# Patient Record
Sex: FEMALE | Race: WHITE | NOT HISPANIC OR LATINO | Employment: UNEMPLOYED | URBAN - METROPOLITAN AREA
[De-identification: names, ages, dates, MRNs, and addresses within clinical notes are randomized per-mention and may not be internally consistent; named-entity substitution may affect disease eponyms.]

---

## 2017-09-12 ENCOUNTER — OFFICE VISIT (OUTPATIENT)
Dept: OBSTETRICS AND GYNECOLOGY | Facility: CLINIC | Age: 62
End: 2017-09-12
Payer: COMMERCIAL

## 2017-09-12 VITALS
WEIGHT: 182 LBS | BODY MASS INDEX: 29.25 KG/M2 | DIASTOLIC BLOOD PRESSURE: 76 MMHG | SYSTOLIC BLOOD PRESSURE: 126 MMHG | HEIGHT: 66 IN

## 2017-09-12 DIAGNOSIS — Z01.411 ENCOUNTER FOR GYNECOLOGICAL EXAMINATION (GENERAL) (ROUTINE) WITH ABNORMAL FINDINGS: ICD-10-CM

## 2017-09-12 DIAGNOSIS — Z12.11 SCREEN FOR COLON CANCER: ICD-10-CM

## 2017-09-12 DIAGNOSIS — Z12.31 ENCOUNTER FOR SCREENING MAMMOGRAM FOR BREAST CANCER: ICD-10-CM

## 2017-09-12 DIAGNOSIS — Z12.11 ENCOUNTER FOR SCREENING FECAL OCCULT BLOOD TESTING: Primary | ICD-10-CM

## 2017-09-12 DIAGNOSIS — R19.5 HEME POSITIVE STOOL: ICD-10-CM

## 2017-09-12 DIAGNOSIS — Z12.31 VISIT FOR SCREENING MAMMOGRAM: ICD-10-CM

## 2017-09-12 DIAGNOSIS — Z78.0 MENOPAUSE: ICD-10-CM

## 2017-09-12 LAB
CTP QC/QA: YES
FECAL OCCULT BLOOD, POC: POSITIVE

## 2017-09-12 PROCEDURE — 82270 OCCULT BLOOD FECES: CPT | Mod: S$GLB,,, | Performed by: OBSTETRICS & GYNECOLOGY

## 2017-09-12 PROCEDURE — 99999 PR PBB SHADOW E&M-EST. PATIENT-LVL III: CPT | Mod: PBBFAC,,, | Performed by: OBSTETRICS & GYNECOLOGY

## 2017-09-12 PROCEDURE — 99396 PREV VISIT EST AGE 40-64: CPT | Mod: S$GLB,,, | Performed by: OBSTETRICS & GYNECOLOGY

## 2017-09-12 NOTE — PROGRESS NOTES
Subjective:       Patient ID: Cara Sharp is a 61 y.o. female.    Chief Complaint:  Well Woman (Annual Exam    --  Last Pap/Hpv 8-31-15, Negative  --  Last MMG 16, Normal   --  Last DEXA  8-31-15, Osteopenia Hip)      History of Present Illness.  Cara Sharp is a 61 y.o. female.  She has no breast or urinary symptoms.  She has no postcoital bleeding, pelvic pain or vaginal discharge.  She has some rectal itching and thinks it may be hemorrhoids.    GYN & OB History  Patient's last menstrual period was 2009 (exact date).   Pap: No result found  Mammogram:  Normal  Colonoscopy:  Colitis  DEXA: 8/31/15 FRAX .6% and 9.5%, Spine nl Hip -1.02    OB History    Para Term  AB Living   2 2 2     2   SAB TAB Ectopic Multiple Live Births           2      # Outcome Date GA Lbr Eduardo/2nd Weight Sex Delivery Anes PTL Lv   2 Term 82 40w0d  3.487 kg (7 lb 11 oz) M Vag-Spont EPI  ABY   1 Term 80 40w0d  3.629 kg (8 lb) F Vag-Spont EPI  ABY      Obstetric Comments   Age at menarche 13       Past Medical History:   Diagnosis Date    Hearing impaired     History of bone density study 2015    Hyperlipidemia     Occult blood positive stool     Pap smear for cervical cancer screening 2015    Negative/ Hpv Neg    Thyroid disease     nodules     Past Surgical History:   Procedure Laterality Date    CARPAL TUNNEL RELEASE Left     COLONOSCOPY  10/2015    Diverticulitis (10/25/07 normal)    DILATION AND CURETTAGE OF UTERUS  1985    LAPAROSCOPY FOR ENDOMETRIOSIS    THYROIDECTOMY, PARTIAL  2011    Nodules, Dr Blackwell ([facility])    TONSILLECTOMY       Family History   Problem Relation Age of Onset    Lung cancer Father 80    Hypertension Father     Hyperlipidemia Father     Cancer Father     Thyroid disease Mother     Hypertension Mother     Hyperlipidemia Mother     Ovarian cancer Maternal Aunt 52    Cancer Maternal Aunt     Breast cancer Neg Hx     Colon  "cancer Neg Hx      Social History   Substance Use Topics    Smoking status: Never Smoker    Smokeless tobacco: Never Used    Alcohol use Yes      Comment: Social       Current Outpatient Prescriptions:     CALCIUM CARBONATE/VITAMIN D3 (OS- + D3 ORAL), once a day, Disp: , Rfl:     drospirenone-estradiol (ANGELIQ) 0.5-1 mg per tablet, TAKE 1 TABLET BY MOUTH ONE TIME DAILY, Disp: 84 tablet, Rfl: 3    escitalopram oxalate (LEXAPRO) 20 MG tablet, , Disp: , Rfl:     levothyroxine (SYNTHROID) 88 MCG tablet, , Disp: , Rfl:     simvastatin (ZOCOR) 40 MG tablet, , Disp: , Rfl:     promethazine-phenylephrine 6.25-5 mg/5 mL Syrp, TK 5 ML PO Q 6 H PRF COUGH, Disp: , Rfl: 0    Review of patient's allergies indicates:   Allergen Reactions    Ibuprofen Hives and Shortness Of Breath    Naproxen Hives, Shortness Of Breath and Rash       Review of Systems  Review of Systems   Constitutional: Negative for fatigue.   HENT: Negative for trouble swallowing.    Eyes: Negative for visual disturbance.   Respiratory: Negative for cough and shortness of breath.    Cardiovascular: Negative for chest pain.   Gastrointestinal: Negative for abdominal distention, abdominal pain, blood in stool, nausea and vomiting.   Genitourinary: Negative for difficulty urinating, dyspareunia, dysuria, flank pain, frequency, hematuria, pelvic pain, urgency, vaginal bleeding, vaginal discharge and vaginal pain.   Musculoskeletal: Negative for arthralgias.   Skin: Negative for rash.   Neurological: Negative for dizziness and headaches.   Psychiatric/Behavioral: Negative for sleep disturbance. The patient is not nervous/anxious.         Objective:     Vitals:    09/12/17 1011   BP: 126/76   Weight: 82.6 kg (181 lb 15.8 oz)   Height: 5' 6" (1.676 m)   PainSc: 0-No pain     Body mass index is 29.37 kg/m².    Physical Exam:   Constitutional: She is oriented to person, place, and time. Vital signs are normal. She appears well-developed and " well-nourished.    HENT:   Head: Normocephalic.     Neck: Normal range of motion. No thyromegaly present.     Pulmonary/Chest: Right breast exhibits no mass, no nipple discharge, no skin change, no tenderness and no swelling. Left breast exhibits no mass, no nipple discharge, no skin change, no tenderness and no swelling. Breasts are symmetrical.        Abdominal: Soft. Normal appearance and bowel sounds are normal. She exhibits no distension. There is no tenderness.     Genitourinary: Vagina normal and uterus normal. Rectal exam shows guaiac positive stool. Guaiac positive stool. Pelvic exam was performed with patient supine. There is no rash, tenderness, lesion or injury on the right labia. There is no rash, tenderness, lesion or injury on the left labia. Cervix is normal. Right adnexum displays no mass, no tenderness and no fullness. Left adnexum displays no mass, no tenderness and no fullness. No erythema in the vagina. No vaginal discharge found. Cervix exhibits no motion tenderness and no discharge.           Musculoskeletal: Normal range of motion.      Lymphadenopathy:        Right: No inguinal and no supraclavicular adenopathy present.        Left: No inguinal and no supraclavicular adenopathy present.    Neurological: She is alert and oriented to person, place, and time.    Skin: Skin is warm and dry.    Psychiatric: She has a normal mood and affect.        Assessment/ Plan:     Encounter for screening fecal occult blood testing  -     POCT occult blood stool    Encounter for screening mammogram for breast cancer  -     Mammo Digital Screening Bilat with Tomosynthesis CAD; Future; Expected date: 09/12/2017    Menopause  -     drospirenone-estradiol (ANGELIQ) 0.5-1 mg per tablet; TAKE 1 TABLET BY MOUTH ONE TIME DAILY  Dispense: 84 tablet; Refill: 3    Visit for screening mammogram    Screen for colon cancer  -     POCT occult blood stool    Heme positive stool    Encounter for gynecological examination  (general) (routine) with abnormal findings      Refer to GI for heme pos stool.  Routine pap smears.  Self breast exam and mammography discussed  Routine colonoscopy discussed.  Diet and exercise discussed.  Recommend calcium 1200 mg and vitamin D 600 units daily and routine bone mineral density testing.  Yearly influenza vaccination discussed.  Follow-up with me in 1 year.

## 2017-10-09 DIAGNOSIS — Z78.0 MENOPAUSE: ICD-10-CM

## 2017-10-09 RX ORDER — DROSPIRENONE AND ESTRADIOL 1; .5 MG/1; MG/1
TABLET, FILM COATED ORAL
Qty: 84 TABLET | Refills: 3 | Status: SHIPPED | OUTPATIENT
Start: 2017-10-09 | End: 2019-09-18

## 2017-11-24 ENCOUNTER — TELEPHONE (OUTPATIENT)
Dept: OBSTETRICS AND GYNECOLOGY | Facility: CLINIC | Age: 62
End: 2017-11-24

## 2018-05-07 ENCOUNTER — TELEPHONE (OUTPATIENT)
Dept: OBSTETRICS AND GYNECOLOGY | Facility: CLINIC | Age: 63
End: 2018-05-07

## 2018-05-07 DIAGNOSIS — Z13.820 SCREENING FOR OSTEOPOROSIS: Primary | ICD-10-CM

## 2018-05-24 ENCOUNTER — TELEPHONE (OUTPATIENT)
Dept: OBSTETRICS AND GYNECOLOGY | Facility: CLINIC | Age: 63
End: 2018-05-24

## 2018-05-24 NOTE — TELEPHONE ENCOUNTER
Pt returned my call and she was notified of Normal Dexa results from [facility] and to continue with Calcium + Vit D, per Dr Thomas. Repeat Dexa in 2 yrs

## 2018-05-24 NOTE — TELEPHONE ENCOUNTER
Dr. Thomas patient calling- would like to get her bone density results from [facility]  From May 9,please call to further discuss

## 2018-09-13 ENCOUNTER — OFFICE VISIT (OUTPATIENT)
Dept: OBSTETRICS AND GYNECOLOGY | Facility: CLINIC | Age: 63
End: 2018-09-13
Payer: COMMERCIAL

## 2018-09-13 VITALS
BODY MASS INDEX: 27.99 KG/M2 | WEIGHT: 174.19 LBS | HEIGHT: 66 IN | DIASTOLIC BLOOD PRESSURE: 78 MMHG | SYSTOLIC BLOOD PRESSURE: 122 MMHG

## 2018-09-13 DIAGNOSIS — Z78.0 MENOPAUSE: ICD-10-CM

## 2018-09-13 DIAGNOSIS — Z12.31 ENCOUNTER FOR SCREENING MAMMOGRAM FOR BREAST CANCER: ICD-10-CM

## 2018-09-13 DIAGNOSIS — Z01.419 ENCOUNTER FOR GYNECOLOGICAL EXAMINATION: Primary | ICD-10-CM

## 2018-09-13 PROCEDURE — 99999 PR PBB SHADOW E&M-EST. PATIENT-LVL III: CPT | Mod: PBBFAC,,, | Performed by: OBSTETRICS & GYNECOLOGY

## 2018-09-13 PROCEDURE — 99396 PREV VISIT EST AGE 40-64: CPT | Mod: S$GLB,,, | Performed by: OBSTETRICS & GYNECOLOGY

## 2018-09-13 RX ORDER — VITAMIN E (DL,TOCOPHERYL ACET) 45 MG/0.25
1 DROPS ORAL DAILY
COMMUNITY
End: 2021-11-12

## 2018-09-13 RX ORDER — PROGESTERONE 100 MG/1
CAPSULE ORAL
Qty: 90 CAPSULE | Refills: 3 | Status: SHIPPED | OUTPATIENT
Start: 2018-09-13 | End: 2019-10-11 | Stop reason: SDUPTHER

## 2018-09-13 RX ORDER — ZOSTER VACCINE RECOMBINANT, ADJUVANTED 50 MCG/0.5
KIT INTRAMUSCULAR
Refills: 0 | COMMUNITY
Start: 2018-08-29 | End: 2019-09-18

## 2018-09-13 RX ORDER — ESTRADIOL 1 MG/1
TABLET ORAL
Qty: 90 TABLET | Refills: 3 | Status: SHIPPED | OUTPATIENT
Start: 2018-09-13 | End: 2019-07-30 | Stop reason: SDUPTHER

## 2018-09-13 NOTE — PROGRESS NOTES
Subjective:       Patient ID: Cara Sharp is a 62 y.o. female.    Chief Complaint:  Well Woman (Annual Exam  --  Last Pap/Hpv 8-31-15, Negative  --  Last MMG 17, Birads 2/B  --  Colonosocpy , Colitis  --  Dexa 18, Normal )      History of Present Illness.  Cara Sharp is a 62 y.o. female.  She has no breast or urinary symptoms.  She has no postcoital bleeding, pelvic pain or vaginal discharge.    GYN & OB History  Patient's last menstrual period was 2009 (exact date).   Pap:  Normal HPV negative  Mammogram: 17 Normal  Colonoscopy:  Colitis  DEXA: 18 Normal    OB History    Para Term  AB Living   2 2 2     2   SAB TAB Ectopic Multiple Live Births           2      # Outcome Date GA Lbr Eduardo/2nd Weight Sex Delivery Anes PTL Lv   2 Term 82 40w0d  3.487 kg (7 lb 11 oz) M Vag-Spont EPI  ABY   1 Term 81 40w0d  3.629 kg (8 lb) F Vag-Spont EPI  ABY      Obstetric Comments   Age at menarche 13       Past Medical History:   Diagnosis Date    H/O mammogram 2017    Birads 2/B  @ [facility]     Hearing impaired     History of bone density study 2018    Spine T-score: 0.9   //   Rt Hip T-score:  0.0   @ [facility]     Hyperlipidemia     Occult blood positive stool     Pap smear for cervical cancer screening 2015    Negative/ Hpv Neg    Post-menopause     Thyroid disease     nodules     Past Surgical History:   Procedure Laterality Date    CARPAL TUNNEL RELEASE Left     COLONOSCOPY  10/2015    Diverticulitis (10/25/07 normal)    DILATION AND CURETTAGE OF UTERUS  1985    LAPAROSCOPY FOR ENDOMETRIOSIS    THYROIDECTOMY, PARTIAL  2011    Nodules, Dr Blackwell ([facility])    TONSILLECTOMY       Family History   Problem Relation Age of Onset    Lung cancer Father 80    Hypertension Father     Hyperlipidemia Father     Cancer Father     Thyroid disease Mother     Hypertension Mother     Hyperlipidemia Mother     Ovarian cancer Maternal Aunt  52    Cancer Maternal Aunt     Breast cancer Neg Hx     Colon cancer Neg Hx      Social History     Tobacco Use    Smoking status: Never Smoker    Smokeless tobacco: Never Used   Substance Use Topics    Alcohol use: Yes     Frequency: 2-4 times a month     Drinks per session: 1 or 2     Binge frequency: Never     Comment: Social    Drug use: No       Current Outpatient Medications:     ANGELIQ 0.5-1 mg per tablet, TAKE 1 TABLET BY MOUTH ONE TIME DAILY, Disp: 84 tablet, Rfl: 3    calcium carb and citrate-vitD3 (CITRACAL + D SLOW RELEASE) 600 mg calcium- 500 unit TbSR, Take 1 tablet by mouth once daily., Disp: , Rfl:     escitalopram oxalate (LEXAPRO) 20 MG tablet, , Disp: , Rfl:     levothyroxine (SYNTHROID) 88 MCG tablet, , Disp: , Rfl:     simvastatin (ZOCOR) 40 MG tablet, , Disp: , Rfl:     estradiol (ESTRACE) 1 MG tablet, Take 1 tablet every morning by mouth, Disp: 90 tablet, Rfl: 3    progesterone (PROMETRIUM) 100 MG capsule, Take 1 tablet by mouth 30-60 minutes before bed, Disp: 90 capsule, Rfl: 3    SHINGRIX, PF, 50 mcg/0.5 mL injection, ADM 0.5ML IM UTD, Disp: , Rfl: 0    Review of patient's allergies indicates:   Allergen Reactions    Ibuprofen Hives and Shortness Of Breath    Naproxen Hives, Shortness Of Breath and Rash       Review of Systems  Review of Systems   Constitutional: Negative for fatigue.   HENT: Negative for trouble swallowing.    Eyes: Negative for visual disturbance.   Respiratory: Negative for cough and shortness of breath.    Cardiovascular: Negative for chest pain.   Gastrointestinal: Negative for abdominal distention, abdominal pain, blood in stool, nausea and vomiting.   Genitourinary: Negative for difficulty urinating, dyspareunia, dysuria, flank pain, frequency, hematuria, pelvic pain, urgency, vaginal bleeding, vaginal discharge and vaginal pain.   Musculoskeletal: Negative for arthralgias.   Skin: Negative for rash.   Neurological: Negative for dizziness and  "headaches.   Psychiatric/Behavioral: Negative for sleep disturbance. The patient is not nervous/anxious.         Objective:     Vitals:    09/13/18 1013   BP: 122/78   Weight: 79 kg (174 lb 2.6 oz)   Height: 5' 6" (1.676 m)   PainSc: 0-No pain     Body mass index is 28.11 kg/m².    Physical Exam:   Constitutional: She is oriented to person, place, and time. Vital signs are normal. She appears well-developed and well-nourished.    HENT:   Head: Normocephalic.     Neck: Normal range of motion. No thyromegaly present.     Pulmonary/Chest: Right breast exhibits no mass, no nipple discharge, no skin change, no tenderness and no swelling. Left breast exhibits no mass, no nipple discharge, no skin change, no tenderness and no swelling. Breasts are symmetrical.        Abdominal: Soft. Normal appearance and bowel sounds are normal. She exhibits no distension. There is no tenderness.     Genitourinary: Vagina normal and uterus normal. Pelvic exam was performed with patient supine. There is no rash, tenderness, lesion or injury on the right labia. There is no rash, tenderness, lesion or injury on the left labia. Cervix is normal. Right adnexum displays no mass, no tenderness and no fullness. Left adnexum displays no mass, no tenderness and no fullness. No erythema in the vagina. No vaginal discharge found. Cervix exhibits no motion tenderness and no discharge.           Musculoskeletal: Normal range of motion.      Lymphadenopathy:        Right: No inguinal and no supraclavicular adenopathy present.        Left: No inguinal and no supraclavicular adenopathy present.    Neurological: She is alert and oriented to person, place, and time.    Skin: Skin is warm and dry.    Psychiatric: She has a normal mood and affect.        Assessment/ Plan:     Encounter for gynecological examination    Encounter for screening mammogram for breast cancer  -     Cancel: Mammo Digital Screening Bilat with Tomosynthesis CAD; Future; Expected date: " 09/13/2018    Menopause  -     estradiol (ESTRACE) 1 MG tablet; Take 1 tablet every morning by mouth  Dispense: 90 tablet; Refill: 3  -     progesterone (PROMETRIUM) 100 MG capsule; Take 1 tablet by mouth 30-60 minutes before bed  Dispense: 90 capsule; Refill: 3    Other orders  -     Cancel: drospirenone-estradiol (ANGELIQ) 0.5-1 mg per tablet; TAKE 1 TABLET BY MOUTH ONE TIME DAILY  Dispense: 84 tablet; Refill: 3      She will call me after 3 months to see about increasing HRT doses  Routine pap smears.  Self breast exam and mammography discussed  Routine colonoscopy discussed.  Diet and exercise discussed.  Recommend calcium 1200 mg and vitamin D 1000 units daily and routine bone mineral density testing.  Yearly influenza vaccination discussed.  Follow-up with me in 1 year

## 2018-12-06 ENCOUNTER — TELEPHONE (OUTPATIENT)
Dept: OBSTETRICS AND GYNECOLOGY | Facility: CLINIC | Age: 63
End: 2018-12-06

## 2018-12-06 NOTE — TELEPHONE ENCOUNTER
Informed pt breast ultrasound showed benign cyst in left breast and recommended a 1 year screening mammogram. Pt verbalized understanding.

## 2019-07-30 DIAGNOSIS — Z78.0 MENOPAUSE: ICD-10-CM

## 2019-07-30 RX ORDER — ESTRADIOL 1 MG/1
1 TABLET ORAL DAILY
Qty: 90 TABLET | Refills: 0 | Status: SHIPPED | OUTPATIENT
Start: 2019-07-30 | End: 2019-10-11 | Stop reason: SDUPTHER

## 2019-09-18 ENCOUNTER — OFFICE VISIT (OUTPATIENT)
Dept: OBSTETRICS AND GYNECOLOGY | Facility: CLINIC | Age: 64
End: 2019-09-18
Payer: COMMERCIAL

## 2019-09-18 VITALS
SYSTOLIC BLOOD PRESSURE: 126 MMHG | WEIGHT: 155.19 LBS | DIASTOLIC BLOOD PRESSURE: 74 MMHG | HEIGHT: 66 IN | BODY MASS INDEX: 24.94 KG/M2

## 2019-09-18 DIAGNOSIS — Z12.31 VISIT FOR SCREENING MAMMOGRAM: ICD-10-CM

## 2019-09-18 DIAGNOSIS — Z12.4 SCREENING FOR CERVICAL CANCER: ICD-10-CM

## 2019-09-18 DIAGNOSIS — Z01.419 ENCOUNTER FOR GYNECOLOGICAL EXAMINATION: Primary | ICD-10-CM

## 2019-09-18 DIAGNOSIS — Z11.51 SPECIAL SCREENING EXAMINATION FOR HUMAN PAPILLOMAVIRUS (HPV): ICD-10-CM

## 2019-09-18 PROCEDURE — 88175 CYTOPATH C/V AUTO FLUID REDO: CPT

## 2019-09-18 PROCEDURE — 99396 PR PREVENTIVE VISIT,EST,40-64: ICD-10-PCS | Mod: S$GLB,,, | Performed by: OBSTETRICS & GYNECOLOGY

## 2019-09-18 PROCEDURE — 99999 PR PBB SHADOW E&M-EST. PATIENT-LVL III: ICD-10-PCS | Mod: PBBFAC,,, | Performed by: OBSTETRICS & GYNECOLOGY

## 2019-09-18 PROCEDURE — 87624 HPV HI-RISK TYP POOLED RSLT: CPT

## 2019-09-18 PROCEDURE — 99396 PREV VISIT EST AGE 40-64: CPT | Mod: S$GLB,,, | Performed by: OBSTETRICS & GYNECOLOGY

## 2019-09-18 PROCEDURE — 99999 PR PBB SHADOW E&M-EST. PATIENT-LVL III: CPT | Mod: PBBFAC,,, | Performed by: OBSTETRICS & GYNECOLOGY

## 2019-09-18 NOTE — PROGRESS NOTES
Subjective:       Patient ID: Cara Sharp is a 63 y.o. female.    Chief Complaint:  Well Woman      History of Present Illness.  Cara Sharp is a 63 y.o. female.  She has no breast or urinary symptoms.  She has no postcoital bleeding, pelvic pain or vaginal discharge.    GYN & OB History  Patient's last menstrual period was 2009 (exact date).   Pap: No result found  Mammogram: 18 left breast nodule 12 o'clock, U/S: benign simple cyst  Colonoscopy:  Colitis  DEXA: 18 Normal     OB History    Para Term  AB Living   2 2 2     2   SAB TAB Ectopic Multiple Live Births           2      # Outcome Date GA Lbr Eduardo/2nd Weight Sex Delivery Anes PTL Lv   2 Term 82 40w0d  3.487 kg (7 lb 11 oz) M Vag-Spont EPI  ABY   1 Term 80 40w0d  3.629 kg (8 lb) F Vag-Spont EPI  ABY      Obstetric Comments   Age at menarche 13       Past Medical History:   Diagnosis Date    H/O mammogram 2017    Birads 2/B  @ [facility]     Hearing impaired     History of bone density study 2018    Spine T-score: 0.9   //   Rt Hip T-score:  0.0   @ [facility]     Hyperlipidemia     Occult blood positive stool     Pap smear for cervical cancer screening 2015    Negative/ Hpv Neg    Post-menopause     Thyroid disease     nodules     Past Surgical History:   Procedure Laterality Date    CARPAL TUNNEL RELEASE Left     COLONOSCOPY  10/2015    Diverticulitis (10/25/07 normal)    DILATION AND CURETTAGE OF UTERUS  1985    LAPAROSCOPY FOR ENDOMETRIOSIS    THYROIDECTOMY, PARTIAL  2011    Nodules, Dr Blackwell ([facility])    TONSILLECTOMY       Family History   Problem Relation Age of Onset    Lung cancer Father 80    Hypertension Father     Hyperlipidemia Father     Cancer Father     Thyroid disease Mother     Hypertension Mother     Hyperlipidemia Mother     Ovarian cancer Maternal Aunt 52    Cancer Maternal Aunt     Breast cancer Neg Hx     Colon cancer Neg Hx      Social History      Tobacco Use    Smoking status: Never Smoker    Smokeless tobacco: Never Used   Substance Use Topics    Alcohol use: Yes     Frequency: 2-4 times a month     Drinks per session: 1 or 2     Binge frequency: Never     Comment: Social    Drug use: No       Current Outpatient Medications:     calcium carb and citrate-vitD3 (CITRACAL + D SLOW RELEASE) 600 mg calcium- 500 unit TbSR, Take 1 tablet by mouth once daily., Disp: , Rfl:     ergocalciferol, vitamin D2, (VITAMIN D ORAL), Take by mouth., Disp: , Rfl:     escitalopram oxalate (LEXAPRO) 20 MG tablet, , Disp: , Rfl:     estradiol (ESTRACE) 1 MG tablet, Take 1 tablet (1 mg total) by mouth once daily. You are due for an annual exam.  Please call to schedule an appointment.  Thanks!, Disp: 90 tablet, Rfl: 0    levothyroxine (SYNTHROID) 88 MCG tablet, , Disp: , Rfl:     progesterone (PROMETRIUM) 100 MG capsule, Take 1 tablet by mouth 30-60 minutes before bed, Disp: 90 capsule, Rfl: 3    simvastatin (ZOCOR) 40 MG tablet, , Disp: , Rfl:     Review of patient's allergies indicates:   Allergen Reactions    Ibuprofen Hives and Shortness Of Breath    Naproxen Hives, Shortness Of Breath and Rash       Review of Systems  Review of Systems   Constitutional: Negative for fatigue.   HENT: Negative for trouble swallowing.    Eyes: Negative for visual disturbance.   Respiratory: Negative for cough and shortness of breath.    Cardiovascular: Negative for chest pain.   Gastrointestinal: Negative for abdominal distention, abdominal pain, blood in stool, nausea and vomiting.   Genitourinary: Negative for difficulty urinating, dyspareunia, dysuria, flank pain, frequency, hematuria, pelvic pain, urgency, vaginal bleeding, vaginal discharge and vaginal pain.   Musculoskeletal: Negative for arthralgias.   Skin: Negative for rash.   Neurological: Negative for dizziness and headaches.   Psychiatric/Behavioral: Negative for sleep disturbance. The patient is not nervous/anxious.   "       Objective:     Vitals:    09/18/19 0957   BP: 126/74   Weight: 70.4 kg (155 lb 3.3 oz)   Height: 5' 6" (1.676 m)   PainSc: 0-No pain     Body mass index is 25.05 kg/m².    Physical Exam:   Constitutional: She is oriented to person, place, and time. Vital signs are normal. She appears well-developed and well-nourished.    HENT:   Head: Normocephalic.     Neck: Normal range of motion. No thyromegaly present.     Pulmonary/Chest: Right breast exhibits no mass, no nipple discharge, no skin change, no tenderness and no swelling. Left breast exhibits no mass, no nipple discharge, no skin change, no tenderness and no swelling. Breasts are symmetrical.        Abdominal: Soft. Normal appearance and bowel sounds are normal. She exhibits no distension. There is no tenderness.     Genitourinary: Vagina normal and uterus normal. Pelvic exam was performed with patient supine. There is no rash, tenderness, lesion or injury on the right labia. There is no rash, tenderness, lesion or injury on the left labia. Cervix is normal. Right adnexum displays no mass, no tenderness and no fullness. Left adnexum displays no mass, no tenderness and no fullness. No erythema in the vagina. No vaginal discharge found. Cervix exhibits no motion tenderness and no discharge.           Musculoskeletal: Normal range of motion.      Lymphadenopathy:        Right: No inguinal and no supraclavicular adenopathy present.        Left: No inguinal and no supraclavicular adenopathy present.    Neurological: She is alert and oriented to person, place, and time.    Skin: Skin is warm and dry.    Psychiatric: She has a normal mood and affect.        Assessment/ Plan:     Encounter for gynecological examination    Screening for cervical cancer  -     Liquid-based pap smear, screening    Special screening examination for human papillomavirus (HPV)  -     HPV High Risk Genotypes, PCR    Visit for screening mammogram  -     Mammo Digital Screening Bilat w/ " Arnulfo; Future; Expected date: 09/18/2019        Routine pap smears.  Self breast exam and mammography discussed  Routine colonoscopy discussed.  Diet and exercise discussed.  Recommend calcium 1200 mg and vitamin D 1000 units daily and routine bone mineral density testing.  Yearly influenza vaccination discussed.  Follow-up with me in 1 year

## 2019-09-25 ENCOUNTER — TELEPHONE (OUTPATIENT)
Dept: OBSTETRICS AND GYNECOLOGY | Facility: CLINIC | Age: 64
End: 2019-09-25

## 2019-09-25 LAB
HPV HR 12 DNA CVX QL NAA+PROBE: NEGATIVE
HPV16 AG SPEC QL: NEGATIVE
HPV18 DNA SPEC QL NAA+PROBE: NEGATIVE

## 2019-09-25 NOTE — TELEPHONE ENCOUNTER
----- Message from Sheila Thomas MD sent at 9/25/2019 12:03 PM CDT -----  Call patient and tell her that her Pap smear is normal and I will see her in 1 year for her annual exam.

## 2019-10-10 DIAGNOSIS — Z78.0 MENOPAUSE: ICD-10-CM

## 2019-10-11 RX ORDER — ESTRADIOL 1 MG/1
TABLET ORAL
Qty: 90 TABLET | Refills: 3 | Status: SHIPPED | OUTPATIENT
Start: 2019-10-11 | End: 2020-09-24 | Stop reason: SDUPTHER

## 2019-10-11 RX ORDER — PROGESTERONE 100 MG/1
CAPSULE ORAL
Qty: 90 CAPSULE | Refills: 3 | Status: SHIPPED | OUTPATIENT
Start: 2019-10-11 | End: 2020-09-24 | Stop reason: SDUPTHER

## 2019-12-05 ENCOUNTER — TELEPHONE (OUTPATIENT)
Dept: OBSTETRICS AND GYNECOLOGY | Facility: CLINIC | Age: 64
End: 2019-12-05

## 2020-09-24 ENCOUNTER — OFFICE VISIT (OUTPATIENT)
Dept: OBSTETRICS AND GYNECOLOGY | Facility: CLINIC | Age: 65
End: 2020-09-24
Attending: OBSTETRICS & GYNECOLOGY
Payer: COMMERCIAL

## 2020-09-24 VITALS
DIASTOLIC BLOOD PRESSURE: 80 MMHG | HEIGHT: 66 IN | WEIGHT: 174.19 LBS | BODY MASS INDEX: 27.99 KG/M2 | SYSTOLIC BLOOD PRESSURE: 126 MMHG

## 2020-09-24 DIAGNOSIS — Z78.0 MENOPAUSE: ICD-10-CM

## 2020-09-24 DIAGNOSIS — Z12.31 BREAST CANCER SCREENING BY MAMMOGRAM: Primary | ICD-10-CM

## 2020-09-24 DIAGNOSIS — Z01.419 ENCOUNTER FOR GYNECOLOGICAL EXAMINATION: ICD-10-CM

## 2020-09-24 PROCEDURE — 99999 PR PBB SHADOW E&M-EST. PATIENT-LVL III: ICD-10-PCS | Mod: PBBFAC,,, | Performed by: OBSTETRICS & GYNECOLOGY

## 2020-09-24 PROCEDURE — 99396 PR PREVENTIVE VISIT,EST,40-64: ICD-10-PCS | Mod: S$GLB,,, | Performed by: OBSTETRICS & GYNECOLOGY

## 2020-09-24 PROCEDURE — 99396 PREV VISIT EST AGE 40-64: CPT | Mod: S$GLB,,, | Performed by: OBSTETRICS & GYNECOLOGY

## 2020-09-24 PROCEDURE — 99999 PR PBB SHADOW E&M-EST. PATIENT-LVL III: CPT | Mod: PBBFAC,,, | Performed by: OBSTETRICS & GYNECOLOGY

## 2020-09-24 PROCEDURE — 3008F PR BODY MASS INDEX (BMI) DOCUMENTED: ICD-10-PCS | Mod: CPTII,S$GLB,, | Performed by: OBSTETRICS & GYNECOLOGY

## 2020-09-24 PROCEDURE — 3008F BODY MASS INDEX DOCD: CPT | Mod: CPTII,S$GLB,, | Performed by: OBSTETRICS & GYNECOLOGY

## 2020-09-24 RX ORDER — PROGESTERONE 100 MG/1
CAPSULE ORAL
Qty: 90 CAPSULE | Refills: 3 | Status: SHIPPED | OUTPATIENT
Start: 2020-09-24 | End: 2020-09-24

## 2020-09-24 RX ORDER — ESTRADIOL 1 MG/1
TABLET ORAL
Qty: 90 TABLET | Refills: 3 | Status: SHIPPED | OUTPATIENT
Start: 2020-09-24 | End: 2020-09-24

## 2020-09-24 RX ORDER — ESTRADIOL 2 MG/1
2 TABLET ORAL DAILY
Qty: 30 TABLET | Refills: 11 | Status: SHIPPED | OUTPATIENT
Start: 2020-09-24 | End: 2021-08-17

## 2020-09-24 RX ORDER — PROGESTERONE 200 MG/1
200 CAPSULE ORAL DAILY
Qty: 30 CAPSULE | Refills: 11 | Status: SHIPPED | OUTPATIENT
Start: 2020-09-24 | End: 2021-11-12

## 2020-09-24 RX ORDER — SODIUM, POTASSIUM,MAG SULFATES 17.5-3.13G
SOLUTION, RECONSTITUTED, ORAL ORAL
COMMUNITY
Start: 2020-09-22 | End: 2021-08-17

## 2020-09-24 RX ORDER — ERGOCALCIFEROL 1.25 MG/1
CAPSULE ORAL
COMMUNITY
Start: 2020-06-30 | End: 2021-11-12

## 2020-10-13 ENCOUNTER — TELEPHONE (OUTPATIENT)
Dept: OBSTETRICS AND GYNECOLOGY | Facility: CLINIC | Age: 65
End: 2020-10-13

## 2020-10-20 NOTE — TELEPHONE ENCOUNTER
"I called [facility] and spoke with "Shelby". Informed her that pt is on the Estradiol 2 mg and Progesterone 200 mg and also informed her that we didn't send Levothyroxine, because we do Not give her that medicine.     We do not have any Thyroid Labs on file   "

## 2021-04-26 ENCOUNTER — PATIENT MESSAGE (OUTPATIENT)
Dept: RESEARCH | Facility: HOSPITAL | Age: 66
End: 2021-04-26

## 2021-04-27 ENCOUNTER — TELEPHONE (OUTPATIENT)
Dept: OBSTETRICS AND GYNECOLOGY | Facility: CLINIC | Age: 66
End: 2021-04-27

## 2021-08-03 ENCOUNTER — TELEPHONE (OUTPATIENT)
Dept: OBSTETRICS AND GYNECOLOGY | Facility: CLINIC | Age: 66
End: 2021-08-03

## 2021-08-03 DIAGNOSIS — N95.0 PMB (POSTMENOPAUSAL BLEEDING): Primary | ICD-10-CM

## 2021-08-17 ENCOUNTER — HOSPITAL ENCOUNTER (OUTPATIENT)
Dept: RADIOLOGY | Facility: OTHER | Age: 66
Discharge: HOME | End: 2021-08-17
Attending: OBSTETRICS & GYNECOLOGY
Payer: COMMERCIAL

## 2021-08-17 ENCOUNTER — OFFICE VISIT (OUTPATIENT)
Dept: OBSTETRICS AND GYNECOLOGY | Facility: CLINIC | Age: 66
End: 2021-08-17
Attending: OBSTETRICS & GYNECOLOGY
Payer: COMMERCIAL

## 2021-08-17 VITALS
SYSTOLIC BLOOD PRESSURE: 122 MMHG | DIASTOLIC BLOOD PRESSURE: 80 MMHG | WEIGHT: 178.56 LBS | BODY MASS INDEX: 28.7 KG/M2 | HEIGHT: 66 IN

## 2021-08-17 DIAGNOSIS — N95.0 PMB (POSTMENOPAUSAL BLEEDING): ICD-10-CM

## 2021-08-17 DIAGNOSIS — N95.0 PMB (POSTMENOPAUSAL BLEEDING): Primary | ICD-10-CM

## 2021-08-17 DIAGNOSIS — Z78.0 MENOPAUSE: ICD-10-CM

## 2021-08-17 DIAGNOSIS — N63.25 BREAST LUMP ON LEFT SIDE AT 3 O'CLOCK POSITION: ICD-10-CM

## 2021-08-17 DIAGNOSIS — R93.89 THICKENED ENDOMETRIUM: ICD-10-CM

## 2021-08-17 PROCEDURE — 76856 US PELVIS COMP WITH TRANSVAG NON-OB (XPD): ICD-10-PCS | Mod: 26,,, | Performed by: RADIOLOGY

## 2021-08-17 PROCEDURE — 99999 PR PBB SHADOW E&M-EST. PATIENT-LVL III: ICD-10-PCS | Mod: PBBFAC,,, | Performed by: OBSTETRICS & GYNECOLOGY

## 2021-08-17 PROCEDURE — 1159F MED LIST DOCD IN RCRD: CPT | Mod: CPTII,S$GLB,, | Performed by: OBSTETRICS & GYNECOLOGY

## 2021-08-17 PROCEDURE — 3008F PR BODY MASS INDEX (BMI) DOCUMENTED: ICD-10-PCS | Mod: CPTII,S$GLB,, | Performed by: OBSTETRICS & GYNECOLOGY

## 2021-08-17 PROCEDURE — 76830 US PELVIS COMP WITH TRANSVAG NON-OB (XPD): ICD-10-PCS | Mod: 26,,, | Performed by: RADIOLOGY

## 2021-08-17 PROCEDURE — 1126F AMNT PAIN NOTED NONE PRSNT: CPT | Mod: CPTII,S$GLB,, | Performed by: OBSTETRICS & GYNECOLOGY

## 2021-08-17 PROCEDURE — 3079F DIAST BP 80-89 MM HG: CPT | Mod: CPTII,S$GLB,, | Performed by: OBSTETRICS & GYNECOLOGY

## 2021-08-17 PROCEDURE — 3074F PR MOST RECENT SYSTOLIC BLOOD PRESSURE < 130 MM HG: ICD-10-PCS | Mod: CPTII,S$GLB,, | Performed by: OBSTETRICS & GYNECOLOGY

## 2021-08-17 PROCEDURE — 88305 TISSUE EXAM BY PATHOLOGIST: CPT | Performed by: PATHOLOGY

## 2021-08-17 PROCEDURE — 88305 TISSUE EXAM BY PATHOLOGIST: CPT | Mod: 26,,, | Performed by: PATHOLOGY

## 2021-08-17 PROCEDURE — 99999 PR PBB SHADOW E&M-EST. PATIENT-LVL III: CPT | Mod: PBBFAC,,, | Performed by: OBSTETRICS & GYNECOLOGY

## 2021-08-17 PROCEDURE — 1159F PR MEDICATION LIST DOCUMENTED IN MEDICAL RECORD: ICD-10-PCS | Mod: CPTII,S$GLB,, | Performed by: OBSTETRICS & GYNECOLOGY

## 2021-08-17 PROCEDURE — 3079F PR MOST RECENT DIASTOLIC BLOOD PRESSURE 80-89 MM HG: ICD-10-PCS | Mod: CPTII,S$GLB,, | Performed by: OBSTETRICS & GYNECOLOGY

## 2021-08-17 PROCEDURE — 88305 TISSUE EXAM BY PATHOLOGIST: ICD-10-PCS | Mod: 26,,, | Performed by: PATHOLOGY

## 2021-08-17 PROCEDURE — 99214 OFFICE O/P EST MOD 30 MIN: CPT | Mod: S$GLB,,, | Performed by: OBSTETRICS & GYNECOLOGY

## 2021-08-17 PROCEDURE — 3008F BODY MASS INDEX DOCD: CPT | Mod: CPTII,S$GLB,, | Performed by: OBSTETRICS & GYNECOLOGY

## 2021-08-17 PROCEDURE — 3074F SYST BP LT 130 MM HG: CPT | Mod: CPTII,S$GLB,, | Performed by: OBSTETRICS & GYNECOLOGY

## 2021-08-17 PROCEDURE — 76830 TRANSVAGINAL US NON-OB: CPT | Mod: 26,,, | Performed by: RADIOLOGY

## 2021-08-17 PROCEDURE — 76856 US EXAM PELVIC COMPLETE: CPT | Mod: 26,,, | Performed by: RADIOLOGY

## 2021-08-17 PROCEDURE — 99214 PR OFFICE/OUTPT VISIT, EST, LEVL IV, 30-39 MIN: ICD-10-PCS | Mod: S$GLB,,, | Performed by: OBSTETRICS & GYNECOLOGY

## 2021-08-17 PROCEDURE — 1126F PR PAIN SEVERITY QUANTIFIED, NO PAIN PRESENT: ICD-10-PCS | Mod: CPTII,S$GLB,, | Performed by: OBSTETRICS & GYNECOLOGY

## 2021-08-17 PROCEDURE — 76856 US EXAM PELVIC COMPLETE: CPT | Mod: TC

## 2021-08-17 RX ORDER — ESTRADIOL 1 MG/1
1.5 TABLET ORAL DAILY
Qty: 45 TABLET | Refills: 11 | Status: SHIPPED | OUTPATIENT
Start: 2021-08-17 | End: 2021-11-12

## 2021-08-20 LAB
FINAL PATHOLOGIC DIAGNOSIS: NORMAL
GROSS: NORMAL
Lab: NORMAL

## 2021-09-28 ENCOUNTER — TELEPHONE (OUTPATIENT)
Dept: OBSTETRICS AND GYNECOLOGY | Facility: CLINIC | Age: 66
End: 2021-09-28

## 2021-10-15 ENCOUNTER — TELEPHONE (OUTPATIENT)
Dept: OBSTETRICS AND GYNECOLOGY | Facility: CLINIC | Age: 66
End: 2021-10-15

## 2021-10-15 DIAGNOSIS — N63.20 LEFT BREAST LUMP: Primary | ICD-10-CM

## 2021-10-18 ENCOUNTER — TELEPHONE (OUTPATIENT)
Dept: OBSTETRICS AND GYNECOLOGY | Facility: CLINIC | Age: 66
End: 2021-10-18

## 2021-10-19 ENCOUNTER — TELEPHONE (OUTPATIENT)
Dept: OBSTETRICS AND GYNECOLOGY | Facility: CLINIC | Age: 66
End: 2021-10-19

## 2021-10-20 ENCOUNTER — TELEPHONE (OUTPATIENT)
Dept: OBSTETRICS AND GYNECOLOGY | Facility: CLINIC | Age: 66
End: 2021-10-20

## 2021-10-20 DIAGNOSIS — N63.20 LEFT BREAST LUMP: Primary | ICD-10-CM

## 2021-10-27 ENCOUNTER — TELEPHONE (OUTPATIENT)
Dept: OBSTETRICS AND GYNECOLOGY | Facility: CLINIC | Age: 66
End: 2021-10-27
Payer: MEDICARE

## 2021-11-02 ENCOUNTER — TELEPHONE (OUTPATIENT)
Dept: OBSTETRICS AND GYNECOLOGY | Facility: CLINIC | Age: 66
End: 2021-11-02
Payer: MEDICARE

## 2021-11-02 DIAGNOSIS — D49.3 BREAST NEOPLASM: Primary | ICD-10-CM

## 2021-11-04 ENCOUNTER — TELEPHONE (OUTPATIENT)
Dept: SURGERY | Facility: CLINIC | Age: 66
End: 2021-11-04
Payer: MEDICARE

## 2021-11-10 ENCOUNTER — LAB VISIT (OUTPATIENT)
Dept: LAB | Facility: HOSPITAL | Age: 66
End: 2021-11-10
Attending: SURGERY
Payer: MEDICARE

## 2021-11-10 DIAGNOSIS — C50.919 BREAST CANCER IN FEMALE: ICD-10-CM

## 2021-11-10 DIAGNOSIS — C50.919 BREAST CANCER IN FEMALE: Primary | ICD-10-CM

## 2021-11-10 LAB
COMMENT: NORMAL
FINAL PATHOLOGIC DIAGNOSIS: NORMAL
Lab: NORMAL

## 2021-11-10 PROCEDURE — 88321 CONSLTJ&REPRT SLD PREP ELSWR: CPT | Performed by: PATHOLOGY

## 2021-11-10 PROCEDURE — 88321 PR  MICROSLIDE CONSULT: ICD-10-PCS | Mod: ,,, | Performed by: PATHOLOGY

## 2021-11-10 PROCEDURE — 88321 CONSLTJ&REPRT SLD PREP ELSWR: CPT | Mod: ,,, | Performed by: PATHOLOGY

## 2021-11-11 ENCOUNTER — HOSPITAL ENCOUNTER (OUTPATIENT)
Dept: RADIOLOGY | Facility: HOSPITAL | Age: 66
Discharge: HOME | End: 2021-11-11
Attending: SURGERY
Payer: MEDICARE

## 2021-11-11 DIAGNOSIS — C50.912 INVASIVE DUCTAL CARCINOMA OF BREAST, LEFT: Primary | ICD-10-CM

## 2021-11-11 PROCEDURE — 76642 ULTRASOUND BREAST LIMITED: CPT | Mod: 26,LT,, | Performed by: RADIOLOGY

## 2021-11-11 PROCEDURE — 77066 PR MAMMO, CAD, DIAGNOSTIC, BILAT: ICD-10-PCS | Mod: 26,,, | Performed by: RADIOLOGY

## 2021-11-11 PROCEDURE — 76642 PR US BREAST UNILAT LIMITED: ICD-10-PCS | Mod: 26,LT,, | Performed by: RADIOLOGY

## 2021-11-11 PROCEDURE — 77066 DX MAMMO INCL CAD BI: CPT | Mod: 26,,, | Performed by: RADIOLOGY

## 2021-11-12 ENCOUNTER — OFFICE VISIT (OUTPATIENT)
Dept: SURGERY | Facility: CLINIC | Age: 66
End: 2021-11-12
Payer: MEDICARE

## 2021-11-12 ENCOUNTER — HOSPITAL ENCOUNTER (OUTPATIENT)
Dept: CARDIOLOGY | Facility: CLINIC | Age: 66
Discharge: HOME | End: 2021-11-12
Payer: MEDICARE

## 2021-11-12 ENCOUNTER — HOSPITAL ENCOUNTER (OUTPATIENT)
Dept: RADIOLOGY | Facility: HOSPITAL | Age: 66
Discharge: HOME | End: 2021-11-12
Attending: SURGERY
Payer: MEDICARE

## 2021-11-12 ENCOUNTER — DOCUMENTATION ONLY (OUTPATIENT)
Dept: SURGERY | Facility: CLINIC | Age: 66
End: 2021-11-12

## 2021-11-12 ENCOUNTER — DOCUMENTATION ONLY (OUTPATIENT)
Dept: SURGERY | Facility: CLINIC | Age: 66
End: 2021-11-12
Payer: MEDICARE

## 2021-11-12 VITALS
HEIGHT: 66 IN | SYSTOLIC BLOOD PRESSURE: 162 MMHG | BODY MASS INDEX: 28.93 KG/M2 | DIASTOLIC BLOOD PRESSURE: 77 MMHG | WEIGHT: 180 LBS | HEART RATE: 71 BPM

## 2021-11-12 DIAGNOSIS — N63.10 BREAST MASS, RIGHT: ICD-10-CM

## 2021-11-12 DIAGNOSIS — C50.412 MALIGNANT NEOPLASM OF UPPER-OUTER QUADRANT OF LEFT BREAST IN FEMALE, ESTROGEN RECEPTOR POSITIVE: ICD-10-CM

## 2021-11-12 DIAGNOSIS — Z01.818 PRE-OP TESTING: ICD-10-CM

## 2021-11-12 DIAGNOSIS — Z17.0 MALIGNANT NEOPLASM OF UPPER-OUTER QUADRANT OF LEFT BREAST IN FEMALE, ESTROGEN RECEPTOR POSITIVE: ICD-10-CM

## 2021-11-12 DIAGNOSIS — D49.3 BREAST NEOPLASM: ICD-10-CM

## 2021-11-12 DIAGNOSIS — Z01.818 PRE-OP TESTING: Primary | ICD-10-CM

## 2021-11-12 PROCEDURE — 93005 EKG 12-LEAD: ICD-10-PCS | Mod: S$GLB,,, | Performed by: SURGERY

## 2021-11-12 PROCEDURE — 3078F PR MOST RECENT DIASTOLIC BLOOD PRESSURE < 80 MM HG: ICD-10-PCS | Mod: CPTII,S$GLB,, | Performed by: SURGERY

## 2021-11-12 PROCEDURE — 93005 ELECTROCARDIOGRAM TRACING: CPT | Mod: S$GLB,,, | Performed by: SURGERY

## 2021-11-12 PROCEDURE — 3077F SYST BP >= 140 MM HG: CPT | Mod: CPTII,S$GLB,, | Performed by: SURGERY

## 2021-11-12 PROCEDURE — 93010 ELECTROCARDIOGRAM REPORT: CPT | Mod: S$GLB,,, | Performed by: INTERNAL MEDICINE

## 2021-11-12 PROCEDURE — 1159F PR MEDICATION LIST DOCUMENTED IN MEDICAL RECORD: ICD-10-PCS | Mod: CPTII,S$GLB,, | Performed by: SURGERY

## 2021-11-12 PROCEDURE — 1159F MED LIST DOCD IN RCRD: CPT | Mod: CPTII,S$GLB,, | Performed by: SURGERY

## 2021-11-12 PROCEDURE — 3008F BODY MASS INDEX DOCD: CPT | Mod: CPTII,S$GLB,, | Performed by: SURGERY

## 2021-11-12 PROCEDURE — 99205 PR OFFICE/OUTPT VISIT, NEW, LEVL V, 60-74 MIN: ICD-10-PCS | Mod: S$GLB,,, | Performed by: SURGERY

## 2021-11-12 PROCEDURE — 76642 ULTRASOUND BREAST LIMITED: CPT | Mod: TC,RT

## 2021-11-12 PROCEDURE — 99205 OFFICE O/P NEW HI 60 MIN: CPT | Mod: S$GLB,,, | Performed by: SURGERY

## 2021-11-12 PROCEDURE — 1160F RVW MEDS BY RX/DR IN RCRD: CPT | Mod: CPTII,S$GLB,, | Performed by: SURGERY

## 2021-11-12 PROCEDURE — 93010 EKG 12-LEAD: ICD-10-PCS | Mod: S$GLB,,, | Performed by: INTERNAL MEDICINE

## 2021-11-12 PROCEDURE — 3008F PR BODY MASS INDEX (BMI) DOCUMENTED: ICD-10-PCS | Mod: CPTII,S$GLB,, | Performed by: SURGERY

## 2021-11-12 PROCEDURE — 76642 ULTRASOUND BREAST LIMITED: CPT | Mod: 26,RT,, | Performed by: RADIOLOGY

## 2021-11-12 PROCEDURE — 76642 US BREAST RIGHT LIMITED: ICD-10-PCS | Mod: 26,RT,, | Performed by: RADIOLOGY

## 2021-11-12 PROCEDURE — 99999 PR PBB SHADOW E&M-EST. PATIENT-LVL III: ICD-10-PCS | Mod: PBBFAC,,, | Performed by: SURGERY

## 2021-11-12 PROCEDURE — 3077F PR MOST RECENT SYSTOLIC BLOOD PRESSURE >= 140 MM HG: ICD-10-PCS | Mod: CPTII,S$GLB,, | Performed by: SURGERY

## 2021-11-12 PROCEDURE — 1160F PR REVIEW ALL MEDS BY PRESCRIBER/CLIN PHARMACIST DOCUMENTED: ICD-10-PCS | Mod: CPTII,S$GLB,, | Performed by: SURGERY

## 2021-11-12 PROCEDURE — 99999 PR PBB SHADOW E&M-EST. PATIENT-LVL III: CPT | Mod: PBBFAC,,, | Performed by: SURGERY

## 2021-11-12 PROCEDURE — 3078F DIAST BP <80 MM HG: CPT | Mod: CPTII,S$GLB,, | Performed by: SURGERY

## 2021-11-26 ENCOUNTER — ANESTHESIA EVENT (OUTPATIENT)
Dept: SURGERY | Facility: HOSPITAL | Age: 66
End: 2021-11-26
Payer: MEDICARE

## 2021-11-26 ENCOUNTER — TELEPHONE (OUTPATIENT)
Dept: SURGERY | Facility: CLINIC | Age: 66
End: 2021-11-26
Payer: MEDICARE

## 2021-11-26 ENCOUNTER — PATIENT MESSAGE (OUTPATIENT)
Dept: SURGERY | Facility: CLINIC | Age: 66
End: 2021-11-26
Payer: MEDICARE

## 2021-12-01 ENCOUNTER — HOSPITAL ENCOUNTER (OUTPATIENT)
Dept: RADIOLOGY | Facility: HOSPITAL | Age: 66
Discharge: HOME | End: 2021-12-01
Attending: SURGERY
Payer: MEDICARE

## 2021-12-01 DIAGNOSIS — R92.8 ABNORMAL FINDING ON BREAST IMAGING: ICD-10-CM

## 2021-12-01 DIAGNOSIS — C50.912 INVASIVE DUCTAL CARCINOMA OF BREAST, LEFT: ICD-10-CM

## 2021-12-01 PROCEDURE — A4648 IMPLANTABLE TISSUE MARKER: HCPCS

## 2021-12-01 PROCEDURE — 25000003 PHARM REV CODE 250: Performed by: SURGERY

## 2021-12-01 PROCEDURE — 19285 US BREAST RADAR REFLECTOR LOCALIZATION W/GUIDANCE, 1ST LESION, LEFT: ICD-10-PCS | Mod: LT,,, | Performed by: RADIOLOGY

## 2021-12-01 PROCEDURE — 77065 DX MAMMO INCL CAD UNI: CPT | Mod: TC,LT

## 2021-12-01 PROCEDURE — 19285 PERQ DEV BREAST 1ST US IMAG: CPT | Mod: LT,,, | Performed by: RADIOLOGY

## 2021-12-01 PROCEDURE — 77065 DX MAMMO INCL CAD UNI: CPT | Mod: 26,LT,, | Performed by: RADIOLOGY

## 2021-12-01 PROCEDURE — 77065 MAMMO DIGITAL DIAGNOSTIC LEFT: ICD-10-PCS | Mod: 26,LT,, | Performed by: RADIOLOGY

## 2021-12-01 RX ORDER — LIDOCAINE HYDROCHLORIDE 20 MG/ML
10 INJECTION, SOLUTION INFILTRATION; PERINEURAL ONCE
Status: COMPLETED | OUTPATIENT
Start: 2021-12-01 | End: 2021-12-01

## 2021-12-01 RX ADMIN — LIDOCAINE HYDROCHLORIDE 10 ML: 20 INJECTION, SOLUTION INFILTRATION; PERINEURAL at 11:12

## 2021-12-02 ENCOUNTER — TELEPHONE (OUTPATIENT)
Dept: SURGERY | Facility: CLINIC | Age: 66
End: 2021-12-02
Payer: MEDICARE

## 2021-12-02 RX ORDER — HYDROCODONE BITARTRATE AND ACETAMINOPHEN 5; 325 MG/1; MG/1
1 TABLET ORAL EVERY 6 HOURS PRN
Qty: 10 TABLET | Refills: 0 | Status: SHIPPED | OUTPATIENT
Start: 2021-12-02 | End: 2022-03-10

## 2021-12-03 ENCOUNTER — HOSPITAL ENCOUNTER (OUTPATIENT)
Facility: HOSPITAL | Age: 66
Discharge: HOME | End: 2021-12-03
Attending: SURGERY | Admitting: SURGERY
Payer: MEDICARE

## 2021-12-03 ENCOUNTER — ANESTHESIA (OUTPATIENT)
Dept: SURGERY | Facility: HOSPITAL | Age: 66
End: 2021-12-03
Payer: MEDICARE

## 2021-12-03 ENCOUNTER — HOSPITAL ENCOUNTER (OUTPATIENT)
Dept: RADIOLOGY | Facility: HOSPITAL | Age: 66
Discharge: HOME | End: 2021-12-03
Attending: SURGERY
Payer: MEDICARE

## 2021-12-03 VITALS
RESPIRATION RATE: 18 BRPM | OXYGEN SATURATION: 97 % | HEART RATE: 66 BPM | SYSTOLIC BLOOD PRESSURE: 158 MMHG | BODY MASS INDEX: 28.93 KG/M2 | DIASTOLIC BLOOD PRESSURE: 65 MMHG | WEIGHT: 180 LBS | HEIGHT: 66 IN | TEMPERATURE: 98 F

## 2021-12-03 DIAGNOSIS — R92.8 ABNORMAL FINDING ON BREAST IMAGING: ICD-10-CM

## 2021-12-03 DIAGNOSIS — C50.412 MALIGNANT NEOPLASM OF UPPER-OUTER QUADRANT OF LEFT BREAST IN FEMALE, ESTROGEN RECEPTOR POSITIVE: ICD-10-CM

## 2021-12-03 DIAGNOSIS — C50.912 INVASIVE DUCTAL CARCINOMA OF BREAST, LEFT: Primary | ICD-10-CM

## 2021-12-03 DIAGNOSIS — C50.912 INVASIVE DUCTAL CARCINOMA OF BREAST, LEFT: ICD-10-CM

## 2021-12-03 DIAGNOSIS — Z17.0 MALIGNANT NEOPLASM OF UPPER-OUTER QUADRANT OF LEFT BREAST IN FEMALE, ESTROGEN RECEPTOR POSITIVE: ICD-10-CM

## 2021-12-03 PROCEDURE — 88307 PR  SURG PATH,LEVEL V: ICD-10-PCS | Mod: 26,,, | Performed by: PATHOLOGY

## 2021-12-03 PROCEDURE — 63600175 PHARM REV CODE 636 W HCPCS: Performed by: STUDENT IN AN ORGANIZED HEALTH CARE EDUCATION/TRAINING PROGRAM

## 2021-12-03 PROCEDURE — 63600175 PHARM REV CODE 636 W HCPCS: Mod: JW,JG | Performed by: SURGERY

## 2021-12-03 PROCEDURE — 38792 RA TRACER ID OF SENTINL NODE: CPT | Mod: 59,LT,, | Performed by: SURGERY

## 2021-12-03 PROCEDURE — 88307 TISSUE EXAM BY PATHOLOGIST: CPT | Performed by: PATHOLOGY

## 2021-12-03 PROCEDURE — 88342 CHG IMMUNOCYTOCHEMISTRY: ICD-10-PCS | Mod: 26,,, | Performed by: PATHOLOGY

## 2021-12-03 PROCEDURE — 88342 IMHCHEM/IMCYTCHM 1ST ANTB: CPT | Performed by: PATHOLOGY

## 2021-12-03 PROCEDURE — 63600175 PHARM REV CODE 636 W HCPCS: Performed by: ANESTHESIOLOGY

## 2021-12-03 PROCEDURE — 88307 TISSUE EXAM BY PATHOLOGIST: CPT | Mod: 26,,, | Performed by: PATHOLOGY

## 2021-12-03 PROCEDURE — 88341 IMHCHEM/IMCYTCHM EA ADD ANTB: CPT | Mod: 26,,, | Performed by: PATHOLOGY

## 2021-12-03 PROCEDURE — 38900 PR INTRAOPERATIVE SENTINEL LYMPH NODE ID W DYE INJECTION: ICD-10-PCS | Mod: LT,,, | Performed by: SURGERY

## 2021-12-03 PROCEDURE — 25000003 PHARM REV CODE 250: Performed by: NURSE ANESTHETIST, CERTIFIED REGISTERED

## 2021-12-03 PROCEDURE — 38900 IO MAP OF SENT LYMPH NODE: CPT | Mod: LT,,, | Performed by: SURGERY

## 2021-12-03 PROCEDURE — 63600175 PHARM REV CODE 636 W HCPCS: Performed by: SURGERY

## 2021-12-03 PROCEDURE — 64461 PVB THORACIC SINGLE INJ SITE: CPT | Mod: 59,LT | Performed by: ANESTHESIOLOGY

## 2021-12-03 PROCEDURE — 71000033 HC RECOVERY, INTIAL HOUR: Performed by: SURGERY

## 2021-12-03 PROCEDURE — 64461 PR PVB THORACIC SINGLE INJ SITE, INCL IMAGING: ICD-10-PCS | Mod: 59,LT,, | Performed by: ANESTHESIOLOGY

## 2021-12-03 PROCEDURE — 88341 IMHCHEM/IMCYTCHM EA ADD ANTB: CPT | Performed by: PATHOLOGY

## 2021-12-03 PROCEDURE — 38525 BIOPSY/REMOVAL LYMPH NODES: CPT | Mod: 51,LT,, | Performed by: SURGERY

## 2021-12-03 PROCEDURE — 76098 X-RAY EXAM SURGICAL SPECIMEN: CPT | Mod: 26,,, | Performed by: RADIOLOGY

## 2021-12-03 PROCEDURE — 64462 PVB THORACIC 2ND+ INJ SITE: CPT | Mod: ,,, | Performed by: ANESTHESIOLOGY

## 2021-12-03 PROCEDURE — 19301 PR MASTECTOMY, PARTIAL: ICD-10-PCS | Mod: LT,,, | Performed by: SURGERY

## 2021-12-03 PROCEDURE — 88341 PR IHC OR ICC EACH ADD'L SINGLE ANTIBODY  STAINPR: ICD-10-PCS | Mod: 26,,, | Performed by: PATHOLOGY

## 2021-12-03 PROCEDURE — 76098 MAMMO BREAST SPECIMEN: ICD-10-PCS | Mod: 26,,, | Performed by: RADIOLOGY

## 2021-12-03 PROCEDURE — 71000015 HC POSTOP RECOV 1ST HR: Performed by: SURGERY

## 2021-12-03 PROCEDURE — D9220A PRA ANESTHESIA: Mod: ANES,,, | Performed by: ANESTHESIOLOGY

## 2021-12-03 PROCEDURE — 25000003 PHARM REV CODE 250: Performed by: STUDENT IN AN ORGANIZED HEALTH CARE EDUCATION/TRAINING PROGRAM

## 2021-12-03 PROCEDURE — 25000003 PHARM REV CODE 250: Performed by: ANESTHESIOLOGY

## 2021-12-03 PROCEDURE — 76098 X-RAY EXAM SURGICAL SPECIMEN: CPT | Mod: TC

## 2021-12-03 PROCEDURE — 63600175 PHARM REV CODE 636 W HCPCS: Performed by: NURSE ANESTHETIST, CERTIFIED REGISTERED

## 2021-12-03 PROCEDURE — 25000003 PHARM REV CODE 250: Performed by: SURGERY

## 2021-12-03 PROCEDURE — 94761 N-INVAS EAR/PLS OXIMETRY MLT: CPT

## 2021-12-03 PROCEDURE — 27201423 OPTIME MED/SURG SUP & DEVICES STERILE SUPPLY: Performed by: SURGERY

## 2021-12-03 PROCEDURE — 36000706: Performed by: SURGERY

## 2021-12-03 PROCEDURE — 38525 PR BIOPSY/REM LYMPH NODES, AXILLARY: ICD-10-PCS | Mod: 51,LT,, | Performed by: SURGERY

## 2021-12-03 PROCEDURE — 64462 PR PVB THORACIC 2ND AND ANY ADDL INJ SITE, INCL IMG GUIDE: ICD-10-PCS | Mod: ,,, | Performed by: ANESTHESIOLOGY

## 2021-12-03 PROCEDURE — 38792 PR IDENTIFY SENTINEL 2DE: ICD-10-PCS | Mod: 59,LT,, | Performed by: SURGERY

## 2021-12-03 PROCEDURE — 36000707: Performed by: SURGERY

## 2021-12-03 PROCEDURE — D9220A PRA ANESTHESIA: ICD-10-PCS | Mod: ANES,,, | Performed by: ANESTHESIOLOGY

## 2021-12-03 PROCEDURE — 37000009 HC ANESTHESIA EA ADD 15 MINS: Performed by: SURGERY

## 2021-12-03 PROCEDURE — 64461 PVB THORACIC SINGLE INJ SITE: CPT | Mod: 59,LT,, | Performed by: ANESTHESIOLOGY

## 2021-12-03 PROCEDURE — C1894 INTRO/SHEATH, NON-LASER: HCPCS | Performed by: SURGERY

## 2021-12-03 PROCEDURE — 37000008 HC ANESTHESIA 1ST 15 MINUTES: Performed by: SURGERY

## 2021-12-03 PROCEDURE — 19301 PARTIAL MASTECTOMY: CPT | Mod: LT,,, | Performed by: SURGERY

## 2021-12-03 PROCEDURE — D9220A PRA ANESTHESIA: Mod: CRNA,,, | Performed by: NURSE ANESTHETIST, CERTIFIED REGISTERED

## 2021-12-03 PROCEDURE — 99900035 HC TECH TIME PER 15 MIN (STAT)

## 2021-12-03 PROCEDURE — D9220A PRA ANESTHESIA: ICD-10-PCS | Mod: CRNA,,, | Performed by: NURSE ANESTHETIST, CERTIFIED REGISTERED

## 2021-12-03 PROCEDURE — 88342 IMHCHEM/IMCYTCHM 1ST ANTB: CPT | Mod: 26,,, | Performed by: PATHOLOGY

## 2021-12-03 RX ORDER — MIDAZOLAM HYDROCHLORIDE 1 MG/ML
.5-4 INJECTION INTRAMUSCULAR; INTRAVENOUS
Status: DISPENSED | OUTPATIENT
Start: 2021-12-03

## 2021-12-03 RX ORDER — PROPOFOL 10 MG/ML
INJECTION, EMULSION INTRAVENOUS
Status: DISCONTINUED | OUTPATIENT
Start: 2021-12-03 | End: 2021-12-03

## 2021-12-03 RX ORDER — ACETAMINOPHEN 500 MG
1000 TABLET ORAL
Status: COMPLETED | OUTPATIENT
Start: 2021-12-03 | End: 2021-12-03

## 2021-12-03 RX ORDER — LIDOCAINE HCL/PF 100 MG/5ML
SYRINGE (ML) INTRAVENOUS
Status: DISCONTINUED | OUTPATIENT
Start: 2021-12-03 | End: 2021-12-03

## 2021-12-03 RX ORDER — BUPIVACAINE HYDROCHLORIDE 5 MG/ML
INJECTION, SOLUTION EPIDURAL; INTRACAUDAL
Status: DISCONTINUED | OUTPATIENT
Start: 2021-12-03 | End: 2021-12-03

## 2021-12-03 RX ORDER — SODIUM CHLORIDE 9 MG/ML
INJECTION, SOLUTION INTRAVENOUS CONTINUOUS
Status: ACTIVE | OUTPATIENT
Start: 2021-12-03

## 2021-12-03 RX ORDER — DIPHENHYDRAMINE HYDROCHLORIDE 50 MG/ML
25 INJECTION INTRAMUSCULAR; INTRAVENOUS EVERY 6 HOURS PRN
Status: DISCONTINUED | OUTPATIENT
Start: 2021-12-03 | End: 2021-12-03 | Stop reason: HOSPADM

## 2021-12-03 RX ORDER — DEXAMETHASONE SODIUM PHOSPHATE 4 MG/ML
INJECTION, SOLUTION INTRA-ARTICULAR; INTRALESIONAL; INTRAMUSCULAR; INTRAVENOUS; SOFT TISSUE
Status: DISCONTINUED | OUTPATIENT
Start: 2021-12-03 | End: 2021-12-03

## 2021-12-03 RX ORDER — LIDOCAINE HYDROCHLORIDE 10 MG/ML
1 INJECTION, SOLUTION EPIDURAL; INFILTRATION; INTRACAUDAL; PERINEURAL ONCE
Status: ACTIVE | OUTPATIENT
Start: 2021-12-03

## 2021-12-03 RX ORDER — CELECOXIB 200 MG/1
400 CAPSULE ORAL ONCE
Status: DISPENSED | OUTPATIENT
Start: 2021-12-03

## 2021-12-03 RX ORDER — ISOSULFAN BLUE 50 MG/5ML
INJECTION, SOLUTION SUBCUTANEOUS
Status: DISCONTINUED | OUTPATIENT
Start: 2021-12-03 | End: 2021-12-03 | Stop reason: HOSPADM

## 2021-12-03 RX ORDER — CEFAZOLIN SODIUM 1 G/3ML
2 INJECTION, POWDER, FOR SOLUTION INTRAMUSCULAR; INTRAVENOUS
Status: COMPLETED | OUTPATIENT
Start: 2021-12-03 | End: 2021-12-03

## 2021-12-03 RX ORDER — FENTANYL CITRATE 50 UG/ML
25 INJECTION, SOLUTION INTRAMUSCULAR; INTRAVENOUS EVERY 5 MIN PRN
Status: DISCONTINUED | OUTPATIENT
Start: 2021-12-03 | End: 2021-12-03 | Stop reason: HOSPADM

## 2021-12-03 RX ORDER — LORAZEPAM 2 MG/ML
0.25 INJECTION INTRAMUSCULAR ONCE AS NEEDED
Status: DISCONTINUED | OUTPATIENT
Start: 2021-12-03 | End: 2021-12-03 | Stop reason: HOSPADM

## 2021-12-03 RX ORDER — MIDAZOLAM HYDROCHLORIDE 1 MG/ML
INJECTION INTRAMUSCULAR; INTRAVENOUS
Status: DISCONTINUED | OUTPATIENT
Start: 2021-12-03 | End: 2021-12-03

## 2021-12-03 RX ORDER — PROPOFOL 10 MG/ML
INJECTION, EMULSION INTRAVENOUS CONTINUOUS PRN
Status: DISCONTINUED | OUTPATIENT
Start: 2021-12-03 | End: 2021-12-03

## 2021-12-03 RX ORDER — ONDANSETRON 2 MG/ML
INJECTION INTRAMUSCULAR; INTRAVENOUS
Status: DISCONTINUED | OUTPATIENT
Start: 2021-12-03 | End: 2021-12-03

## 2021-12-03 RX ORDER — FAMOTIDINE 10 MG/ML
INJECTION INTRAVENOUS
Status: DISCONTINUED | OUTPATIENT
Start: 2021-12-03 | End: 2021-12-03

## 2021-12-03 RX ORDER — LIDOCAINE HYDROCHLORIDE 20 MG/ML
INJECTION, SOLUTION EPIDURAL; INFILTRATION; INTRACAUDAL; PERINEURAL
Status: DISCONTINUED
Start: 2021-12-03 | End: 2021-12-03 | Stop reason: HOSPADM

## 2021-12-03 RX ORDER — FENTANYL CITRATE 50 UG/ML
25-200 INJECTION, SOLUTION INTRAMUSCULAR; INTRAVENOUS
Status: DISPENSED | OUTPATIENT
Start: 2021-12-03

## 2021-12-03 RX ADMIN — ONDANSETRON 4 MG: 2 INJECTION, SOLUTION INTRAMUSCULAR; INTRAVENOUS at 07:12

## 2021-12-03 RX ADMIN — PROPOFOL 30 MG: 10 INJECTION, EMULSION INTRAVENOUS at 07:12

## 2021-12-03 RX ADMIN — MIDAZOLAM 2 MG: 1 INJECTION INTRAMUSCULAR; INTRAVENOUS at 06:12

## 2021-12-03 RX ADMIN — BUPIVACAINE HYDROCHLORIDE 15 ML: 5 INJECTION, SOLUTION EPIDURAL; INTRACAUDAL; PERINEURAL at 06:12

## 2021-12-03 RX ADMIN — FENTANYL CITRATE 25 MCG: 50 INJECTION INTRAMUSCULAR; INTRAVENOUS at 09:12

## 2021-12-03 RX ADMIN — FAMOTIDINE 20 MG: 10 INJECTION, SOLUTION INTRAVENOUS at 07:12

## 2021-12-03 RX ADMIN — PROPOFOL 100 MCG/KG/MIN: 10 INJECTION, EMULSION INTRAVENOUS at 07:12

## 2021-12-03 RX ADMIN — SODIUM CHLORIDE: 0.9 INJECTION, SOLUTION INTRAVENOUS at 06:12

## 2021-12-03 RX ADMIN — ACETAMINOPHEN 1000 MG: 500 TABLET ORAL at 06:12

## 2021-12-03 RX ADMIN — LIDOCAINE HYDROCHLORIDE 60 MG: 20 INJECTION, SOLUTION INTRAVENOUS at 07:12

## 2021-12-03 RX ADMIN — FENTANYL CITRATE 100 MCG: 50 INJECTION INTRAMUSCULAR; INTRAVENOUS at 06:12

## 2021-12-03 RX ADMIN — SODIUM CHLORIDE, SODIUM GLUCONATE, SODIUM ACETATE, POTASSIUM CHLORIDE, MAGNESIUM CHLORIDE, SODIUM PHOSPHATE, DIBASIC, AND POTASSIUM PHOSPHATE: .53; .5; .37; .037; .03; .012; .00082 INJECTION, SOLUTION INTRAVENOUS at 08:12

## 2021-12-03 RX ADMIN — CEFAZOLIN 2 G: 330 INJECTION, POWDER, FOR SOLUTION INTRAMUSCULAR; INTRAVENOUS at 07:12

## 2021-12-03 RX ADMIN — MIDAZOLAM HYDROCHLORIDE 2 MG: 1 INJECTION, SOLUTION INTRAMUSCULAR; INTRAVENOUS at 07:12

## 2021-12-03 RX ADMIN — DEXAMETHASONE SODIUM PHOSPHATE 8 MG: 4 INJECTION, SOLUTION INTRAMUSCULAR; INTRAVENOUS at 07:12

## 2021-12-10 ENCOUNTER — HOSPITAL ENCOUNTER (OUTPATIENT)
Dept: RADIOLOGY | Facility: HOSPITAL | Age: 66
Discharge: HOME | End: 2021-12-10
Payer: MEDICARE

## 2021-12-10 DIAGNOSIS — N63.0 BREAST LUMP: ICD-10-CM

## 2021-12-10 PROCEDURE — 76098 X-RAY EXAM SURGICAL SPECIMEN: CPT | Mod: TC

## 2021-12-13 ENCOUNTER — HOSPITAL ENCOUNTER (OUTPATIENT)
Dept: RADIOLOGY | Facility: HOSPITAL | Age: 66
Discharge: HOME | End: 2021-12-13
Payer: MEDICARE

## 2021-12-13 DIAGNOSIS — N63.0 BREAST LUMP: ICD-10-CM

## 2021-12-13 PROCEDURE — 76098 X-RAY EXAM SURGICAL SPECIMEN: CPT | Mod: TC

## 2021-12-20 ENCOUNTER — TUMOR BOARD CONFERENCE (OUTPATIENT)
Dept: SURGERY | Facility: CLINIC | Age: 66
End: 2021-12-20
Payer: MEDICARE

## 2021-12-20 ENCOUNTER — OFFICE VISIT (OUTPATIENT)
Dept: OBSTETRICS AND GYNECOLOGY | Facility: CLINIC | Age: 66
End: 2021-12-20
Payer: MEDICARE

## 2021-12-20 VITALS
DIASTOLIC BLOOD PRESSURE: 80 MMHG | WEIGHT: 183.63 LBS | HEIGHT: 66 IN | BODY MASS INDEX: 29.51 KG/M2 | SYSTOLIC BLOOD PRESSURE: 152 MMHG

## 2021-12-20 DIAGNOSIS — Z01.419 WELL WOMAN EXAM WITH ROUTINE GYNECOLOGICAL EXAM: Primary | ICD-10-CM

## 2021-12-20 PROCEDURE — 99999 PR PBB SHADOW E&M-EST. PATIENT-LVL III: ICD-10-PCS | Mod: PBBFAC,,, | Performed by: OBSTETRICS & GYNECOLOGY

## 2021-12-20 PROCEDURE — G0101 CA SCREEN;PELVIC/BREAST EXAM: HCPCS | Mod: S$GLB,,, | Performed by: OBSTETRICS & GYNECOLOGY

## 2021-12-20 PROCEDURE — G0101 PR CA SCREEN;PELVIC/BREAST EXAM: ICD-10-PCS | Mod: S$GLB,,, | Performed by: OBSTETRICS & GYNECOLOGY

## 2021-12-20 PROCEDURE — 99999 PR PBB SHADOW E&M-EST. PATIENT-LVL III: CPT | Mod: PBBFAC,,, | Performed by: OBSTETRICS & GYNECOLOGY

## 2021-12-20 RX ORDER — ATORVASTATIN CALCIUM 40 MG/1
40 TABLET, FILM COATED ORAL DAILY
COMMUNITY
End: 2024-02-29 | Stop reason: SDUPTHER

## 2021-12-21 ENCOUNTER — TELEPHONE (OUTPATIENT)
Dept: SURGERY | Facility: CLINIC | Age: 66
End: 2021-12-21
Payer: MEDICARE

## 2021-12-21 ENCOUNTER — OFFICE VISIT (OUTPATIENT)
Dept: SURGERY | Facility: CLINIC | Age: 66
End: 2021-12-21
Payer: MEDICARE

## 2021-12-21 ENCOUNTER — DOCUMENTATION ONLY (OUTPATIENT)
Dept: HEMATOLOGY/ONCOLOGY | Facility: CLINIC | Age: 66
End: 2021-12-21
Payer: MEDICARE

## 2021-12-21 VITALS
WEIGHT: 183 LBS | BODY MASS INDEX: 29.41 KG/M2 | HEIGHT: 66 IN | HEART RATE: 79 BPM | SYSTOLIC BLOOD PRESSURE: 152 MMHG | DIASTOLIC BLOOD PRESSURE: 92 MMHG

## 2021-12-21 DIAGNOSIS — C50.412 MALIGNANT NEOPLASM OF UPPER-OUTER QUADRANT OF LEFT BREAST IN FEMALE, ESTROGEN RECEPTOR POSITIVE: Primary | ICD-10-CM

## 2021-12-21 DIAGNOSIS — C50.912 INVASIVE DUCTAL CARCINOMA OF BREAST, LEFT: Primary | ICD-10-CM

## 2021-12-21 DIAGNOSIS — Z17.0 MALIGNANT NEOPLASM OF UPPER-OUTER QUADRANT OF LEFT BREAST IN FEMALE, ESTROGEN RECEPTOR POSITIVE: Primary | ICD-10-CM

## 2021-12-21 PROCEDURE — 99024 POSTOP FOLLOW-UP VISIT: CPT | Mod: S$GLB,,, | Performed by: SURGERY

## 2021-12-21 PROCEDURE — 99024 PR POST-OP FOLLOW-UP VISIT: ICD-10-PCS | Mod: S$GLB,,, | Performed by: SURGERY

## 2021-12-21 RX ORDER — CEPHALEXIN 250 MG/1
250 CAPSULE ORAL 4 TIMES DAILY
Qty: 28 CAPSULE | Refills: 0 | Status: SHIPPED | OUTPATIENT
Start: 2021-12-21 | End: 2021-12-28

## 2021-12-23 ENCOUNTER — HOSPITAL ENCOUNTER (OUTPATIENT)
Dept: RADIOLOGY | Facility: HOSPITAL | Age: 66
Discharge: HOME | End: 2021-12-23
Attending: SURGERY
Payer: MEDICARE

## 2021-12-23 DIAGNOSIS — C50.912 INVASIVE DUCTAL CARCINOMA OF BREAST, LEFT: ICD-10-CM

## 2021-12-23 PROCEDURE — 77049 MRI BREAST C-+ W/CAD BI: CPT | Mod: TC

## 2021-12-23 PROCEDURE — 77049 MRI BREAST C-+ W/CAD BI: CPT | Mod: 26,,, | Performed by: RADIOLOGY

## 2021-12-23 PROCEDURE — 25500020 PHARM REV CODE 255: Performed by: SURGERY

## 2021-12-23 PROCEDURE — 77049 MRI BREAST W/WO CONTRAST, W/CAD, BILATERAL: ICD-10-PCS | Mod: 26,,, | Performed by: RADIOLOGY

## 2021-12-23 PROCEDURE — A9577 INJ MULTIHANCE: HCPCS | Performed by: SURGERY

## 2021-12-23 RX ADMIN — GADOBENATE DIMEGLUMINE 18 ML: 529 INJECTION, SOLUTION INTRAVENOUS at 11:12

## 2021-12-28 ENCOUNTER — TELEPHONE (OUTPATIENT)
Dept: SURGERY | Facility: CLINIC | Age: 66
End: 2021-12-28
Payer: MEDICARE

## 2021-12-30 LAB
COMMENT: NORMAL
FINAL PATHOLOGIC DIAGNOSIS: NORMAL
GROSS: NORMAL
Lab: NORMAL
SUPPLEMENTAL DIAGNOSIS: NORMAL

## 2022-01-03 ENCOUNTER — TELEPHONE (OUTPATIENT)
Dept: HEMATOLOGY/ONCOLOGY | Facility: CLINIC | Age: 67
End: 2022-01-03
Payer: MEDICARE

## 2022-01-03 NOTE — TELEPHONE ENCOUNTER
Per Dr Chi's request, I reached out to the patient to see if she had made her decision about going forward with her surgery.  The patient informed me she was seeking a second opinion elsewhere.  I provided her with my direct number and to call if she needed any assistance.  Patient verbalized understanding.

## 2022-01-04 ENCOUNTER — TELEPHONE (OUTPATIENT)
Dept: SURGERY | Facility: CLINIC | Age: 67
End: 2022-01-04
Payer: MEDICARE

## 2022-01-04 NOTE — TELEPHONE ENCOUNTER
Pt stated she is going elsewhere for surgery, pt requested to cancel procedure on 1/14. Informed Dr. Chi and Jerardo, Nurse Navigator of patient's decision.

## 2022-03-09 ENCOUNTER — TELEPHONE (OUTPATIENT)
Dept: HEMATOLOGY/ONCOLOGY | Facility: CLINIC | Age: 67
End: 2022-03-09
Payer: MEDICARE

## 2022-03-09 NOTE — TELEPHONE ENCOUNTER
I spoke staff at Dr Brandon Dunbar's office regarding the 02/07/22 op report that the  has not received after multiple requests.  The patient self scheduled with Dr Bean for 03/10/22.  I provided my fax number as well as my direct number in an attempt to get the report prior to her appt with medical oncology tomorrow.

## 2022-03-10 ENCOUNTER — OFFICE VISIT (OUTPATIENT)
Dept: HEMATOLOGY/ONCOLOGY | Facility: CLINIC | Age: 67
End: 2022-03-10
Payer: MEDICARE

## 2022-03-10 VITALS
HEART RATE: 76 BPM | TEMPERATURE: 98 F | SYSTOLIC BLOOD PRESSURE: 138 MMHG | RESPIRATION RATE: 16 BRPM | OXYGEN SATURATION: 98 % | HEIGHT: 66 IN | DIASTOLIC BLOOD PRESSURE: 66 MMHG | WEIGHT: 173.5 LBS | BODY MASS INDEX: 27.88 KG/M2

## 2022-03-10 DIAGNOSIS — Z17.0 MALIGNANT NEOPLASM OF UPPER-OUTER QUADRANT OF LEFT BREAST IN FEMALE, ESTROGEN RECEPTOR POSITIVE: Primary | ICD-10-CM

## 2022-03-10 DIAGNOSIS — C50.412 MALIGNANT NEOPLASM OF UPPER-OUTER QUADRANT OF LEFT BREAST IN FEMALE, ESTROGEN RECEPTOR POSITIVE: Primary | ICD-10-CM

## 2022-03-10 PROCEDURE — 99204 OFFICE O/P NEW MOD 45 MIN: CPT | Mod: S$PBB,,, | Performed by: INTERNAL MEDICINE

## 2022-03-10 PROCEDURE — 99999 PR PBB SHADOW E&M-EST. PATIENT-LVL III: CPT | Mod: PBBFAC,,, | Performed by: INTERNAL MEDICINE

## 2022-03-10 PROCEDURE — 99999 PR PBB SHADOW E&M-EST. PATIENT-LVL III: ICD-10-PCS | Mod: PBBFAC,,, | Performed by: INTERNAL MEDICINE

## 2022-03-10 PROCEDURE — 99213 OFFICE O/P EST LOW 20 MIN: CPT | Mod: PBBFAC | Performed by: INTERNAL MEDICINE

## 2022-03-10 PROCEDURE — 99204 PR OFFICE/OUTPT VISIT, NEW, LEVL IV, 45-59 MIN: ICD-10-PCS | Mod: S$PBB,,, | Performed by: INTERNAL MEDICINE

## 2022-03-10 RX ORDER — LETROZOLE 2.5 MG/1
2.5 TABLET, FILM COATED ORAL DAILY
Qty: 90 TABLET | Refills: 3 | Status: SHIPPED | OUTPATIENT
Start: 2022-03-10 | End: 2023-02-14

## 2022-03-10 NOTE — PROGRESS NOTES
Subjective:       Patient ID: Cara Sharp is a 66 y.o. female.    Chief Complaint: No chief complaint on file.    HPI 66-year-old female seen in multidisciplinary breast Oncology Clinic with a recent diagnosis of left breast cancer.  She is a patient of .    Mammogram on October 12, 2020 demonstrated an irregular mass in the upper outer quadrant left breast measuring 1.4 cm. Ultrasound showed a 1.2 cm hypoechoic mass.    On October 25, 2021 biopsy was performed which showed infiltrating ductal carcinoma with lobular features with some associated intermediate grade DCIS (histologic grade 3, nuclear grade 2, mitotic index 1).  Tumor cells were 96% ER positive, 95% DC positive, HER2 was 1+ and Ki-67 was 9%.  Largest focus was 10 mm.    Pre surgical CT of the abdomen and pelvis on 2/3/21  showed a 9 mm left hepatic density and a 1 cm LLL pulmonary nodule.    On 2/7/22 she underwent bilateral nipple sparing mastectomy with autologous reconstruction - KOKO flaps  ([facility] - Dr. Butcher)  The pathology from that surgery showed 2 mm of residual invasive cancer in the left breast with 3 negative sentinel lymph nodes.T1bNo.  The right breast showed no significant abnormality.    Oncotype score was 6.     She has had a post -op hematoma in the left breast reconstruction which has required  needle aspiration.    Menstrual History:    Menarche -13    G - 2  P - 2  First birth age -24 ,BCP - several years    Menopause -  50s   HRT - about 10 years until October 2021    Family History -                                 Breast - mother with Paget's in her 80s                                Ovarian -  Maternal aunt                                  Other - father with lung - smoker    Genetics - negative BRCA 1 and 2    Social History :    Smoking -never    ETOH - occasional         PMH:Thyroid nodules S/P left thyroidectomy. HLP  Review of Systems   Constitutional: Negative for activity change, appetite change,  fever and unexpected weight change.   HENT: Positive for hearing loss. Negative for mouth sores.    Eyes: Negative for visual disturbance.   Respiratory: Negative for cough and shortness of breath.    Cardiovascular: Negative for chest pain.   Gastrointestinal: Negative for abdominal pain and diarrhea.   Genitourinary: Negative.  Negative for frequency.   Musculoskeletal: Negative for back pain.   Integumentary:  Negative for rash.   Neurological: Negative for headaches.   Hematological: Negative for adenopathy.   Psychiatric/Behavioral: The patient is not nervous/anxious.          Objective:      Physical Exam  Vitals reviewed.   Constitutional:       General: She is not in acute distress.  HENT:      Head: Normocephalic.      Mouth/Throat:      Mouth: Mucous membranes are moist.      Pharynx: Oropharynx is clear. No oropharyngeal exudate or posterior oropharyngeal erythema.   Eyes:      General: No scleral icterus.  Cardiovascular:      Rate and Rhythm: Normal rate and regular rhythm.   Pulmonary:      Effort: Pulmonary effort is normal. No respiratory distress.      Breath sounds: Normal breath sounds. No wheezing or rales.   Chest:   Breasts:      Right: No axillary adenopathy or supraclavicular adenopathy.      Left: No axillary adenopathy or supraclavicular adenopathy.         Abdominal:      Palpations: Abdomen is soft. There is no mass.      Tenderness: There is no abdominal tenderness.   Lymphadenopathy:      Cervical: No cervical adenopathy.      Upper Body:      Right upper body: No supraclavicular or axillary adenopathy.      Left upper body: No supraclavicular or axillary adenopathy.   Skin:     Findings: No rash.   Neurological:      Mental Status: She is alert and oriented to person, place, and time.   Psychiatric:         Mood and Affect: Mood normal.         Behavior: Behavior normal.         Thought Content: Thought content normal.         Judgment: Judgment normal.         Assessment:        Problem List Items Addressed This Visit     Malignant neoplasm of upper-outer quadrant of left breast in female, estrogen receptor positive - Primary          Plan:       She has a small node negative ER+, HER 2 negative tumor with low genomic score.  Endocrine therapy alone is appropriate.    I reviewed the rationale for adjuvant endocrine therapy with aromatase inhibitors, including the mechanism of action. I discussed side effects of aromatase inhibitor therapy, including hot flashes, weight gain, musculoskeletal symptoms, and bone loss. Written information for letrozole was provided.      She will need BMD.  RTC 3 M    Will check to see if F/U for her pre-op CT findings has been arranged.       Route Chart for Scheduling    Med Onc Chart Routing      Follow up with physician 3 months.   Follow up with [facility]    Labs    Imaging DXA scan   Next avail   Pharmacy appointment    Other referrals

## 2022-03-15 DIAGNOSIS — Z79.811 AROMATASE INHIBITOR USE: Primary | ICD-10-CM

## 2022-03-31 ENCOUNTER — APPOINTMENT (OUTPATIENT)
Dept: RADIOLOGY | Facility: CLINIC | Age: 67
End: 2022-03-31
Attending: INTERNAL MEDICINE
Payer: MEDICARE

## 2022-03-31 DIAGNOSIS — Z79.811 AROMATASE INHIBITOR USE: ICD-10-CM

## 2022-03-31 PROCEDURE — 77080 DXA BONE DENSITY AXIAL: CPT | Mod: TC,PO

## 2022-03-31 PROCEDURE — 77080 DXA BONE DENSITY AXIAL: CPT | Mod: 26,,, | Performed by: INTERNAL MEDICINE

## 2022-03-31 PROCEDURE — 77080 DEXA BONE DENSITY SPINE HIP: ICD-10-PCS | Mod: 26,,, | Performed by: INTERNAL MEDICINE

## 2022-04-27 ENCOUNTER — TELEPHONE (OUTPATIENT)
Dept: HEMATOLOGY/ONCOLOGY | Facility: CLINIC | Age: 67
End: 2022-04-27
Payer: MEDICARE

## 2022-05-31 NOTE — PROGRESS NOTES
Subjective:       Patient ID: Craa Sharp is a 66 y.o. female.    Chief Complaint: Malignant neoplasm of upper-outer quadrant of left breast i    HPI 66-year-old female seen in multidisciplinary breast Oncology Clinic with a recent diagnosis of left breast cancer.  She is a patient of .    She was started on letrozole. She does have a hot flahse occasionally at night.  Denies arthralgias and vaginal dryness.     Per Dr. Bean's previous note: Mammogram on October 12, 2020 demonstrated an irregular mass in the upper outer quadrant left breast measuring 1.4 cm. Ultrasound showed a 1.2 cm hypoechoic mass.    On October 25, 2021 biopsy was performed which showed infiltrating ductal carcinoma with lobular features with some associated intermediate grade DCIS (histologic grade 3, nuclear grade 2, mitotic index 1).  Tumor cells were 96% ER positive, 95% NH positive, HER2 was 1+ and Ki-67 was 9%.  Largest focus was 10 mm.    Pre surgical CT of the abdomen and pelvis on 2/3/21  showed a 9 mm left hepatic density and a 1 cm LLL pulmonary nodule.    On 2/7/22 she underwent bilateral nipple sparing mastectomy with autologous reconstruction - KOKO flaps  ([facility] - Dr. Butcher)  The pathology from that surgery showed 2 mm of residual invasive cancer in the left breast with 3 negative sentinel lymph nodes.T1bNo.  The right breast showed no significant abnormality.    Oncotype score was 6.     She has had a post -op hematoma in the left breast reconstruction which has required  needle aspiration.      Review of Systems   Constitutional: Negative for activity change, appetite change, fever and unexpected weight change.   HENT: Positive for hearing loss. Negative for mouth sores.    Eyes: Negative for visual disturbance.   Respiratory: Negative for cough and shortness of breath.    Cardiovascular: Negative for chest pain.   Gastrointestinal: Negative for abdominal pain and diarrhea.   Genitourinary: Negative.   Positive for hot flashes (tolerable, at night). Negative for frequency.   Musculoskeletal: Negative for back pain.   Integumentary:  Negative for rash.   Neurological: Negative for headaches.   Hematological: Negative for adenopathy.   Psychiatric/Behavioral: The patient is not nervous/anxious.          Objective:      Physical Exam  Vitals reviewed.   Constitutional:       General: She is not in acute distress.  HENT:      Head: Normocephalic.      Mouth/Throat:      Mouth: Mucous membranes are moist.      Pharynx: Oropharynx is clear. No oropharyngeal exudate or posterior oropharyngeal erythema.   Eyes:      General: No scleral icterus.  Cardiovascular:      Rate and Rhythm: Normal rate and regular rhythm.   Pulmonary:      Effort: Pulmonary effort is normal. No respiratory distress.      Breath sounds: Normal breath sounds. No wheezing or rales.   Chest:   Breasts:      Right: No axillary adenopathy or supraclavicular adenopathy.      Left: No axillary adenopathy or supraclavicular adenopathy.         Abdominal:      Palpations: Abdomen is soft. There is no mass.      Tenderness: There is no abdominal tenderness.   Lymphadenopathy:      Cervical: No cervical adenopathy.      Upper Body:      Right upper body: No supraclavicular or axillary adenopathy.      Left upper body: No supraclavicular or axillary adenopathy.   Skin:     Findings: No rash.   Neurological:      Mental Status: She is alert and oriented to person, place, and time.   Psychiatric:         Mood and Affect: Mood normal.         Behavior: Behavior normal.         Thought Content: Thought content normal.         Judgment: Judgment normal.         Assessment:           1. Malignant neoplasm of upper-outer quadrant of left breast in female, estrogen receptor positive     2. Hyperlipidemia, unspecified hyperlipidemia type        Plan:       1. Continue letrozole   - Normal BMD, repeat in 2 years   - No longer needs imaging due to bilateral mastectomies      2. Continue current medication and follow up with PCP          Return to clinic in 3 months with MD appointment.     Patient is in agreement with the proposed treatment plan. All questions were answered to the patient's satisfaction. Patient knows to call clinic for any new or worsening symptoms and if anything is needed before the next clinic visit.          Ewa Rivas, FNP-C  Hematology & Medical Oncology   [address] [place] [address]  ph. [phone]  Fax. [phone]    Collaborating physician, Dr. Bean.    Approximately 15 minutes were spent face-to-face with the patient.  Approximately 25 minutes in total were spent on this encounter, which includes face-to-face time and non-face-to-face time preparing to see the patient (e.g., review of tests), obtaining and/or reviewing separately obtained history, documenting clinical information in the electronic or other health record, independently interpreting results (not separately reported) and communicating results to the patient/family/caregiver, or care coordination (not separately reported).     Route Chart for Scheduling    Med Onc Chart Routing      Follow up with physician    Follow up with [facility] 3 months.   Labs    Imaging    Pharmacy appointment    Other referrals

## 2022-06-14 ENCOUNTER — OFFICE VISIT (OUTPATIENT)
Dept: HEMATOLOGY/ONCOLOGY | Facility: CLINIC | Age: 67
End: 2022-06-14
Payer: MEDICARE

## 2022-06-14 VITALS
WEIGHT: 173.06 LBS | HEART RATE: 72 BPM | SYSTOLIC BLOOD PRESSURE: 140 MMHG | DIASTOLIC BLOOD PRESSURE: 65 MMHG | BODY MASS INDEX: 27.81 KG/M2 | HEIGHT: 66 IN | OXYGEN SATURATION: 95 % | RESPIRATION RATE: 18 BRPM | TEMPERATURE: 98 F

## 2022-06-14 DIAGNOSIS — C50.412 MALIGNANT NEOPLASM OF UPPER-OUTER QUADRANT OF LEFT BREAST IN FEMALE, ESTROGEN RECEPTOR POSITIVE: Primary | ICD-10-CM

## 2022-06-14 DIAGNOSIS — Z17.0 MALIGNANT NEOPLASM OF UPPER-OUTER QUADRANT OF LEFT BREAST IN FEMALE, ESTROGEN RECEPTOR POSITIVE: Primary | ICD-10-CM

## 2022-06-14 DIAGNOSIS — E78.5 HYPERLIPIDEMIA, UNSPECIFIED HYPERLIPIDEMIA TYPE: ICD-10-CM

## 2022-06-14 PROCEDURE — 99999 PR PBB SHADOW E&M-EST. PATIENT-LVL IV: ICD-10-PCS | Mod: PBBFAC,,, | Performed by: NURSE PRACTITIONER

## 2022-06-14 PROCEDURE — 3008F PR BODY MASS INDEX (BMI) DOCUMENTED: ICD-10-PCS | Mod: CPTII,S$GLB,, | Performed by: NURSE PRACTITIONER

## 2022-06-14 PROCEDURE — 3008F BODY MASS INDEX DOCD: CPT | Mod: CPTII,S$GLB,, | Performed by: NURSE PRACTITIONER

## 2022-06-14 PROCEDURE — 1160F RVW MEDS BY RX/DR IN RCRD: CPT | Mod: CPTII,S$GLB,, | Performed by: NURSE PRACTITIONER

## 2022-06-14 PROCEDURE — 99214 PR OFFICE/OUTPT VISIT, EST, LEVL IV, 30-39 MIN: ICD-10-PCS | Mod: S$GLB,,, | Performed by: NURSE PRACTITIONER

## 2022-06-14 PROCEDURE — 1159F PR MEDICATION LIST DOCUMENTED IN MEDICAL RECORD: ICD-10-PCS | Mod: CPTII,S$GLB,, | Performed by: NURSE PRACTITIONER

## 2022-06-14 PROCEDURE — 99999 PR PBB SHADOW E&M-EST. PATIENT-LVL IV: CPT | Mod: PBBFAC,,, | Performed by: NURSE PRACTITIONER

## 2022-06-14 PROCEDURE — 1160F PR REVIEW ALL MEDS BY PRESCRIBER/CLIN PHARMACIST DOCUMENTED: ICD-10-PCS | Mod: CPTII,S$GLB,, | Performed by: NURSE PRACTITIONER

## 2022-06-14 PROCEDURE — 1159F MED LIST DOCD IN RCRD: CPT | Mod: CPTII,S$GLB,, | Performed by: NURSE PRACTITIONER

## 2022-06-14 PROCEDURE — 1101F PR PT FALLS ASSESS DOC 0-1 FALLS W/OUT INJ PAST YR: ICD-10-PCS | Mod: CPTII,S$GLB,, | Performed by: NURSE PRACTITIONER

## 2022-06-14 PROCEDURE — 3288F FALL RISK ASSESSMENT DOCD: CPT | Mod: CPTII,S$GLB,, | Performed by: NURSE PRACTITIONER

## 2022-06-14 PROCEDURE — 3077F PR MOST RECENT SYSTOLIC BLOOD PRESSURE >= 140 MM HG: ICD-10-PCS | Mod: CPTII,S$GLB,, | Performed by: NURSE PRACTITIONER

## 2022-06-14 PROCEDURE — 1126F PR PAIN SEVERITY QUANTIFIED, NO PAIN PRESENT: ICD-10-PCS | Mod: CPTII,S$GLB,, | Performed by: NURSE PRACTITIONER

## 2022-06-14 PROCEDURE — 3078F DIAST BP <80 MM HG: CPT | Mod: CPTII,S$GLB,, | Performed by: NURSE PRACTITIONER

## 2022-06-14 PROCEDURE — 1126F AMNT PAIN NOTED NONE PRSNT: CPT | Mod: CPTII,S$GLB,, | Performed by: NURSE PRACTITIONER

## 2022-06-14 PROCEDURE — 3078F PR MOST RECENT DIASTOLIC BLOOD PRESSURE < 80 MM HG: ICD-10-PCS | Mod: CPTII,S$GLB,, | Performed by: NURSE PRACTITIONER

## 2022-06-14 PROCEDURE — 3077F SYST BP >= 140 MM HG: CPT | Mod: CPTII,S$GLB,, | Performed by: NURSE PRACTITIONER

## 2022-06-14 PROCEDURE — 3288F PR FALLS RISK ASSESSMENT DOCUMENTED: ICD-10-PCS | Mod: CPTII,S$GLB,, | Performed by: NURSE PRACTITIONER

## 2022-06-14 PROCEDURE — 1101F PT FALLS ASSESS-DOCD LE1/YR: CPT | Mod: CPTII,S$GLB,, | Performed by: NURSE PRACTITIONER

## 2022-06-14 PROCEDURE — 99214 OFFICE O/P EST MOD 30 MIN: CPT | Mod: S$GLB,,, | Performed by: NURSE PRACTITIONER

## 2022-11-14 ENCOUNTER — HOSPITAL ENCOUNTER (OUTPATIENT)
Dept: RADIOLOGY | Facility: HOSPITAL | Age: 67
Discharge: HOME | End: 2022-11-14
Attending: SPECIALIST
Payer: MEDICARE

## 2022-11-14 ENCOUNTER — OFFICE VISIT (OUTPATIENT)
Dept: HEMATOLOGY/ONCOLOGY | Facility: CLINIC | Age: 67
End: 2022-11-14
Payer: MEDICARE

## 2022-11-14 VITALS
HEART RATE: 80 BPM | SYSTOLIC BLOOD PRESSURE: 156 MMHG | OXYGEN SATURATION: 97 % | WEIGHT: 175.69 LBS | DIASTOLIC BLOOD PRESSURE: 71 MMHG | TEMPERATURE: 98 F | BODY MASS INDEX: 28.23 KG/M2 | HEIGHT: 66 IN

## 2022-11-14 DIAGNOSIS — Z17.0 MALIGNANT NEOPLASM OF UPPER-OUTER QUADRANT OF LEFT BREAST IN FEMALE, ESTROGEN RECEPTOR POSITIVE: Primary | ICD-10-CM

## 2022-11-14 DIAGNOSIS — E04.1 THYROID NODULE: ICD-10-CM

## 2022-11-14 DIAGNOSIS — F51.01 PRIMARY INSOMNIA: ICD-10-CM

## 2022-11-14 DIAGNOSIS — R91.1 SOLITARY PULMONARY NODULE: ICD-10-CM

## 2022-11-14 DIAGNOSIS — C50.412 MALIGNANT NEOPLASM OF UPPER-OUTER QUADRANT OF LEFT BREAST IN FEMALE, ESTROGEN RECEPTOR POSITIVE: Primary | ICD-10-CM

## 2022-11-14 PROCEDURE — 99999 PR PBB SHADOW E&M-EST. PATIENT-LVL IV: CPT | Mod: PBBFAC,,, | Performed by: INTERNAL MEDICINE

## 2022-11-14 PROCEDURE — 3288F FALL RISK ASSESSMENT DOCD: CPT | Mod: CPTII,S$GLB,, | Performed by: INTERNAL MEDICINE

## 2022-11-14 PROCEDURE — 3077F SYST BP >= 140 MM HG: CPT | Mod: CPTII,S$GLB,, | Performed by: INTERNAL MEDICINE

## 2022-11-14 PROCEDURE — 1126F AMNT PAIN NOTED NONE PRSNT: CPT | Mod: CPTII,S$GLB,, | Performed by: INTERNAL MEDICINE

## 2022-11-14 PROCEDURE — 3077F PR MOST RECENT SYSTOLIC BLOOD PRESSURE >= 140 MM HG: ICD-10-PCS | Mod: CPTII,S$GLB,, | Performed by: INTERNAL MEDICINE

## 2022-11-14 PROCEDURE — 99214 PR OFFICE/OUTPT VISIT, EST, LEVL IV, 30-39 MIN: ICD-10-PCS | Mod: S$GLB,,, | Performed by: INTERNAL MEDICINE

## 2022-11-14 PROCEDURE — 99999 PR PBB SHADOW E&M-EST. PATIENT-LVL IV: ICD-10-PCS | Mod: PBBFAC,,, | Performed by: INTERNAL MEDICINE

## 2022-11-14 PROCEDURE — 1126F PR PAIN SEVERITY QUANTIFIED, NO PAIN PRESENT: ICD-10-PCS | Mod: CPTII,S$GLB,, | Performed by: INTERNAL MEDICINE

## 2022-11-14 PROCEDURE — 3288F PR FALLS RISK ASSESSMENT DOCUMENTED: ICD-10-PCS | Mod: CPTII,S$GLB,, | Performed by: INTERNAL MEDICINE

## 2022-11-14 PROCEDURE — 76536 US EXAM OF HEAD AND NECK: CPT | Mod: TC

## 2022-11-14 PROCEDURE — 3008F PR BODY MASS INDEX (BMI) DOCUMENTED: ICD-10-PCS | Mod: CPTII,S$GLB,, | Performed by: INTERNAL MEDICINE

## 2022-11-14 PROCEDURE — 76536 US THYROID: ICD-10-PCS | Mod: 26,,, | Performed by: INTERNAL MEDICINE

## 2022-11-14 PROCEDURE — 1159F MED LIST DOCD IN RCRD: CPT | Mod: CPTII,S$GLB,, | Performed by: INTERNAL MEDICINE

## 2022-11-14 PROCEDURE — 3078F PR MOST RECENT DIASTOLIC BLOOD PRESSURE < 80 MM HG: ICD-10-PCS | Mod: CPTII,S$GLB,, | Performed by: INTERNAL MEDICINE

## 2022-11-14 PROCEDURE — 1159F PR MEDICATION LIST DOCUMENTED IN MEDICAL RECORD: ICD-10-PCS | Mod: CPTII,S$GLB,, | Performed by: INTERNAL MEDICINE

## 2022-11-14 PROCEDURE — 76536 US EXAM OF HEAD AND NECK: CPT | Mod: 26,,, | Performed by: INTERNAL MEDICINE

## 2022-11-14 PROCEDURE — 3008F BODY MASS INDEX DOCD: CPT | Mod: CPTII,S$GLB,, | Performed by: INTERNAL MEDICINE

## 2022-11-14 PROCEDURE — 1101F PR PT FALLS ASSESS DOC 0-1 FALLS W/OUT INJ PAST YR: ICD-10-PCS | Mod: CPTII,S$GLB,, | Performed by: INTERNAL MEDICINE

## 2022-11-14 PROCEDURE — 99214 OFFICE O/P EST MOD 30 MIN: CPT | Mod: S$GLB,,, | Performed by: INTERNAL MEDICINE

## 2022-11-14 PROCEDURE — 3078F DIAST BP <80 MM HG: CPT | Mod: CPTII,S$GLB,, | Performed by: INTERNAL MEDICINE

## 2022-11-14 PROCEDURE — 1101F PT FALLS ASSESS-DOCD LE1/YR: CPT | Mod: CPTII,S$GLB,, | Performed by: INTERNAL MEDICINE

## 2022-11-14 RX ORDER — TRAZODONE HYDROCHLORIDE 100 MG/1
100 TABLET ORAL NIGHTLY
Qty: 30 TABLET | Refills: 11 | Status: SHIPPED | OUTPATIENT
Start: 2022-11-14 | End: 2023-02-14 | Stop reason: ALTCHOICE

## 2022-12-20 ENCOUNTER — CLINICAL SUPPORT (OUTPATIENT)
Dept: REHABILITATION | Facility: HOSPITAL | Age: 67
End: 2022-12-20
Attending: INTERNAL MEDICINE
Payer: MEDICARE

## 2022-12-20 DIAGNOSIS — M25.512 PAIN IN SHOULDER REGION, LEFT: ICD-10-CM

## 2022-12-20 DIAGNOSIS — M25.611 DECREASED ROM OF RIGHT SHOULDER: ICD-10-CM

## 2022-12-20 DIAGNOSIS — R29.3 POOR POSTURE: ICD-10-CM

## 2022-12-20 DIAGNOSIS — Z17.0 MALIGNANT NEOPLASM OF UPPER-OUTER QUADRANT OF LEFT BREAST IN FEMALE, ESTROGEN RECEPTOR POSITIVE: ICD-10-CM

## 2022-12-20 DIAGNOSIS — Z74.09 IMPAIRED FUNCTIONAL MOBILITY AND ACTIVITY TOLERANCE: ICD-10-CM

## 2022-12-20 DIAGNOSIS — M25.612 DECREASED ROM OF LEFT SHOULDER: ICD-10-CM

## 2022-12-20 DIAGNOSIS — C50.412 MALIGNANT NEOPLASM OF UPPER-OUTER QUADRANT OF LEFT BREAST IN FEMALE, ESTROGEN RECEPTOR POSITIVE: ICD-10-CM

## 2022-12-20 DIAGNOSIS — R53.1 DECREASED STRENGTH: ICD-10-CM

## 2022-12-20 PROCEDURE — 97110 THERAPEUTIC EXERCISES: CPT

## 2022-12-20 PROCEDURE — 97530 THERAPEUTIC ACTIVITIES: CPT

## 2022-12-20 PROCEDURE — 97162 PT EVAL MOD COMPLEX 30 MIN: CPT

## 2022-12-20 NOTE — PATIENT INSTRUCTIONS
When to call your doctor   - if any part of your affected arm or axilla feels hot, is reddened or has increased swelling   - if you develop a temperature over 101 degrees Fahrenheit      Lymphedema - Identification and Prevention     Lymphedema - is the swelling of a body area or extremity caused by the accumulation of lymphatic fluid.  There is a risk for lymphedema with the removal of lymph nodes, trauma or radiation therapy.  Treatment of breast cancer often involves surgery: mastectomy or lumpectomy. Some of the lymph nodes in the underarm (called axillary lymph nodes) may be removed and checked to see if they contain cancer cells.     During breast surgery when axillary lymph nodes are removed (with sentinel node biopsy or axillary dissection) or are treated with radiation therapy, the lymphatic system may become impaired. This may prevent lymphatic fluid from leaving the area therefore, causing lymphedema.     Lymphatic fluid is a normal part of the circulatory system. Its function is to remove waste products and to produce cells vital to fighting infection. Swelling occurs when the vessels become restricted and the lymphatic fluid is unable to freely flow through them.  If lymphedema is left untreated, the affected limb could progressively become more swollen, which could lead to hardening of the skin, bulkiness in the limb, infection and impaired wound healing.         There are things you can do to decrease the chance of developing lymphedema.                                          [email or web address]                                                                                                                                                  The information presented is intended for general information and educational purposes. It is not intended to replace the  advice of your health care provider. Contact your health care provider if you believe you have a health  problem.                                                      Scapular Retraction (Standing)    With arms at sides, squeeze shoulder blades together. Do not shrug shoulders and do not hold your breath. Hold 5 seconds. Repeat 10 times, 1 set, 3 x day.                 For the following two exercises, start with hands resting on your forehead and progress to hands behind your head.

## 2022-12-20 NOTE — PLAN OF CARE
"[facility]  Physical Therapy Initial Evaluation    Date: 12/20/2022   Name: Cara Sharp  Clinic Number: [id number]    Therapy Diagnosis:   Encounter Diagnoses   Name Primary?    Malignant neoplasm of upper-outer quadrant of left breast in female, estrogen receptor positive     Decreased ROM of left shoulder     Decreased ROM of right shoulder     Impaired functional mobility and activity tolerance     Poor posture     Decreased strength     Pain in shoulder region, left      Physician: Jose Bean MD    Physician Orders: PT Eval and Treat   Medical Diagnosis: Malignant neoplasm of upper-outer quadrant of left breast in female, estrogen receptor positive  Evaluation Date: 12/20/2022  Authorization Period Expiration: 11/14/23  Plan of Care Certification Period: 2/17/23  Visit # / Visits authorized: 1/ 1  Insurance: [employer]  FOTO: 1/3    Time In: 2:02 PM  Time Out: 3:00 PM  Total Billable Time: 58 minutes    Precautions: Standard and cancer, hearing impaired      History   History of Present Illness: Cara is a 67 y.o. female that presents to  [facility] at the [facility] clinic with history of B breast surgery. Pt was diagnosed with left breast cancer in October of 2021. Pt had a lumpectomy and SLNB on 12/3/21, which showed she had two types of cancer. She underwent B mastectomy with KOKO flap reconstruction on 2/7/22. Pt had 3 lymph nodes removed during B mastectomy   Diagnosis: Malignant neoplasm of upper-outer quadrant of left breast in female, estrogen receptor positive  Chief complaint: "When I reach up with my left arm, all this hurts" Pt indicates to her upper left arm, axilla, and chest wall. Pt states she has difficulty reaching overhead. Repetitive motion is difficult, like pulling weeds. Has to think twice when bending down to pick something up- can be uncomfortable at scar site.  Surgical History:  Cara Sharp  " has a past surgical history that includes Carpal tunnel release (Left, 1997); Tonsillectomy (1977); Colonoscopy (10/07/2020); Thyroidectomy, partial (2011); Dilation and curettage of uterus (1985); Mastectomy, partial (Left, 12/3/2021); [place] lymph node biopsy (Left, 12/3/2021); and Injection for sentinel node identification (Left, 12/3/2021).    Chemotherapy: None  Radiation: None  Targeted therapy: started Letrozole March 2022.    Past Medical History:   Diagnosis Date    H/O mammogram 11/16/2017    Birads 2/B  @ [facility]     Hearing impaired     History of bone density study 05/09/2018    Spine T-score: 0.9   //   Rt Hip T-score:  0.0   @ [facility]     Hyperlipidemia     Menopause 2009    Occult blood positive stool     Pap smear for cervical cancer screening 08/31/2015    Negative/ Hpv Neg    Relies on partner vasectomy for contraception     Thyroid disease     nodules          Medications:  Cara has a current medication list which includes the following prescription(s): atorvastatin, ergocalciferol (vitamin d2), escitalopram oxalate, levothyroxine, and trazodone, and the following Facility-Administered Medications: sodium chloride 0.9%, celecoxib, fentanyl, lidocaine (pf) 10 mg/ml (1%), and midazolam.    Allergies:  Review of patient's allergies indicates:   Allergen Reactions    Ibuprofen Hives and Shortness Of Breath    Naproxen Hives, Shortness Of Breath and Rash        Prior Therapy: yes after carpal tunnel  Social History: pt  lives with their spouse  Occupation: Retired  Prior Level of Function: no limitations  Current Level of Function: see chief complaints  Exercise routine prior to onset: pt does the treadmill at the gym, 2x/week  Hand dominance: left handed      Patient's Goals: get some relief and know what I can do at home to help my shoulder         Subjective   Pt states:   Pain: 0/10 on VAS currently.   Best: 0/10  Worst: 5/10   Pain location: left underarm, upper arm, lateral chest wall   Objective  "  Mental status :A+O x 3, pleasant, appropriate    Postural examination/scapula alignment: Rounded shoulder    Skin Integrity:   Scar Location: B breasts and abdomen  Appearance: healed  Signs of infection: No  Drainage: N/A  Color:N/A    Edema: none noted  Location: N/A    Axillary Web Syndrome/Cording:   Location: N/A  Degree of Cording: N/A (mild, moderate etc...)   Number of cords present: 0    Sensation: numbness noted in left axilla        Range of Motion:      Shoulder Range of Motion:   Active /Passive ROM Right Left   Flexion 150 145*   Abduction 170 110*   Extension TBA TBA   IR/90deg 90 90 (@45 of ABD   ER/90deg 85 65          Strength: manual muscle test grades below   Upper Extremity Strength   (R) UE (L) UE   Shoulder flexion: 5/5 5/5   Shoulder Abduction: 5/5 5/5   Shoulder IR 5/5 5/5   Shoulder ER 5/5 4/5*   Elbow flexion: 5/5 5/5   Elbow extension: 5/5 5/5   Wrist flexion: 5/5 5/5   Wrist extension: 5/5 5/5    good good   *painful in left axilla    Baseline Measurements of BL UE's for early detection of Lymphedema:     LANDMARK RIGHT UE LEFT UE DIFFERENCE   E + 8" 36 cm 36 cm 0 cm   E + 6" 35.5 cm 34 cm 1.5 cm   E + 4" 32.5 cm 32.5 cm 0 cm   E + 2" 29 cm 30 cm 1 cm   Elbow 27.5 cm 27 cm 0.5 cm   W+ 8" 27 cm 27 cm 0 cm   W +  6" 25.5 cm 26 cm 0.5 cm   W + 4" 21.5 cm 23 cm 1.5 cm   Wrist 17 cm 17.5 cm 0.5 cm   DPC 20 cm 19.5 cm 0.5 cm   IP Thumb 6.5 cm 7 cm 0.5 cm     Functional Mobility (Bed mobility, transfers)  Mod I to independent    ADL's:  Mod I to independent    Gait Assessment:   - AD used: none  - Assistance: independent  - Distance: community distances     Patient Education Provided   - role of therapy in multi - disciplinary team, goals for therapy  -HEP, POC, scheduling.      Pt has no cultural, educational or language barriers to learning provided.  Treatment and Instruction of Home Exercise Program      Total Time Separate from Evaluation: 23 minutes    Cara received individual " therapeutic exercises to improve postural correction and alignment, stretching and soft tissue mobility, and strengthening for 15 minutes including the following:     PROM to L shoulder by PT, 5-7 reps each of Flex, ER at 90, ABD  Posterior capsule stretch by PT, L side  Butterflies with hands on forehead x 8 reps  LTR with hands on forehead, 8 reps B  Shoulder flexion with 1# wand, 6 reps  Scapular retractions x 5-7 reps      Cara received the following therapeutic activity 1:1 for 8 minutes    - Pt was educated in lymphedema etiology and management plans.    - Pt was provided with written risk reductions and precautions for managing lymphedema.       Written Home Exercises Provided: yes.  Exercises were reviewed and Cara was able to demonstrate them prior to the end of the session.  Cara demonstrated good  understanding of the education provided.     See EMR under Patient Instructions for exercises provided 12/20/2022.      Functional Limitations Reporting      CMS Impairment/Limitation/Restriction for FOTO Shoulder Survey    Therapist reviewed FOTO scores for Cara Sharp on 12/20/2022.   FOTO documents entered into [employer] - see Media section.    Limitations Score: 35%  Category: Carrying           Assessment   This is a 67 y.o. female referred to outpatient physical therapy and presents with a medical diagnosis of left breast cancer and was seen today post-operatively to establish PT plan of care for impairments following surgery including: left shoulder region pain, decreased strength, poor posture, decreased ROM, decreased endurance, impaired functional mobility, and is at risk for lymphedema. She was able to complete today's exercises without issue and was given guidance on how to modify/advance them.  .     Pt instructed in HEP this session and was able to perform all exercises given independently. Pt instructed to follow up with therapist if any concerns arise with program established. Pt will continue  to benefit from skilled physical therapy to address the impairments stated in chart below, provide patient/family education and to maximize pt's level of independence in home and community environment     Pt prognosis is Good.    Anticipated barriers to physical therapy: none anticipated     Pt's spiritual, cultural and educational needs considered and pt agreeable to plan of care and goals as stated below:     Medical necessity is demonstrated by the following IMPAIRMENTS/PROMBLEM LIST:    History  Co-morbidities and personal factors that may impact the plan of care Co-morbidities:   difficulty sleeping, history of cancer, and thyroid disease    Personal Factors:   no deficits     moderate   Examination  Body Structures and Functions, activity limitations and participation restrictions that may impact the plan of care Body Regions:   upper extremities  trunk    Body Systems:    ROM  strength  posture    Participation Restrictions:   None anticipated    Activity limitations:   Learning and applying knowledge  no deficits    General Tasks and Commands  no deficits    Communication  no deficits    Mobility  lifting and carrying objects    Self care  dressing    Domestic Life  shopping  cooking  doing house work (cleaning house, washing dishes, laundry)    Interactions/Relationships  no deficits    Life Areas  no deficits    Community and Social Life  community life  recreation and leisure         high   Clinical Presentation evolving clinical presentation with changing clinical characteristics moderate   Decision Making/ Complexity Score: moderate       Goals: Pt agrees with goals set    Short Term goals: 4 weeks  1. Patient will demonstrate 100% understanding of lymphedema risk reduction practices to include self monitoring for lymphedema. (progressing, not met)  2. Patient will demonstrate independence with Home Exercise program established. (progressing, not met)  3. Pt will increase AROM/PROM in shoulder abduction  ROM to >/=170 degrees on left to improve functional reach, carry, push, pull pain free. (progressing, not met)  4. Pt will increase AROM/PROM in shoulder flexion to >/=160 degrees bilaterally to improve functional reach, carry, push, pull pain free.(progressing, not met)  5. Pt will demo 5/5 strength in LUE for improved functional mobility, endurance, and posture. (progressing, not met)      Long Term Goals: 8 weeks   1.  Pt will increase AROM/PROM in shoulder flexion to >/=170 degrees bilaterally to improve functional reach, carry, push, pull pain free. (progressing, not met)  2. Pt will report being able to bend down to  items from the floor without pain/issue for improved functional mobility. (progressing, not met)  3. Pt will demonstrate full/maximized tissue mobility to increase ROM and promote healthy tissue to be pain free at discharge. (progressing, not met)  4. Pt will report decrease in overall worst pain to 2/10 at discharge. (progressing, not met)  5. Pt will increase AROM/PROM in shoulder ER at 90 ROM to >/=90 degrees on left to improve functional reach, carry, push, pull pain free. (progressing, not met)  6. Patient will report compliance with walking program 5x week for 30min each day to improve overall cardiovascular function and decrease cancer related fatigue at discharge. (progressing, not met)      Plan   Outpatient physical therapy 2x week for 8 weeks to include the following:   Manual Therapy, Neuromuscular Re-ed, Orthotic Management and Training, Patient Education, Self Care, Therapeutic Activities, and Therapeutic Exercise.    Plan of care Certification Period: 12/20/2022 to 2/17/23.    Therapist: Sherrie Paris, PT  12/20/2022

## 2022-12-27 ENCOUNTER — CLINICAL SUPPORT (OUTPATIENT)
Dept: REHABILITATION | Facility: HOSPITAL | Age: 67
End: 2022-12-27
Payer: MEDICARE

## 2022-12-27 DIAGNOSIS — M25.512 PAIN IN SHOULDER REGION, LEFT: ICD-10-CM

## 2022-12-27 DIAGNOSIS — Z74.09 IMPAIRED FUNCTIONAL MOBILITY AND ACTIVITY TOLERANCE: ICD-10-CM

## 2022-12-27 DIAGNOSIS — R29.3 POOR POSTURE: ICD-10-CM

## 2022-12-27 DIAGNOSIS — M25.612 DECREASED ROM OF LEFT SHOULDER: Primary | ICD-10-CM

## 2022-12-27 DIAGNOSIS — R53.1 DECREASED STRENGTH: ICD-10-CM

## 2022-12-27 DIAGNOSIS — M25.611 DECREASED ROM OF RIGHT SHOULDER: ICD-10-CM

## 2022-12-27 PROCEDURE — 97140 MANUAL THERAPY 1/> REGIONS: CPT

## 2022-12-27 PROCEDURE — 97110 THERAPEUTIC EXERCISES: CPT

## 2022-12-27 NOTE — PROGRESS NOTES
Physical Therapy Daily Treatment Note       Date: 12/27/2022   Name: Cara Sharp  Clinic Number: [id number]    Therapy Diagnosis:   Encounter Diagnoses   Name Primary?    Decreased ROM of left shoulder Yes    Decreased ROM of right shoulder     Impaired functional mobility and activity tolerance     Poor posture     Decreased strength     Pain in shoulder region, left      Physician: Jose Bean MD    Physician Orders: PT Eval and Treat   Medical Diagnosis: Malignant neoplasm of upper-outer quadrant of left breast in female, estrogen receptor positive  Evaluation Date: 12/20/2022  Authorization Period Expiration: 12/31/22  Plan of Care Certification Period: 2/17/23  Visit # / Visits authorized: 1/ 2 (plus evaluation)  Insurance: [employer]  FOTO: 1/3     Time In: 3:01 PM  Time Out: 4:00 PM  Total Billable Time: 59 minutes     Precautions: Standard and cancer, hearing impaired      Subjective     Pt reports: Feels like the exercises have been helping. Pt still feels weak and tight in her abdomen  She was compliant with home exercise program.  Response to previous treatment: no adverse reaction  Pain Scale: Cara rates pain on a scale of 0/10 on VAS.   Pain location: N/A     Fatigue: none reported   Functional change: can reach ornaments more easily  Treatment:   Chemotherapy: None  Radiation: None  Targeted therapy: started Letrozole March 2022.    Surgery date:  Pt was diagnosed with left breast cancer in October of 2021. Pt had a lumpectomy and SLNB on 12/3/21, which showed she had two types of cancer. She underwent B mastectomy with KOKO flap reconstruction on 2/7/22. Pt had 3 lymph nodes removed during B mastectomy       Objective     Objective Measures updated at progress report unless specified.     Shoulder Range of Motion:   Active /Passive ROM Right Left   Flexion 150 150*   Abduction 170 165*   Extension 52 50   IR/90deg 90 70   ER/90deg  85 90        Treatment     Cara received individual therapeutic exercises to improve postural correction and alignment, stretching and soft tissue mobility, and strengthening for 40 minutes including the following:     Measurements taken above    Pulleys, 2 min each of flexion and scaption  Pec stretch over towel roll x 2 min  PROM to L shoulder by PT, 10  reps each of Flex, ER at 90, ABD  Butterflies with hands on forehead x 10 reps  LTR with hands on forehead, 10 reps B  Core activation with orange swiss ball, 12 reps, 3s holds  Supine marching, 10 reps B  Posterior pelvic tilt, 10-12 reps  Bridging with hip add squeeze with yellow ball, 10 reps  Shoulder flexion with 1# wand, 10 reps  B shoulder ER at 90 with 1# wand, 10 reps  Shoulder flexion wall slide x 10 reps  Shoulder Abduction wall slide, 10 reps each UE      Cara received the following manual therapy techniques were performed to increased myofascial/soft tissue length, mobility and pliability, increase PROM, AROM and function as well as to decrease pain for 19 minutes    STM to L upper trap  Grade I joint mobs inferior and anterior-posterior to L shoulder  Posterior capsule stretch by PT, L side  Komprex II padding in stockinette provided for lateral left breast. Pt educated on purpose, wearing schedule to tolerance, and how to wash    Home Exercise Program and Patient Education   Education provided re:  - role of PT in multi - disciplinary team, goals for PT  - progress towards goals   - use of towel roll for pec stretch and postural awareness  -exercise technique, potential benefit of swell spot    Written Home Exercises Provided: yes.  Exercises were reviewed and Cara was able to demonstrate them prior to the end of the session.  Cara demonstrated good  understanding of the education provided.     See EMR under Patient Instructions for exercises provided 12/27/2022 and Evaluation    Pt has no cultural, educational or language barriers to learning  provided.    Assessment     Patient is responding well to physical therapy. She demo's improved left shoulder ROM and has met goals as noted below. Pt was able to toelrate exercises without issue in clinic. Will gauge response at next session. She was provided with Komprex II padding to assist with pain management/? swelling at left lateral breast. Will follow up at next session.     Pt prognosis is Good. Pt will continue to benefit from skilled outpatient physical therapy to address the deficits listed in the problem list chart on initial evaluation, provide pt/family education and to maximize pt's level of independence in the home and community environment.     Anticipated barriers to physical therapy: none anticipated    Goals as follows:  Short Term goals: 4 weeks  1. Patient will demonstrate 100% understanding of lymphedema risk reduction practices to include self monitoring for lymphedema. (progressing, not met)  2. Patient will demonstrate independence with Home Exercise program established. (progressing, not met)  3. Pt will increase AROM/PROM in shoulder abduction ROM to >/=170 degrees on left to improve functional reach, carry, push, pull pain free. (progressing, not met)  4. Pt will increase AROM/PROM in shoulder flexion to >/=160 degrees bilaterally to improve functional reach, carry, push, pull pain free.(progressing, not met)  5. Pt will demo 5/5 strength in LUE for improved functional mobility, endurance, and posture. (progressing, not met)        Long Term Goals: 8 weeks   1.  Pt will increase AROM/PROM in shoulder flexion to >/=170 degrees bilaterally to improve functional reach, carry, push, pull pain free. (progressing, not met)  2. Pt will report being able to bend down to  items from the floor without pain/issue for improved functional mobility. (progressing, not met)  3. Pt will demonstrate full/maximized tissue mobility to increase ROM and promote healthy tissue to be pain free at  discharge. (progressing, not met)  4. Pt will report decrease in overall worst pain to 2/10 at discharge. (progressing, not met)  5. Pt will increase AROM/PROM in shoulder ER at 90 ROM to >/=90 degrees on left to improve functional reach, carry, push, pull pain free. (Met, 21/17/22)  6. Patient will report compliance with walking program 5x week for 30min each day to improve overall cardiovascular function and decrease cancer related fatigue at discharge. (progressing, not met)        Plan   Outpatient physical therapy 2x week for 8 weeks to include the following:   Manual Therapy, Neuromuscular Re-ed, Orthotic Management and Training, Patient Education, Self Care, Therapeutic Activities, and Therapeutic Exercise.     Plan of care Certification Period: 12/20/2022 to 2/17/23.    Therapist: Sherrie Paris, PT  12/27/2022       Otolaryngologist Procedure Text (A): After obtaining clear surgical margins the patient was sent to otolaryngology for surgical repair.  The patient understands they will receive post-surgical care and follow-up from the referring physician's office.

## 2023-01-03 ENCOUNTER — CLINICAL SUPPORT (OUTPATIENT)
Dept: REHABILITATION | Facility: HOSPITAL | Age: 68
End: 2023-01-03
Payer: MEDICARE

## 2023-01-03 DIAGNOSIS — M25.512 PAIN IN SHOULDER REGION, LEFT: ICD-10-CM

## 2023-01-03 DIAGNOSIS — R29.3 POOR POSTURE: ICD-10-CM

## 2023-01-03 DIAGNOSIS — Z74.09 IMPAIRED FUNCTIONAL MOBILITY AND ACTIVITY TOLERANCE: ICD-10-CM

## 2023-01-03 DIAGNOSIS — R53.1 DECREASED STRENGTH: ICD-10-CM

## 2023-01-03 DIAGNOSIS — M25.611 DECREASED ROM OF RIGHT SHOULDER: ICD-10-CM

## 2023-01-03 DIAGNOSIS — M25.612 DECREASED ROM OF LEFT SHOULDER: Primary | ICD-10-CM

## 2023-01-03 PROCEDURE — 97110 THERAPEUTIC EXERCISES: CPT

## 2023-01-03 PROCEDURE — 97140 MANUAL THERAPY 1/> REGIONS: CPT

## 2023-01-03 NOTE — PROGRESS NOTES
"                                                    Physical Therapy Daily Treatment Note     Date: 1/3/2023   Name: Cara Sharp  Clinic Number: [id number]    Therapy Diagnosis:   Encounter Diagnoses   Name Primary?    Decreased ROM of left shoulder Yes    Decreased ROM of right shoulder     Impaired functional mobility and activity tolerance     Poor posture     Decreased strength     Pain in shoulder region, left        Physician: Jose Bean MD    Physician Orders: PT Eval and Treat   Medical Diagnosis: Malignant neoplasm of upper-outer quadrant of left breast in female, estrogen receptor positive  Evaluation Date: 12/20/2022  Authorization Period Expiration: 12/31/22  Plan of Care Certification Period: 2/17/23  Visit # / Visits authorized: 2 / 12 (plus evaluation)  Insurance: [employer]  FOTO: 1/3     Time In: 2:02 PM  Time Out: 3:00 PM  Total Billable Time: 58 minutes     Precautions: Standard and cancer, hearing impaired      Subjective     Pt reports: much improved and "more productive" at home with addition of Komprex padding provided during previous visit. Patient reports walking more, and abdomen "doing better." Patient reports owning 3# and 5# dumbbells and requested additional arm exercises to perform with weights.     She was compliant with home exercise program.  Response to previous treatment: no adverse reaction  Pain Scale: Cara rates pain on a scale of 1/10 on VAS.   Pain location: L breast / chest wall    Fatigue: none reported   Functional change: can reach ornaments more easily    Treatment:   Chemotherapy: None  Radiation: None  Targeted therapy: started Letrozole March 2022.    Surgery date:  Pt was diagnosed with left breast cancer in October of 2021. Pt had a lumpectomy and SLNB on 12/3/21, which showed she had two types of cancer. She underwent B mastectomy with KOKO flap reconstruction on 2/7/22. Pt had 3 lymph nodes removed during B mastectomy       Objective "     Objective Measures updated at progress report unless specified.     Treatment     Cara received individual therapeutic exercises to improve postural correction and alignment, stretching and soft tissue mobility, and strengthening for 48 minutes including the following:     Seated Exercises  - Pulleys, flex / scap (held 01/03/23) 2 min each  - Bicep curls, 3#    1 set x 10 reps    Mat Exercises:   - Butterflies with hands on forehead   10 reps  - LTR with hands on forehead  10 reps  - Core activation with orange swiss ball 12 reps, 3s holds  - Supine marching    10 reps B  - Posterior pelvic tilt    10-12 reps  - Bridging with hip add with yellow ball 10 reps  - Shoulder flexion with 1# wand  10 reps  - B shoulder ER at 90 with 1# wand   10 reps  - Pec stretch over towel roll   2 min  - PROM to L shoulder flex, abd, ER  10 reps each     Standing Exercises:   - Shoulder flexion wall slide    1 set x 10 reps  - Shoulder Abduction wall slide  1 set x 10 reps each   - Triceps extensions, 3#   1 set x 10 reps      Cara received the following manual therapy techniques were performed to increased myofascial/soft tissue length, mobility and pliability, increase PROM, AROM and function as well as to decrease pain for 10 minutes    - Suboccipital release  - Soft tissue mobilization / release techniques to L upper trap  - Posterior capsule stretch by PT, L side    Home Exercise Program and Patient Education   Education provided re:  - role of PT in multi - disciplinary team, goals for PT  - progress towards goals   - use of towel roll for pec stretch and postural awareness  -exercise technique, potential benefit of swell spot  -Komprex II foam padding listing on [employer]    Written Home Exercises Provided: yes. Exercises were reviewed and Cara was able to demonstrate them prior to the end of the session.  Cara demonstrated good  understanding of the education provided.     See EMR under Patient Instructions for exercises  provided 12/27/2022, Evaluation, 01/03/2023    Patient has no cultural, educational or language barriers to learning provided.    Assessment     Patient is responding well to physical therapy. Improved overall AAROM with wall slides and wand exercises, but still limitations with active range of motion against gravity. Improved strength demonstrated with added bicep curls and triceps extensions with 3# dumbbells. Improved soft tissue mobility of L upper trapezius and suboccipital muscles with manual techniques. Will continue to progress as tolerated.      Pt prognosis is Good. Patient will continue to benefit from skilled outpatient physical therapy to address the deficits listed in the problem list chart on initial evaluation, provide pt/family education and to maximize pt's level of independence in the home and community environment.     Anticipated barriers to physical therapy: none anticipated    Goals as follows:  Short Term goals: 4 weeks  1. Patient will demonstrate 100% understanding of lymphedema risk reduction practices to include self monitoring for lymphedema. (progressing, not met)  2. Patient will demonstrate independence with Home Exercise program established. (progressing, not met)  3. Pt will increase AROM/PROM in shoulder abduction ROM to >/=170 degrees on left to improve functional reach, carry, push, pull pain free. (progressing, not met)  4. Pt will increase AROM/PROM in shoulder flexion to >/=160 degrees bilaterally to improve functional reach, carry, push, pull pain free.(progressing, not met)  5. Pt will demo 5/5 strength in LUE for improved functional mobility, endurance, and posture. (progressing, not met)        Long Term Goals: 8 weeks   1.  Pt will increase AROM/PROM in shoulder flexion to >/=170 degrees bilaterally to improve functional reach, carry, push, pull pain free. (progressing, not met)  2. Pt will report being able to bend down to  items from the floor without pain/issue  for improved functional mobility. (progressing, not met)  3. Pt will demonstrate full/maximized tissue mobility to increase ROM and promote healthy tissue to be pain free at discharge. (progressing, not met)  4. Pt will report decrease in overall worst pain to 2/10 at discharge. (progressing, not met)  5. Pt will increase AROM/PROM in shoulder ER at 90 ROM to >/=90 degrees on left to improve functional reach, carry, push, pull pain free. (Met, 21/17/22)  6. Patient will report compliance with walking program 5x week for 30min each day to improve overall cardiovascular function and decrease cancer related fatigue at discharge. (progressing, not met)        Plan   Outpatient physical therapy 2x week for 8 weeks to include the following:   Manual Therapy, Neuromuscular Re-ed, Orthotic Management and Training, Patient Education, Self Care, Therapeutic Activities, and Therapeutic Exercise.     Plan of care Certification Period: 12/20/2022 to 2/17/23.    Therapist: Magalis Nayak, PT  1/3/2023

## 2023-01-10 ENCOUNTER — CLINICAL SUPPORT (OUTPATIENT)
Dept: REHABILITATION | Facility: HOSPITAL | Age: 68
End: 2023-01-10
Payer: MEDICARE

## 2023-01-10 DIAGNOSIS — M25.611 DECREASED ROM OF RIGHT SHOULDER: ICD-10-CM

## 2023-01-10 DIAGNOSIS — R29.3 POOR POSTURE: ICD-10-CM

## 2023-01-10 DIAGNOSIS — R53.1 DECREASED STRENGTH: ICD-10-CM

## 2023-01-10 DIAGNOSIS — Z74.09 IMPAIRED FUNCTIONAL MOBILITY AND ACTIVITY TOLERANCE: ICD-10-CM

## 2023-01-10 DIAGNOSIS — M25.512 PAIN IN SHOULDER REGION, LEFT: ICD-10-CM

## 2023-01-10 DIAGNOSIS — M25.612 DECREASED ROM OF LEFT SHOULDER: Primary | ICD-10-CM

## 2023-01-10 PROCEDURE — 97140 MANUAL THERAPY 1/> REGIONS: CPT

## 2023-01-10 PROCEDURE — 97110 THERAPEUTIC EXERCISES: CPT

## 2023-01-10 NOTE — PROGRESS NOTES
Physical Therapy Daily Treatment Note     Date: 1/10/2023   Name: Cara Sharp  Clinic Number: [id number]    Therapy Diagnosis:   Encounter Diagnoses   Name Primary?    Decreased ROM of left shoulder Yes    Decreased ROM of right shoulder     Impaired functional mobility and activity tolerance     Poor posture     Decreased strength     Pain in shoulder region, left        Physician: Jose Bean MD    Physician Orders: PT Eval and Treat   Medical Diagnosis: Malignant neoplasm of upper-outer quadrant of left breast in female, estrogen receptor positive  Evaluation Date: 12/20/2022  Authorization Period Expiration: 12/31/22  Plan of Care Certification Period: 2/17/23  Visit # / Visits authorized: 3 / 12 (plus evaluation)  Insurance: [employer]  FOTO: 1/3     Time In: 3:01 PM  Time Out: 4:00 PM  Total Billable Time: 59 minutes     Precautions: Standard and cancer, hearing impaired      Subjective     Patient reports: sharp pain in L breast with repetitive forward reaching. Patient used extra pad in bra in addition to Komprex pad with good relief and has not experienced reoccurrence of symptoms.       She was compliant with home exercise program.  Response to previous treatment: no adverse reaction  Pain Scale: Cara rates pain on a scale of 1/10 on VAS.   Pain location: L breast / chest wall    Fatigue: none reported   Functional change: can reach overhead more easily    Treatment:   Chemotherapy: none  Radiation: none  Targeted therapy: started Letrozole March 2022.    Surgery date:  Pt was diagnosed with left breast cancer in October of 2021. Pt had a lumpectomy and SLNB on 12/3/21, which showed she had two types of cancer. She underwent B mastectomy with KOKO flap reconstruction on 2/7/22. Pt had 3 lymph nodes removed during B mastectomy.      Objective     Objective Measures updated at progress report unless specified.     Treatment     Cara  received individual therapeutic exercises to improve postural correction and alignment, stretching and soft tissue mobility, and strengthening for 49 minutes including the following:     Seated Exercises  - Pulleys, flex / scap    2 min each  - Bicep curls, 2#    1 set x 10 reps  - Scapular retractions, yellow band  2 set x 10 reps    Mat Exercises:   - Butterfly stretch with LTR (hands behind head) 10 reps  - Core activation with orange swiss ball  5 sec x 2 min  - Supine marching with abdominal brace  10 reps (jese)  - Bridging with red theraband around knees  1 set x 10 reps  - Shoulder flexion with 1# wand   10 reps  - Shoulder ER at 90 with 1# wand    10 reps (jese)  - PROM to L shoulder flex, abd, ER   10 reps each     Standing Exercises:   - Shoulder flexion wall slide    1 set x 10 reps  - Shoulder abduction wall slide  1 set x 10 reps (jese)   - Doorway pec stretch    30 sec x 3 reps      Cara received the following manual therapy techniques were performed to increased myofascial/soft tissue length, mobility and pliability, increase PROM, AROM and function as well as to decrease pain for 10 minutes    - Soft tissue mobilization / release techniques to L upper trap  - Posterior capsule stretch by PT, L side    Home Exercise Program and Patient Education     Education provided regarding:  - Role of PT in multi - disciplinary team, goals for PT  - Progress towards goals   - Use of towel roll for pec stretch and postural awareness  - Exercise technique, potential benefit of swell spot  - Komprex II foam padding listing on [employer]  - Nerve pain symptoms and etiology    Written Home Exercises Provided: yes. Exercises were reviewed and Cara was able to demonstrate them prior to the end of the session.  Cara demonstrated good  understanding of the education provided.     See EMR under Patient Instructions for exercises provided 12/27/2022, Evaluation, 01/03/2023, and 01/10/2023.     Patient has no cultural,  "educational or language barriers to learning provided.    Assessment     Patient is responding well to physical therapy. Continued improvement with active and active-assist range of motion demonstrated during overhead exercises. Improved strength demonstrated with added scapular retractions; verbal, tactile cues required to avoid upper trapezius compensation (mod carryover). Improved flexibility with added doorway pec stretch. Improved soft tissue mobility of L upper trapezius with manual techniques. Discussed symptoms and etiology of nerve pain contributing to "sharp" pain with forward reaching activity. Advised patient to monitor symptoms and alert therapist, providers if symptoms worsen. Will continue to progress as tolerated.    Patient prognosis is Good. Patient will continue to benefit from skilled outpatient physical therapy to address the deficits listed in the problem list chart on initial evaluation, provide patient / family education and to maximize patient's level of independence in the home and community environment.     Anticipated barriers to physical therapy: none anticipated    Goals as follows:  Short Term goals: 4 weeks  1. Patient will demonstrate 100% understanding of lymphedema risk reduction practices to include self monitoring for lymphedema. (progressing, not met)  2. Patient will demonstrate independence with Home Exercise program established. (progressing, not met)  3. Pt will increase AROM/PROM in shoulder abduction ROM to >/=170 degrees on left to improve functional reach, carry, push, pull pain free. (progressing, not met)  4. Pt will increase AROM/PROM in shoulder flexion to >/=160 degrees bilaterally to improve functional reach, carry, push, pull pain free.(progressing, not met)  5. Pt will demo 5/5 strength in LUE for improved functional mobility, endurance, and posture. (progressing, not met)        Long Term Goals: 8 weeks   1.  Pt will increase AROM/PROM in shoulder flexion to " >/=170 degrees bilaterally to improve functional reach, carry, push, pull pain free. (progressing, not met)  2. Pt will report being able to bend down to  items from the floor without pain/issue for improved functional mobility. (progressing, not met)  3. Pt will demonstrate full/maximized tissue mobility to increase ROM and promote healthy tissue to be pain free at discharge. (progressing, not met)  4. Pt will report decrease in overall worst pain to 2/10 at discharge. (progressing, not met)  5. Pt will increase AROM/PROM in shoulder ER at 90 ROM to >/=90 degrees on left to improve functional reach, carry, push, pull pain free. (Met, 21/17/22)  6. Patient will report compliance with walking program 5x week for 30min each day to improve overall cardiovascular function and decrease cancer related fatigue at discharge. (progressing, not met)        Plan   Outpatient physical therapy 2x week for 8 weeks to include the following: Manual Therapy, Neuromuscular Re-ed, Orthotic Management and Training, Patient Education, Self Care, Therapeutic Activities, and Therapeutic Exercise.     Plan of care Certification Period: 12/20/2022 to 2/17/23.    Therapist: Magalis Nayak, PT  1/10/2023

## 2023-01-17 ENCOUNTER — CLINICAL SUPPORT (OUTPATIENT)
Dept: REHABILITATION | Facility: HOSPITAL | Age: 68
End: 2023-01-17
Payer: MEDICARE

## 2023-01-17 DIAGNOSIS — R53.1 DECREASED STRENGTH: ICD-10-CM

## 2023-01-17 DIAGNOSIS — M25.611 DECREASED ROM OF RIGHT SHOULDER: ICD-10-CM

## 2023-01-17 DIAGNOSIS — R29.3 POOR POSTURE: ICD-10-CM

## 2023-01-17 DIAGNOSIS — M25.512 PAIN IN SHOULDER REGION, LEFT: ICD-10-CM

## 2023-01-17 DIAGNOSIS — Z74.09 IMPAIRED FUNCTIONAL MOBILITY AND ACTIVITY TOLERANCE: ICD-10-CM

## 2023-01-17 DIAGNOSIS — M25.612 DECREASED ROM OF LEFT SHOULDER: Primary | ICD-10-CM

## 2023-01-17 PROCEDURE — 97140 MANUAL THERAPY 1/> REGIONS: CPT

## 2023-01-17 PROCEDURE — 97110 THERAPEUTIC EXERCISES: CPT

## 2023-01-17 NOTE — PROGRESS NOTES
Physical Therapy Daily Treatment Note     Date: 1/17/2023   Name: Cara Sharp  Clinic Number: [id number]    Therapy Diagnosis:   Encounter Diagnoses   Name Primary?    Decreased ROM of left shoulder Yes    Decreased ROM of right shoulder     Impaired functional mobility and activity tolerance     Poor posture     Decreased strength     Pain in shoulder region, left        Physician: Jose Bean MD    Physician Orders: PT Eval and Treat   Medical Diagnosis: Malignant neoplasm of upper-outer quadrant of left breast in female, estrogen receptor positive  Evaluation Date: 12/20/2022  Authorization Period Expiration: 12/31/22  Plan of Care Certification Period: 2/17/23  Visit # / Visits authorized: 4 / 12 (plus evaluation)  Insurance: [employer]  FOTO: 1/3     Time In: 2:01 PM  Time Out: 3:01 PM  Total Billable Time: 60 minutes     Precautions: Standard and cancer, hearing impaired      Subjective     Patient reports: Denies sharp pain since last session. Pt has been able to do more reaching and cleaning. Pt states she still feels tight in left underarm and pec. Pt states she has purchased sleeves to protect her skin during gardening and she got over the door pulleys for home. She asks what she should do if she gets a cut or scrape.    She was compliant with home exercise program.  Response to previous treatment: no adverse reaction  Pain Scale: Cara rates pain on a scale of 1/10 on VAS.   Pain location: L breast / chest wall    Fatigue: none reported   Functional change: can reach overhead more easily    Treatment:   Chemotherapy: none  Radiation: none  Targeted therapy: started Letrozole March 2022.    Surgery date:  Pt was diagnosed with left breast cancer in October of 2021. Pt had a lumpectomy and SLNB on 12/3/21, which showed she had two types of cancer. She underwent B mastectomy with KOKO flap reconstruction on 2/7/22. Pt had 3 lymph nodes  removed during B mastectomy.      Objective     Objective Measures updated at progress report unless specified.     Shoulder Range of Motion: (1/17/23)  Active /Passive ROM Right Left   Flexion 165 160*   Abduction 180 145*   Extension TBA TBA   IR/90deg 90 90    ER/90deg 90 90    *pulling    Treatment     Cara received individual therapeutic exercises to improve postural correction and alignment, stretching and soft tissue mobility, and strengthening for 50 minutes including the following:     Seated Exercises  - Pulleys, flex / scap    2 min each  - UBE, level 1     2 min back  - Bicep curls, 2#    1 set x 10 reps  - Scapular retractions, yellow band  2 set x 10 reps    Mat Exercises:   - pec stretch over towel roll, 2 min  -B shoulder external rotation over towel roll, yellow band, 2 x 10  -B horizontal ABD over towel roll, yellow band, 10 reps  - PROM to L shoulder flex, abd, ER at 90 and 45 10 reps each  - Butterfly stretch with LTR (hands behind head) 10 reps B  -side-lying open book, bilateral   10 reps each  -side-lying shoulder ABD, bilateral   10 reps each  - Core activation with orange swiss ball  5 sec x 2 min  - Supine marching while squeezing yellow ball in hands 10 reps (jese)  -Hip ADD squeeze with yellow ball   2 x 10 reps  - Bridging with red theraband around knees  2 x 10 reps  - Hip flexor stretch off mat, 2 x 20/side        Cara received the following manual therapy techniques were performed to increased myofascial/soft tissue length, mobility and pliability, increase PROM, AROM and function as well as to decrease pain for 10 minutes    - Soft tissue mobilization / release techniques to L upper trap  - Posterior capsule stretch by PT, L side  - Prolonged pec stretch by PT, L side    Home Exercise Program and Patient Education     Education provided regarding:  - Role of PT in multi - disciplinary team, goals for PT  - Progress towards goals   - Exercise technique  -reviewed lymph  precautions.    Written Home Exercises Provided: yes. Exercises were reviewed and Cara was able to demonstrate them prior to the end of the session.  Cara demonstrated good  understanding of the education provided.     See EMR under Patient Instructions for exercises provided 12/27/2022, Evaluation, 01/03/2023, and 01/10/2023.     Patient has no cultural, educational or language barriers to learning provided.    Assessment     Patient is responding well to physical therapy. Pt willie's improved B UE AROM and she has met goals as noted below. She was able to progress her exercises without issue in clinic. Improved soft tissue mobility of L upper trapezius with manual techniques. Denies sharp pain since last session Will continue to progress as tolerated.    Patient prognosis is Good. Patient will continue to benefit from skilled outpatient physical therapy to address the deficits listed in the problem list chart on initial evaluation, provide patient / family education and to maximize patient's level of independence in the home and community environment.     Anticipated barriers to physical therapy: none anticipated    Goals as follows:  Short Term goals: 4 weeks  1. Patient will demonstrate 100% understanding of lymphedema risk reduction practices to include self monitoring for lymphedema. (progressing, not met)  2. Patient will demonstrate independence with Home Exercise program established. (progressing, not met)  3. Pt will increase AROM/PROM in shoulder abduction ROM to >/=170 degrees on left to improve functional reach, carry, push, pull pain free. (progressing, not met)  4. Pt will increase AROM/PROM in shoulder flexion to >/=160 degrees bilaterally to improve functional reach, carry, push, pull pain free.(Met, 1/17/23)  5. Pt will demo 5/5 strength in LUE for improved functional mobility, endurance, and posture. (progressing, not met)        Long Term Goals: 8 weeks   1.  Pt will increase AROM/PROM in  shoulder flexion to >/=170 degrees bilaterally to improve functional reach, carry, push, pull pain free. (progressing, not met)  2. Pt will report being able to bend down to  items from the floor without pain/issue for improved functional mobility. (progressing, not met)  3. Pt will demonstrate full/maximized tissue mobility to increase ROM and promote healthy tissue to be pain free at discharge. (progressing, not met)  4. Pt will report decrease in overall worst pain to 2/10 at discharge. (progressing, not met)  5. Pt will increase AROM/PROM in shoulder ER at 90 ROM to >/=90 degrees on left to improve functional reach, carry, push, pull pain free. (Met, 1/17/22)  6. Patient will report compliance with walking program 5x week for 30min each day to improve overall cardiovascular function and decrease cancer related fatigue at discharge. (progressing, not met)        Plan   Outpatient physical therapy 2x week for 8 weeks to include the following: Manual Therapy, Neuromuscular Re-ed, Orthotic Management and Training, Patient Education, Self Care, Therapeutic Activities, and Therapeutic Exercise.     Plan of care Certification Period: 12/20/2022 to 2/17/23.    Therapist: Sherrie Paris, PT  1/17/2023

## 2023-01-24 ENCOUNTER — CLINICAL SUPPORT (OUTPATIENT)
Dept: REHABILITATION | Facility: HOSPITAL | Age: 68
End: 2023-01-24
Payer: MEDICARE

## 2023-01-24 DIAGNOSIS — R53.1 DECREASED STRENGTH: ICD-10-CM

## 2023-01-24 DIAGNOSIS — M25.611 DECREASED ROM OF RIGHT SHOULDER: ICD-10-CM

## 2023-01-24 DIAGNOSIS — Z74.09 IMPAIRED FUNCTIONAL MOBILITY AND ACTIVITY TOLERANCE: ICD-10-CM

## 2023-01-24 DIAGNOSIS — M25.612 DECREASED ROM OF LEFT SHOULDER: Primary | ICD-10-CM

## 2023-01-24 DIAGNOSIS — M25.512 PAIN IN SHOULDER REGION, LEFT: ICD-10-CM

## 2023-01-24 DIAGNOSIS — R29.3 POOR POSTURE: ICD-10-CM

## 2023-01-24 PROCEDURE — 97110 THERAPEUTIC EXERCISES: CPT

## 2023-01-24 PROCEDURE — 97140 MANUAL THERAPY 1/> REGIONS: CPT

## 2023-01-24 NOTE — PROGRESS NOTES
Physical Therapy Daily Treatment Note     Date: 1/24/2023   Name: Cara Sharp  Clinic Number: [id number]    Therapy Diagnosis:   Encounter Diagnoses   Name Primary?    Decreased ROM of left shoulder Yes    Decreased ROM of right shoulder     Impaired functional mobility and activity tolerance     Poor posture     Decreased strength     Pain in shoulder region, left        Physician: Jose Bean MD    Physician Orders: PT Eval and Treat   Medical Diagnosis: Malignant neoplasm of upper-outer quadrant of left breast in female, estrogen receptor positive  Evaluation Date: 12/20/2022  Authorization Period Expiration: 12/31/22  Plan of Care Certification Period: 2/17/23  Visit # / Visits authorized: 5 / 12 (plus evaluation)  Insurance: [employer]  FOTO: 1/3     Time In: 2:01 PM  Time Out: 2:58 PM  Total Billable Time: 57 minutes     Precautions: Standard and cancer, hearing impaired      Subjective     Patient reports: one episode of sharp pain under left breast this week after performing household chores. Patient continuing to wear [employer] foam padding with good relief. Patient reported pricking left index finger with sewing needle, but patient reported cleaning finger with soap / water.     She was compliant with home exercise program. Patient received purchased overhead pulleys and has been stretching at home.     Response to Previous Treatment: no adverse reaction  Pain Scale: Cara rates pain on a scale of 0/10 on VAS.   Pain location: N/A    Fatigue: mild  Functional change: less difficulty with general household chores    Treatment:   Chemotherapy: none  Radiation: none  Targeted therapy: started Letrozole March 2022.    Surgery Date: Patient was diagnosed with left breast cancer in October of 2021. Pt had a lumpectomy and SLNB on 12/3/21, which showed she had two types of cancer. She underwent B mastectomy with KOKO flap reconstruction on 2/7/22.  Pt had 3 lymph nodes removed during B mastectomy.      Objective     Objective Measures updated at progress report unless specified.     Shoulder Range of Motion: (1/17/23)  Active ROM Right Left   Flexion 165 160*   Abduction 180 145*   Extension TBA TBA   IR/90deg 90 90    ER/90deg 90 90    *pulling    Treatment     Cara received individual therapeutic exercises to improve postural correction and alignment, stretching and soft tissue mobility, and strengthening for 47 minutes including the following:     Seated Exercises:  - UBE, Seat 4, Level 1   2 min each  - Pulleys, flex / scap    2 min each  - Bicep curls, 2#    2 set x 10 reps    Standing Exercises:  - Scapular retractions (yellow)  2 set x 10 reps  - Pec stretch in doorway   30 sec x 3 reps    Mat Exercises:   - Pec stretch over towel roll    2 min  - B shoulder ER over towel roll (yellow)  2 set x 10 reps  - B horizontal ABD over towel roll (red)   2 set x 10 reps  - Butterfly stretch with LTR (behind head)  10 reps B  - Side-lying open book, bilateral   10 reps each  - Side-lying shoulder ABD, bilateral   10 reps each  - Marching while squeezing yellow ball in hands 10 reps (jese  - Bridging with red theraband around knees  2 x 10 reps  - PROM to L shoulder flex, abd, ER at 90   10 reps each      Cara received the following manual therapy techniques were performed to increased myofascial/soft tissue length, mobility and pliability, increase PROM, AROM and function as well as to decrease pain for 10 minutes    - Soft tissue mobilization to left pec stretch by PT    Home Exercise Program and Patient Education     Education provided regarding:  - Role of physical therapy in multi-disciplinary team  - Goals for physical therapy, progress towards goals   - Exercise technique  - Reviewed lymph precautions     Written Home Exercises Provided: Patient instructed to cont prior HEP. Exercises were reviewed and Cara was able to demonstrate them prior to the end of  the session. Cara demonstrated good  understanding of the education provided.     See EMR under Patient Instructions for exercises provided 12/27/2022, Evaluation, 01/03/2023, and 01/10/2023.     Patient has no cultural, educational or language barriers to learning provided.    Assessment     Patient is responding well to physical therapy. Patient was able to perform new exercises and exercise progressions without increase in symptoms. Improved strength demonstrated with increased resistance of horizontal abduction. Improved soft tissue mobility of L pec minor with manual techniques. Will continue to progress as tolerated.    Patient prognosis is Good. Patient will continue to benefit from skilled outpatient physical therapy to address the deficits listed in the problem list chart on initial evaluation, provide patient / family education and to maximize patient's level of independence in the home and community environment.     Anticipated barriers to physical therapy: none anticipated    Goals as follows:  Short Term goals: 4 weeks  1. Patient will demonstrate 100% understanding of lymphedema risk reduction practices to include self monitoring for lymphedema. (progressing, not met)  2. Patient will demonstrate independence with Home Exercise program established. (progressing, not met)  3. Pt will increase AROM/PROM in shoulder abduction ROM to >/=170 degrees on left to improve functional reach, carry, push, pull pain free. (progressing, not met)  4. Pt will increase AROM/PROM in shoulder flexion to >/=160 degrees bilaterally to improve functional reach, carry, push, pull pain free.(Met, 1/17/23)  5. Pt will demo 5/5 strength in LUE for improved functional mobility, endurance, and posture. (progressing, not met)        Long Term Goals: 8 weeks   1.  Pt will increase AROM/PROM in shoulder flexion to >/=170 degrees bilaterally to improve functional reach, carry, push, pull pain free. (progressing, not met)  2. Pt will  report being able to bend down to  items from the floor without pain/issue for improved functional mobility. (progressing, not met)  3. Pt will demonstrate full/maximized tissue mobility to increase ROM and promote healthy tissue to be pain free at discharge. (progressing, not met)  4. Pt will report decrease in overall worst pain to 2/10 at discharge. (progressing, not met)  5. Pt will increase AROM/PROM in shoulder ER at 90 ROM to >/=90 degrees on left to improve functional reach, carry, push, pull pain free. (Met, 1/17/22)  6. Patient will report compliance with walking program 5x week for 30min each day to improve overall cardiovascular function and decrease cancer related fatigue at discharge. (progressing, not met)        Plan   Outpatient physical therapy 2x week for 8 weeks to include the following: Manual Therapy, Neuromuscular Re-ed, Orthotic Management and Training, Patient Education, Self Care, Therapeutic Activities, and Therapeutic Exercise.     Plan of care Certification Period: 12/20/2022 to 2/17/23.    Therapist: Magalis Nayak, PT  1/24/2023

## 2023-01-31 ENCOUNTER — CLINICAL SUPPORT (OUTPATIENT)
Dept: REHABILITATION | Facility: HOSPITAL | Age: 68
End: 2023-01-31
Payer: MEDICARE

## 2023-01-31 DIAGNOSIS — M25.611 DECREASED ROM OF RIGHT SHOULDER: ICD-10-CM

## 2023-01-31 DIAGNOSIS — M25.612 DECREASED ROM OF LEFT SHOULDER: Primary | ICD-10-CM

## 2023-01-31 DIAGNOSIS — R29.3 POOR POSTURE: ICD-10-CM

## 2023-01-31 DIAGNOSIS — R53.1 DECREASED STRENGTH: ICD-10-CM

## 2023-01-31 DIAGNOSIS — M25.512 PAIN IN SHOULDER REGION, LEFT: ICD-10-CM

## 2023-01-31 DIAGNOSIS — Z74.09 IMPAIRED FUNCTIONAL MOBILITY AND ACTIVITY TOLERANCE: ICD-10-CM

## 2023-01-31 PROCEDURE — 97110 THERAPEUTIC EXERCISES: CPT

## 2023-01-31 PROCEDURE — 97140 MANUAL THERAPY 1/> REGIONS: CPT

## 2023-01-31 NOTE — PROGRESS NOTES
Physical Therapy Daily Treatment Note     Date: 1/31/2023   Name: Cara Sharp  Clinic Number: [id number]    Therapy Diagnosis:   Encounter Diagnoses   Name Primary?    Decreased ROM of left shoulder Yes    Decreased ROM of right shoulder     Impaired functional mobility and activity tolerance     Poor posture     Decreased strength     Pain in shoulder region, left        Physician: Jose Bean MD    Physician Orders: PT Eval and Treat   Medical Diagnosis: Malignant neoplasm of upper-outer quadrant of left breast in female, estrogen receptor positive  Evaluation Date: 12/20/2022  Authorization Period Expiration: 12/31/22  Plan of Care Certification Period: 2/17/23  Visit # / Visits authorized: 5 / 12 (plus evaluation)  Insurance: [employer]  FOTO: 1/3     Time In: 2:00 PM  Time Out: 3:00 PM  Total Billable Time: 60 minutes     Precautions: Standard and cancer, hearing impaired      Subjective     Patient reports: a couple of days ago had some soreness on the left, but feels better now. She still gets a pulling sensation in her left underarm when pulling weeds in her garden. She has also been avoiding using the shovel when working in her yard.  She was compliant with home exercise program. Patient received purchased overhead pulleys and has been stretching at home.   Response to Previous Treatment: it was a good stretch, little soreness that night  Pain Scale: Cara rates pain on a scale of 0/10 on VAS.   Pain location: N/A    Fatigue: none  Functional change: less difficulty with general household chores    Treatment:   Chemotherapy: none  Radiation: none  Targeted therapy: started Letrozole March 2022.    Surgery Date: Patient was diagnosed with left breast cancer in October of 2021. Pt had a lumpectomy and SLNB on 12/3/21, which showed she had two types of cancer. She underwent B mastectomy with KOKO flap reconstruction on 2/7/22. Pt had 3 lymph  nodes removed during B mastectomy.      Objective     Objective Measures updated at progress report unless specified.     Shoulder Range of Motion: (1/17/23)  Active ROM Right Left   Flexion 165 160*   Abduction 180 145*   Extension TBA TBA   IR/90deg 90 90    ER/90deg 90 90    *pulling    Treatment     Cara received individual therapeutic exercises to improve postural correction and alignment, stretching and soft tissue mobility, and strengthening for 50 minutes including the following:     Seated Exercises:  - Pulleys, flex / scap    2 min each  - Bicep curls, 2#    2 set x 10 reps  - Physioball roll outs, 3 ways   1 set x 10 reps  - Diagonals, yellow band   1 set x 10 reps     Standing Exercises:  - Scapular retractions (yellow)  2 set x 10 reps  - Pec stretch in doorway   30 sec x 3 reps  - Ext Diagonals, yellow tube   1 set x 10 reps    Mat Exercises:   - Pec stretch over towel roll    2 min  - B shoulder ER over towel roll (yellow)  2 set x 10 reps  - B horizontal ABD over towel roll (red)   2 set x 10 reps  - Butterfly stretch with LTR (behind head)  10 reps B  - Marching while squeezing yellow ball in hands 10 reps (jese  - Bridging with red theraband around knees  2 x 10 reps  - PROM to L shoulder flex, abd, ER at 90   10 reps each    Exercises held today  - UBE, Seat 4, Level 1                                     2 min each  - Side-lying open book, bilateral                                 10 reps each  - Side-lying shoulder ABD, bilateral                            10 reps each    Cara received the following manual therapy techniques were performed to increased myofascial/soft tissue length, mobility and pliability, increase PROM, AROM and function as well as to decrease pain for 10 minutes    - Soft tissue mobilization to left pec stretch and L lats by PT    Home Exercise Program and Patient Education     Education provided regarding:  - Role of physical therapy in multi-disciplinary team  - Goals for  physical therapy, progress towards goals   - Exercise technique  - Reviewed lymph precautions     Written Home Exercises Provided: Patient instructed to cont prior HEP. Exercises were reviewed and Cara was able to demonstrate them prior to the end of the session. Cara demonstrated good  understanding of the education provided.     See EMR under Patient Instructions for exercises provided 12/27/2022, Evaluation, 01/03/2023, and 01/10/2023.     Patient has no cultural, educational or language barriers to learning provided.    Assessment     Patient is responding well to physical therapy. Patient was able to perform all of today's new exercises with no increase in symptoms prior to leaving the clinic. She required occasional cues with her usual exercises for correct technique. She is responding well to manual therapy as reports decreased pulling in her left arm with AROM/PROM. Will continue to progress as tolerated.    Patient prognosis is Good. Patient will continue to benefit from skilled outpatient physical therapy to address the deficits listed in the problem list chart on initial evaluation, provide patient / family education and to maximize patient's level of independence in the home and community environment.     Anticipated barriers to physical therapy: none anticipated    Goals as follows:  Short Term goals: 4 weeks  1. Patient will demonstrate 100% understanding of lymphedema risk reduction practices to include self monitoring for lymphedema. (progressing, not met)  2. Patient will demonstrate independence with Home Exercise program established. (progressing, not met)  3. Pt will increase AROM/PROM in shoulder abduction ROM to >/=170 degrees on left to improve functional reach, carry, push, pull pain free. (progressing, not met)  4. Pt will increase AROM/PROM in shoulder flexion to >/=160 degrees bilaterally to improve functional reach, carry, push, pull pain free.(Met, 1/17/23)  5. Pt will demo 5/5  strength in LUE for improved functional mobility, endurance, and posture. (progressing, not met)        Long Term Goals: 8 weeks   1.  Pt will increase AROM/PROM in shoulder flexion to >/=170 degrees bilaterally to improve functional reach, carry, push, pull pain free. (progressing, not met)  2. Pt will report being able to bend down to  items from the floor without pain/issue for improved functional mobility. (progressing, not met)  3. Pt will demonstrate full/maximized tissue mobility to increase ROM and promote healthy tissue to be pain free at discharge. (progressing, not met)  4. Pt will report decrease in overall worst pain to 2/10 at discharge. (progressing, not met)  5. Pt will increase AROM/PROM in shoulder ER at 90 ROM to >/=90 degrees on left to improve functional reach, carry, push, pull pain free. (Met, 1/17/22)  6. Patient will report compliance with walking program 5x week for 30min each day to improve overall cardiovascular function and decrease cancer related fatigue at discharge. (progressing, not met)        Plan   Outpatient physical therapy 2x week for 8 weeks to include the following: Manual Therapy, Neuromuscular Re-ed, Orthotic Management and Training, Patient Education, Self Care, Therapeutic Activities, and Therapeutic Exercise.     Plan of care Certification Period: 12/20/2022 to 2/17/23.    Therapist: Sabina Gandhi, PT  1/31/2023

## 2023-02-09 ENCOUNTER — TELEPHONE (OUTPATIENT)
Dept: HEMATOLOGY/ONCOLOGY | Facility: CLINIC | Age: 68
End: 2023-02-09
Payer: MEDICARE

## 2023-02-10 ENCOUNTER — HOSPITAL ENCOUNTER (OUTPATIENT)
Dept: RADIOLOGY | Facility: HOSPITAL | Age: 68
Discharge: HOME | End: 2023-02-10
Attending: INTERNAL MEDICINE
Payer: MEDICARE

## 2023-02-10 DIAGNOSIS — R91.1 SOLITARY PULMONARY NODULE: ICD-10-CM

## 2023-02-10 PROCEDURE — 25500020 PHARM REV CODE 255: Performed by: INTERNAL MEDICINE

## 2023-02-10 PROCEDURE — 74160 CT ABDOMEN W/CONTRAST: CPT | Mod: 26,,, | Performed by: RADIOLOGY

## 2023-02-10 PROCEDURE — 71260 CT CHEST ABDOMEN WITH CONTRAST (XPD): ICD-10-PCS | Mod: 26,,, | Performed by: RADIOLOGY

## 2023-02-10 PROCEDURE — 74160 CT CHEST ABDOMEN WITH CONTRAST (XPD): ICD-10-PCS | Mod: 26,,, | Performed by: RADIOLOGY

## 2023-02-10 PROCEDURE — 74160 CT ABDOMEN W/CONTRAST: CPT | Mod: TC

## 2023-02-10 PROCEDURE — 71260 CT THORAX DX C+: CPT | Mod: 26,,, | Performed by: RADIOLOGY

## 2023-02-10 RX ADMIN — IOHEXOL 75 ML: 350 INJECTION, SOLUTION INTRAVENOUS at 09:02

## 2023-02-14 ENCOUNTER — OFFICE VISIT (OUTPATIENT)
Dept: HEMATOLOGY/ONCOLOGY | Facility: CLINIC | Age: 68
End: 2023-02-14
Payer: MEDICARE

## 2023-02-14 VITALS
HEIGHT: 66 IN | WEIGHT: 173.5 LBS | BODY MASS INDEX: 27.88 KG/M2 | TEMPERATURE: 98 F | OXYGEN SATURATION: 97 % | RESPIRATION RATE: 18 BRPM | DIASTOLIC BLOOD PRESSURE: 73 MMHG | SYSTOLIC BLOOD PRESSURE: 157 MMHG | HEART RATE: 74 BPM

## 2023-02-14 DIAGNOSIS — C50.412 MALIGNANT NEOPLASM OF UPPER-OUTER QUADRANT OF LEFT BREAST IN FEMALE, ESTROGEN RECEPTOR POSITIVE: Primary | ICD-10-CM

## 2023-02-14 DIAGNOSIS — Z17.0 MALIGNANT NEOPLASM OF UPPER-OUTER QUADRANT OF LEFT BREAST IN FEMALE, ESTROGEN RECEPTOR POSITIVE: Primary | ICD-10-CM

## 2023-02-14 DIAGNOSIS — F19.982 DRUG-INDUCED INSOMNIA: ICD-10-CM

## 2023-02-14 PROCEDURE — 1159F MED LIST DOCD IN RCRD: CPT | Mod: CPTII,S$GLB,, | Performed by: INTERNAL MEDICINE

## 2023-02-14 PROCEDURE — 3078F PR MOST RECENT DIASTOLIC BLOOD PRESSURE < 80 MM HG: ICD-10-PCS | Mod: CPTII,S$GLB,, | Performed by: INTERNAL MEDICINE

## 2023-02-14 PROCEDURE — 3077F SYST BP >= 140 MM HG: CPT | Mod: CPTII,S$GLB,, | Performed by: INTERNAL MEDICINE

## 2023-02-14 PROCEDURE — 1126F PR PAIN SEVERITY QUANTIFIED, NO PAIN PRESENT: ICD-10-PCS | Mod: CPTII,S$GLB,, | Performed by: INTERNAL MEDICINE

## 2023-02-14 PROCEDURE — 99999 PR PBB SHADOW E&M-EST. PATIENT-LVL III: CPT | Mod: PBBFAC,,, | Performed by: INTERNAL MEDICINE

## 2023-02-14 PROCEDURE — 1126F AMNT PAIN NOTED NONE PRSNT: CPT | Mod: CPTII,S$GLB,, | Performed by: INTERNAL MEDICINE

## 2023-02-14 PROCEDURE — 3077F PR MOST RECENT SYSTOLIC BLOOD PRESSURE >= 140 MM HG: ICD-10-PCS | Mod: CPTII,S$GLB,, | Performed by: INTERNAL MEDICINE

## 2023-02-14 PROCEDURE — 1159F PR MEDICATION LIST DOCUMENTED IN MEDICAL RECORD: ICD-10-PCS | Mod: CPTII,S$GLB,, | Performed by: INTERNAL MEDICINE

## 2023-02-14 PROCEDURE — 99999 PR PBB SHADOW E&M-EST. PATIENT-LVL III: ICD-10-PCS | Mod: PBBFAC,,, | Performed by: INTERNAL MEDICINE

## 2023-02-14 PROCEDURE — 3008F PR BODY MASS INDEX (BMI) DOCUMENTED: ICD-10-PCS | Mod: CPTII,S$GLB,, | Performed by: INTERNAL MEDICINE

## 2023-02-14 PROCEDURE — 99214 PR OFFICE/OUTPT VISIT, EST, LEVL IV, 30-39 MIN: ICD-10-PCS | Mod: S$GLB,,, | Performed by: INTERNAL MEDICINE

## 2023-02-14 PROCEDURE — 3078F DIAST BP <80 MM HG: CPT | Mod: CPTII,S$GLB,, | Performed by: INTERNAL MEDICINE

## 2023-02-14 PROCEDURE — 99214 OFFICE O/P EST MOD 30 MIN: CPT | Mod: S$GLB,,, | Performed by: INTERNAL MEDICINE

## 2023-02-14 PROCEDURE — 3008F BODY MASS INDEX DOCD: CPT | Mod: CPTII,S$GLB,, | Performed by: INTERNAL MEDICINE

## 2023-02-14 RX ORDER — DOXEPIN HYDROCHLORIDE 10 MG/1
10 CAPSULE ORAL NIGHTLY
Qty: 30 CAPSULE | Refills: 11 | Status: SHIPPED | OUTPATIENT
Start: 2023-02-14 | End: 2023-03-24 | Stop reason: SDUPTHER

## 2023-02-14 NOTE — PROGRESS NOTES
Subjective:       Patient ID: Cara Sharp is a 67 y.o. female.    Chief Complaint: No chief complaint on file.      HPI 67-year-old female seen in Oncology Clinic for follow-up of a diagnosis of left breast cancer.She is on adjuvant letrozole.  She is a patient of .     She had findings of a hepatic density and pulmonary nodule seen on her pre-surgical CT scans.    CT 2/10/23 - Lungs/Pleura: Prominent bandlike opacities within the bilateral lower lobes suggestive of scarring or atelectasis.  Along the periphery of the left lower lobe and there is a 1.3 x 0.9 cm nodule.  No other concerning nodules within the lungs.  Mild component of dependent atelectasis bilaterally..  No focal consolidation pneumothorax, or pleural fluid.     Hilum/Mediastinum: no hilar or mediastinal lymph node enlargement.     Liver: The liver is normal in size and attenuation.  Subcentimeter hypodensity in the left hepatic lobe likely cyst.  No evidence of concerning hepatic lesions.     Lymph Nodes: Borderline size aortocaval lymph node up to 10 mm in the short axis (3-84)..     Bones: 1.4 cm mixed lucent and sclerotic lesion within the sternum.  Additional punctate focus of sclerosis within the spinous process of T4, likely bone island.  No evidence of other suspicious lesions throughout the bones.  There is narrowing of the spinous processes within the lumbar spine which could represent Basstrup's disease.  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  No evidence of acute fracture      Physically she is better after PT for her left arm.  Did not tolerate trazodoen.      Breast cancer history:  Mammogram on October 12, 2020 demonstrated an irregular mass in the upper outer quadrant left breast measuring 1.4 cm. Ultrasound showed a 1.2 cm hypoechoic mass.    On October 25, 2021 biopsy was performed which showed infiltrating ductal carcinoma with lobular features with some associated  intermediate grade DCIS (histologic grade 3, nuclear grade 2, mitotic index 1).  Tumor cells were 96% ER positive, 95% WV positive, HER2 was 1+ and Ki-67 was 9%.  Largest focus was 10 mm.    Pre surgical CT of the abdomen and pelvis on 2/3/21  showed a 9 mm left hepatic density and a 1 cm LLL pulmonary nodule.    On 2/7/22 she underwent bilateral nipple sparing mastectomy with autologous reconstruction - KOKO flaps  ([facility] - Dr. Butcher)  The pathology from that surgery showed 2 mm of residual invasive cancer in the left breast with 3 negative sentinel lymph nodes.T1bNo.  The right breast showed no significant abnormality.    Oncotype score was 6.    Letrozole started March 2022.    BMD normal in March 2022.     Review of Systems   Constitutional:  Negative for activity change, appetite change, fever and unexpected weight change.   HENT:  Positive for hearing loss. Negative for mouth sores.    Eyes:  Negative for visual disturbance.   Respiratory:  Negative for cough and shortness of breath.    Cardiovascular:  Negative for chest pain.   Gastrointestinal:  Negative for abdominal pain and diarrhea.   Genitourinary: Negative.  Negative for frequency.   Musculoskeletal:  Negative for back pain.   Integumentary:  Negative for rash.   Neurological:  Negative for headaches.   Hematological:  Negative for adenopathy.   Psychiatric/Behavioral:  The patient is not nervous/anxious.        Objective:      Physical Exam  Vitals reviewed.   Constitutional:       General: She is not in acute distress.  HENT:      Head: Normocephalic.      Mouth/Throat:      Mouth: Mucous membranes are moist.      Pharynx: Oropharynx is clear. No oropharyngeal exudate or posterior oropharyngeal erythema.   Eyes:      General: No scleral icterus.  Cardiovascular:      Rate and Rhythm: Normal rate and regular rhythm.   Pulmonary:      Effort: Pulmonary effort is normal. No respiratory distress.      Breath sounds: Normal breath sounds. No  wheezing or rales.   Chest:       Abdominal:      Palpations: Abdomen is soft. There is no mass.      Tenderness: There is no abdominal tenderness.   Lymphadenopathy:      Cervical: No cervical adenopathy.      Upper Body:      Right upper body: No supraclavicular or axillary adenopathy.      Left upper body: No supraclavicular or axillary adenopathy.   Skin:     Findings: No rash.   Neurological:      Mental Status: She is alert and oriented to person, place, and time.   Psychiatric:         Mood and Affect: Mood normal.         Behavior: Behavior normal.         Thought Content: Thought content normal.         Judgment: Judgment normal.       Assessment:       Problem List Items Addressed This Visit       Malignant neoplasm of upper-outer quadrant of left breast in female, estrogen receptor positive - Primary       Plan:       RTC 3 M  Will get her previous images for comparison.  I discussed the need to consider biopsy if there is a question as to the cause of the radiographic findings.    Doxepin for sleep         Route Chart for Scheduling    Med Onc Chart Routing      Follow up with physician 3 months.   Follow up with [facility]    Infusion scheduling note    Injection scheduling note    Labs    Imaging    Pharmacy appointment    Other referrals

## 2023-02-15 LAB
CREAT SERPL-MCNC: 0.7 MG/DL (ref 0.5–1.4)
SAMPLE: NORMAL

## 2023-02-27 ENCOUNTER — DOCUMENTATION ONLY (OUTPATIENT)
Dept: HEMATOLOGY/ONCOLOGY | Facility: CLINIC | Age: 68
End: 2023-02-27
Payer: MEDICARE

## 2023-02-28 ENCOUNTER — PATIENT MESSAGE (OUTPATIENT)
Dept: HEMATOLOGY/ONCOLOGY | Facility: CLINIC | Age: 68
End: 2023-02-28
Payer: MEDICARE

## 2023-03-01 ENCOUNTER — PATIENT MESSAGE (OUTPATIENT)
Dept: HEMATOLOGY/ONCOLOGY | Facility: CLINIC | Age: 68
End: 2023-03-01
Payer: MEDICARE

## 2023-03-01 DIAGNOSIS — E04.2 NONTOXIC MULTINODULAR GOITER: Primary | ICD-10-CM

## 2023-03-08 ENCOUNTER — PATIENT MESSAGE (OUTPATIENT)
Dept: HEMATOLOGY/ONCOLOGY | Facility: CLINIC | Age: 68
End: 2023-03-08
Payer: MEDICARE

## 2023-03-14 ENCOUNTER — PATIENT MESSAGE (OUTPATIENT)
Dept: HEMATOLOGY/ONCOLOGY | Facility: CLINIC | Age: 68
End: 2023-03-14
Payer: MEDICARE

## 2023-03-15 ENCOUNTER — DOCUMENTATION ONLY (OUTPATIENT)
Dept: HEMATOLOGY/ONCOLOGY | Facility: CLINIC | Age: 68
End: 2023-03-15
Payer: MEDICARE

## 2023-03-15 NOTE — PROGRESS NOTES
Reviewed CTs - Feb 2022 and Feb 2023.  LLL nodule is unchanged.  The sternal changes were not seen on CT(did not cover that area) but may have been there on MRI from December 2021.    I discussed with the patient.    We will repeat chest Ct in 6 M.

## 2023-03-24 ENCOUNTER — DOCUMENTATION ONLY (OUTPATIENT)
Dept: HEMATOLOGY/ONCOLOGY | Facility: CLINIC | Age: 68
End: 2023-03-24
Payer: MEDICARE

## 2023-03-24 DIAGNOSIS — F19.982 DRUG-INDUCED INSOMNIA: ICD-10-CM

## 2023-03-24 RX ORDER — DOXEPIN HYDROCHLORIDE 10 MG/1
10 CAPSULE ORAL NIGHTLY
Qty: 30 CAPSULE | Refills: 11 | Status: SHIPPED | OUTPATIENT
Start: 2023-03-24 | End: 2024-02-29

## 2023-05-04 ENCOUNTER — HOSPITAL ENCOUNTER (OUTPATIENT)
Dept: RADIOLOGY | Facility: HOSPITAL | Age: 68
Discharge: HOME | End: 2023-05-04
Attending: SPECIALIST
Payer: MEDICARE

## 2023-05-04 DIAGNOSIS — E04.2 NONTOXIC MULTINODULAR GOITER: ICD-10-CM

## 2023-05-04 PROCEDURE — 76536 US EXAM OF HEAD AND NECK: CPT | Mod: 26,,, | Performed by: RADIOLOGY

## 2023-05-04 PROCEDURE — 76536 US THYROID: ICD-10-PCS | Mod: 26,,, | Performed by: RADIOLOGY

## 2023-05-04 PROCEDURE — 76536 US EXAM OF HEAD AND NECK: CPT | Mod: TC

## 2023-05-16 ENCOUNTER — OFFICE VISIT (OUTPATIENT)
Dept: HEMATOLOGY/ONCOLOGY | Facility: CLINIC | Age: 68
End: 2023-05-16
Payer: MEDICARE

## 2023-05-16 VITALS
SYSTOLIC BLOOD PRESSURE: 139 MMHG | TEMPERATURE: 99 F | HEART RATE: 83 BPM | BODY MASS INDEX: 28.98 KG/M2 | OXYGEN SATURATION: 98 % | HEIGHT: 65 IN | RESPIRATION RATE: 98 BRPM | WEIGHT: 173.94 LBS | DIASTOLIC BLOOD PRESSURE: 68 MMHG

## 2023-05-16 DIAGNOSIS — R91.1 SOLITARY PULMONARY NODULE: ICD-10-CM

## 2023-05-16 DIAGNOSIS — R91.1 PULMONARY NODULE: ICD-10-CM

## 2023-05-16 DIAGNOSIS — C50.412 MALIGNANT NEOPLASM OF UPPER-OUTER QUADRANT OF LEFT BREAST IN FEMALE, ESTROGEN RECEPTOR POSITIVE: Primary | ICD-10-CM

## 2023-05-16 DIAGNOSIS — Z17.0 MALIGNANT NEOPLASM OF UPPER-OUTER QUADRANT OF LEFT BREAST IN FEMALE, ESTROGEN RECEPTOR POSITIVE: Primary | ICD-10-CM

## 2023-05-16 PROCEDURE — 1126F PR PAIN SEVERITY QUANTIFIED, NO PAIN PRESENT: ICD-10-PCS | Mod: CPTII,,, | Performed by: INTERNAL MEDICINE

## 2023-05-16 PROCEDURE — 1126F AMNT PAIN NOTED NONE PRSNT: CPT | Mod: CPTII,,, | Performed by: INTERNAL MEDICINE

## 2023-05-16 PROCEDURE — 3008F BODY MASS INDEX DOCD: CPT | Mod: CPTII,,, | Performed by: INTERNAL MEDICINE

## 2023-05-16 PROCEDURE — 3008F PR BODY MASS INDEX (BMI) DOCUMENTED: ICD-10-PCS | Mod: CPTII,,, | Performed by: INTERNAL MEDICINE

## 2023-05-16 PROCEDURE — 99999 PR PBB SHADOW E&M-EST. PATIENT-LVL III: ICD-10-PCS | Mod: PBBFAC,,, | Performed by: INTERNAL MEDICINE

## 2023-05-16 PROCEDURE — 99213 PR OFFICE/OUTPT VISIT, EST, LEVL III, 20-29 MIN: ICD-10-PCS | Mod: ,,, | Performed by: INTERNAL MEDICINE

## 2023-05-16 PROCEDURE — 3075F PR MOST RECENT SYSTOLIC BLOOD PRESS GE 130-139MM HG: ICD-10-PCS | Mod: CPTII,,, | Performed by: INTERNAL MEDICINE

## 2023-05-16 PROCEDURE — 99999 PR PBB SHADOW E&M-EST. PATIENT-LVL III: CPT | Mod: PBBFAC,,, | Performed by: INTERNAL MEDICINE

## 2023-05-16 PROCEDURE — 3075F SYST BP GE 130 - 139MM HG: CPT | Mod: CPTII,,, | Performed by: INTERNAL MEDICINE

## 2023-05-16 PROCEDURE — 3078F DIAST BP <80 MM HG: CPT | Mod: CPTII,,, | Performed by: INTERNAL MEDICINE

## 2023-05-16 PROCEDURE — 99213 OFFICE O/P EST LOW 20 MIN: CPT | Mod: ,,, | Performed by: INTERNAL MEDICINE

## 2023-05-16 PROCEDURE — 3078F PR MOST RECENT DIASTOLIC BLOOD PRESSURE < 80 MM HG: ICD-10-PCS | Mod: CPTII,,, | Performed by: INTERNAL MEDICINE

## 2023-05-16 RX ORDER — PAROXETINE HYDROCHLORIDE 20 MG/1
TABLET, FILM COATED ORAL
COMMUNITY
Start: 2023-04-07 | End: 2024-02-29 | Stop reason: SDUPTHER

## 2023-05-16 NOTE — PROGRESS NOTES
Subjective:       Patient ID: Cara Sharp is a 67 y.o. female.    Chief Complaint: No chief complaint on file.      HPI 67-year-old female seen in Oncology Clinic for follow-up of a diagnosis of left breast cancer.She is on adjuvant letrozole.  She is a patient of .     She had findings of a hepatic density and pulmonary nodule seen on her pre-surgical CT scans.    CT 2/10/23 - Lungs/Pleura: Prominent bandlike opacities within the bilateral lower lobes suggestive of scarring or atelectasis.  Along the periphery of the left lower lobe and there is a 1.3 x 0.9 cm nodule.  No other concerning nodules within the lungs.  Mild component of dependent atelectasis bilaterally..  No focal consolidation pneumothorax, or pleural fluid.     Hilum/Mediastinum: no hilar or mediastinal lymph node enlargement.     Liver: The liver is normal in size and attenuation.  Subcentimeter hypodensity in the left hepatic lobe likely cyst.  No evidence of concerning hepatic lesions.     Lymph Nodes: Borderline size aortocaval lymph node up to 10 mm in the short axis (3-84)..     Bones: 1.4 cm mixed lucent and sclerotic lesion within the sternum.  Additional punctate focus of sclerosis within the spinous process of T4, likely bone island.  No evidence of other suspicious lesions throughout the bones.  There is narrowing of the spinous processes within the lumbar spine which could represent Basstrup's disease.  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  No evidence of acute fracture    Reviewed CTs - Feb 2022 and Feb 2023.  LLL nodule is unchanged.  The sternal changes were not seen on CT(did not cover that area) but may have been there on MRI from December 2021.        Today she reports that she is feeling well.  She is tolerating her letrozole without any problems.  Has unusual pain or shortness of breath.      Breast cancer history:  Mammogram on October 12, 2020 demonstrated an irregular  mass in the upper outer quadrant left breast measuring 1.4 cm. Ultrasound showed a 1.2 cm hypoechoic mass.    On October 25, 2021 biopsy was performed which showed infiltrating ductal carcinoma with lobular features with some associated intermediate grade DCIS (histologic grade 3, nuclear grade 2, mitotic index 1).  Tumor cells were 96% ER positive, 95% DE positive, HER2 was 1+ and Ki-67 was 9%.  Largest focus was 10 mm.    Pre surgical CT of the abdomen and pelvis on 2/3/21  showed a 9 mm left hepatic density and a 1 cm LLL pulmonary nodule.    On 2/7/22 she underwent bilateral nipple sparing mastectomy with autologous reconstruction - KOKO flaps  ([facility] - Dr. Butcher)  The pathology from that surgery showed 2 mm of residual invasive cancer in the left breast with 3 negative sentinel lymph nodes.T1bNo.  The right breast showed no significant abnormality.    Oncotype score was 6.    Letrozole started March 2022.    BMD normal in March 2022.     Review of Systems   Constitutional:  Negative for activity change, appetite change, fever and unexpected weight change.   HENT:  Positive for hearing loss. Negative for mouth sores.    Eyes:  Negative for visual disturbance.   Respiratory:  Negative for cough and shortness of breath.    Cardiovascular:  Negative for chest pain.   Gastrointestinal:  Negative for abdominal pain and diarrhea.   Genitourinary: Negative.  Negative for frequency.   Musculoskeletal:  Negative for back pain.   Integumentary:  Negative for rash.   Neurological:  Negative for headaches.   Hematological:  Negative for adenopathy.   Psychiatric/Behavioral:  The patient is not nervous/anxious.        Objective:      Physical Exam  Vitals reviewed.   Constitutional:       General: She is not in acute distress.  HENT:      Head: Normocephalic.      Mouth/Throat:      Mouth: Mucous membranes are moist.      Pharynx: Oropharynx is clear. No oropharyngeal exudate or posterior oropharyngeal erythema.    Eyes:      General: No scleral icterus.  Cardiovascular:      Rate and Rhythm: Normal rate and regular rhythm.   Pulmonary:      Effort: Pulmonary effort is normal. No respiratory distress.      Breath sounds: Normal breath sounds. No wheezing or rales.   Chest:       Abdominal:      Palpations: Abdomen is soft. There is no mass.      Tenderness: There is no abdominal tenderness.   Lymphadenopathy:      Cervical: No cervical adenopathy.      Upper Body:      Right upper body: No supraclavicular or axillary adenopathy.      Left upper body: No supraclavicular or axillary adenopathy.   Skin:     Findings: No rash.   Neurological:      Mental Status: She is alert and oriented to person, place, and time.   Psychiatric:         Mood and Affect: Mood normal.         Behavior: Behavior normal.         Thought Content: Thought content normal.         Judgment: Judgment normal.       Assessment:       Problem List Items Addressed This Visit       Malignant neoplasm of upper-outer quadrant of left breast in female, estrogen receptor positive - Primary       Plan:       RTC 3 M with CT c hest           Route Chart for Scheduling    Med Onc Chart Routing      Follow up with physician 3 months. after August 9th   Follow up with [facility]    Infusion scheduling note    Injection scheduling note    Labs None   Scheduling:  Preferred lab:  Lab interval:     Imaging   CT Chest   Pharmacy appointment    Other referrals

## 2023-05-18 ENCOUNTER — PATIENT MESSAGE (OUTPATIENT)
Dept: HEMATOLOGY/ONCOLOGY | Facility: CLINIC | Age: 68
End: 2023-05-18
Payer: MEDICARE

## 2023-06-01 ENCOUNTER — TELEPHONE (OUTPATIENT)
Dept: HEMATOLOGY/ONCOLOGY | Facility: CLINIC | Age: 68
End: 2023-06-01
Payer: MEDICARE

## 2023-06-01 ENCOUNTER — PATIENT MESSAGE (OUTPATIENT)
Dept: HEMATOLOGY/ONCOLOGY | Facility: CLINIC | Age: 68
End: 2023-06-01
Payer: MEDICARE

## 2023-06-01 NOTE — TELEPHONE ENCOUNTER
----- Message from Natacha Jean-Baptiste RN sent at 6/1/2023 11:05 AM CDT -----  Regarding: FW: Pt advice  Contact: Pt    ----- Message -----  From: Eden Campos  Sent: 6/1/2023  11:00 AM CDT  To: [facility] Staff  Subject: Pt advice                                        Pt is requesting a callback from nurse in regards to portal messages that were sent on today. Please adv pt        Confirmed contact below:   Contact Name:Cara Sharp  Phone Number: [phone]

## 2023-07-18 ENCOUNTER — OFFICE VISIT (OUTPATIENT)
Dept: OBSTETRICS AND GYNECOLOGY | Facility: CLINIC | Age: 68
End: 2023-07-18
Payer: MEDICARE

## 2023-07-18 VITALS
BODY MASS INDEX: 29.13 KG/M2 | DIASTOLIC BLOOD PRESSURE: 80 MMHG | SYSTOLIC BLOOD PRESSURE: 162 MMHG | WEIGHT: 175.06 LBS

## 2023-07-18 DIAGNOSIS — Z01.419 WELL WOMAN EXAM WITH ROUTINE GYNECOLOGICAL EXAM: Primary | ICD-10-CM

## 2023-07-18 PROCEDURE — 3079F PR MOST RECENT DIASTOLIC BLOOD PRESSURE 80-89 MM HG: ICD-10-PCS | Mod: CPTII,S$GLB,, | Performed by: OBSTETRICS & GYNECOLOGY

## 2023-07-18 PROCEDURE — G0101 PR CA SCREEN;PELVIC/BREAST EXAM: ICD-10-PCS | Mod: GZ,S$GLB,, | Performed by: OBSTETRICS & GYNECOLOGY

## 2023-07-18 PROCEDURE — 3079F DIAST BP 80-89 MM HG: CPT | Mod: CPTII,S$GLB,, | Performed by: OBSTETRICS & GYNECOLOGY

## 2023-07-18 PROCEDURE — 1159F MED LIST DOCD IN RCRD: CPT | Mod: CPTII,S$GLB,, | Performed by: OBSTETRICS & GYNECOLOGY

## 2023-07-18 PROCEDURE — 1126F AMNT PAIN NOTED NONE PRSNT: CPT | Mod: CPTII,S$GLB,, | Performed by: OBSTETRICS & GYNECOLOGY

## 2023-07-18 PROCEDURE — 99999 PR PBB SHADOW E&M-EST. PATIENT-LVL II: CPT | Mod: PBBFAC,,, | Performed by: OBSTETRICS & GYNECOLOGY

## 2023-07-18 PROCEDURE — 3288F PR FALLS RISK ASSESSMENT DOCUMENTED: ICD-10-PCS | Mod: CPTII,S$GLB,, | Performed by: OBSTETRICS & GYNECOLOGY

## 2023-07-18 PROCEDURE — 3077F SYST BP >= 140 MM HG: CPT | Mod: CPTII,S$GLB,, | Performed by: OBSTETRICS & GYNECOLOGY

## 2023-07-18 PROCEDURE — 1101F PT FALLS ASSESS-DOCD LE1/YR: CPT | Mod: CPTII,S$GLB,, | Performed by: OBSTETRICS & GYNECOLOGY

## 2023-07-18 PROCEDURE — 1160F RVW MEDS BY RX/DR IN RCRD: CPT | Mod: CPTII,S$GLB,, | Performed by: OBSTETRICS & GYNECOLOGY

## 2023-07-18 PROCEDURE — 3008F PR BODY MASS INDEX (BMI) DOCUMENTED: ICD-10-PCS | Mod: CPTII,S$GLB,, | Performed by: OBSTETRICS & GYNECOLOGY

## 2023-07-18 PROCEDURE — 1160F PR REVIEW ALL MEDS BY PRESCRIBER/CLIN PHARMACIST DOCUMENTED: ICD-10-PCS | Mod: CPTII,S$GLB,, | Performed by: OBSTETRICS & GYNECOLOGY

## 2023-07-18 PROCEDURE — 1159F PR MEDICATION LIST DOCUMENTED IN MEDICAL RECORD: ICD-10-PCS | Mod: CPTII,S$GLB,, | Performed by: OBSTETRICS & GYNECOLOGY

## 2023-07-18 PROCEDURE — 1101F PR PT FALLS ASSESS DOC 0-1 FALLS W/OUT INJ PAST YR: ICD-10-PCS | Mod: CPTII,S$GLB,, | Performed by: OBSTETRICS & GYNECOLOGY

## 2023-07-18 PROCEDURE — 99999 PR PBB SHADOW E&M-EST. PATIENT-LVL II: ICD-10-PCS | Mod: PBBFAC,,, | Performed by: OBSTETRICS & GYNECOLOGY

## 2023-07-18 PROCEDURE — 3008F BODY MASS INDEX DOCD: CPT | Mod: CPTII,S$GLB,, | Performed by: OBSTETRICS & GYNECOLOGY

## 2023-07-18 PROCEDURE — 3288F FALL RISK ASSESSMENT DOCD: CPT | Mod: CPTII,S$GLB,, | Performed by: OBSTETRICS & GYNECOLOGY

## 2023-07-18 PROCEDURE — G0101 CA SCREEN;PELVIC/BREAST EXAM: HCPCS | Mod: GZ,S$GLB,, | Performed by: OBSTETRICS & GYNECOLOGY

## 2023-07-18 PROCEDURE — 1126F PR PAIN SEVERITY QUANTIFIED, NO PAIN PRESENT: ICD-10-PCS | Mod: CPTII,S$GLB,, | Performed by: OBSTETRICS & GYNECOLOGY

## 2023-07-18 PROCEDURE — 3077F PR MOST RECENT SYSTOLIC BLOOD PRESSURE >= 140 MM HG: ICD-10-PCS | Mod: CPTII,S$GLB,, | Performed by: OBSTETRICS & GYNECOLOGY

## 2023-07-18 NOTE — PROGRESS NOTES
History & Physical  Gynecology      SUBJECTIVE:     Chief Complaint: Well Woman       History of Present Illness:  Annual Exam-Postmenopausal  Patient presents for annual exam. The patient has no complaints today. Patient denies post-menopausal vaginal bleeding.    The patient is not sexually active.  The patient participates in regular exercise: no.  She does no smoke.     GYN screening history: last pap: approximate date 2019 paphpv and was normal  Mammogram history: current breast cancer, left mastectomy on 12/3/21; had a right mastectomy on 2/2022; taking letrozole  Colonoscopy history: due 2025 per patient  Dexa history: done at     [facility]:   Breast cancer: neg  Colon cancer: neg  Ovarian cancer: mat aunt    Review of patient's allergies indicates:   Allergen Reactions    Ibuprofen Hives and Shortness Of Breath    Naproxen Hives, Shortness Of Breath and Rash       Past Medical History:   Diagnosis Date    H/O mammogram 11/16/2017    Birads 2/B  @ [facility]     Hearing impaired     History of bone density study 05/09/2018    Spine T-score: 0.9   //   Rt Hip T-score:  0.0   @ [facility]     Hyperlipidemia     Menopause 2009    Occult blood positive stool     Pap smear for cervical cancer screening 08/31/2015    Negative/ Hpv Neg    Relies on partner vasectomy for contraception     Thyroid disease     nodules     Past Surgical History:   Procedure Laterality Date    CARPAL TUNNEL RELEASE Left 1997    COLONOSCOPY  10/07/2020    Diverticulitis ( Rtn 5 yrs) Dr Ashish Leal    DILATION AND CURETTAGE OF UTERUS  1985    LAPAROSCOPY FOR ENDOMETRIOSIS    INJECTION FOR SENTINEL NODE IDENTIFICATION Left 12/3/2021    Procedure: INJECTION, FOR SENTINEL NODE IDENTIFICATION-Left;  Surgeon: ERWIN Chi MD;  Location: [facility] OR;  Service: General;  Laterality: Left;    MASTECTOMY, PARTIAL Left 12/3/2021    Procedure: MASTECTOMY, PARTIAL-Left with radiological marker;  Surgeon: ERWIN Chi MD;  Location: [facility] OR;  Service: General;   Laterality: Left;    SENTINEL LYMPH NODE BIOPSY Left 12/3/2021    Procedure: BIOPSY, LYMPH NODE, SENTINEL-Left;  Surgeon: ERWIN Chi MD;  Location: [facility] OR;  Service: General;  Laterality: Left;    THYROIDECTOMY, PARTIAL  2011    Nodules, Dr Blackwell ([facility])    TONSILLECTOMY       OB History          2    Para   2    Term   2            AB        Living   2         SAB        IAB        Ectopic        Multiple        Live Births   2           Obstetric Comments   Age at menarche 13             Family History   Problem Relation Age of Onset    Lung cancer Father 80    Hypertension Father     Hyperlipidemia Father     Cancer Father     Thyroid disease Mother     Hypertension Mother     Hyperlipidemia Mother     Ovarian cancer Maternal Aunt 52    Cancer Maternal Aunt     Cervical cancer Sister         Pre Cancerous - Total Hyst done     Breast cancer Neg Hx     Colon cancer Neg Hx      Social History     Tobacco Use    Smoking status: Never    Smokeless tobacco: Never   Substance Use Topics    Alcohol use: Yes     Comment: Social    Drug use: No       Current Outpatient Medications   Medication Sig    atorvastatin (LIPITOR) 40 MG tablet Take 40 mg by mouth once daily.    doxepin (SINEQUAN) 10 MG capsule Take 1 capsule (10 mg total) by mouth every evening.    ergocalciferol, vitamin D2, (VITAMIN D ORAL) Take by mouth.    letrozole (FEMARA) 2.5 mg Tab TAKE 1 TABLET EVERY DAY    levothyroxine (SYNTHROID) 88 MCG tablet     paroxetine (PAXIL) 20 MG tablet      No current facility-administered medications for this visit.     Facility-Administered Medications Ordered in Other Visits   Medication    0.9%  NaCl infusion    celecoxib capsule 400 mg    fentaNYL 50 mcg/mL injection  mcg    LIDOcaine (PF) 10 mg/ml (1%) injection 10 mg    midazolam (VERSED) 1 mg/mL injection 0.5-4 mg       Review of Systems:  Review of Systems   Constitutional:  Negative for appetite change, fever and unexpected weight  change.   Respiratory:  Negative for shortness of breath.    Cardiovascular:  Negative for chest pain.   Gastrointestinal:  Negative for nausea and vomiting.   Genitourinary:  Negative for pelvic pain, vaginal bleeding, vaginal discharge, vaginal pain and postmenopausal bleeding.      OBJECTIVE:     Physical Exam:  Physical Exam  Vitals and nursing note reviewed.   Constitutional:       Appearance: She is well-developed.   Neck:      Thyroid: No thyromegaly.      Trachea: No tracheal deviation.   Cardiovascular:      Rate and Rhythm: Normal rate and regular rhythm.      Heart sounds: Normal heart sounds.   Pulmonary:      Effort: Pulmonary effort is normal.      Breath sounds: Normal breath sounds.   Chest:      Comments: Exam deferred  Abdominal:      Palpations: Abdomen is soft.   Genitourinary:     General: Normal vulva.      Labia:         Right: No rash, tenderness, lesion or injury.         Left: No rash, tenderness, lesion or injury.       Urethra: No prolapse, urethral pain, urethral swelling or urethral lesion.      Vagina: Normal. No signs of injury and foreign body. No vaginal discharge, erythema, tenderness or bleeding.      Cervix: No cervical motion tenderness, discharge or friability.      Uterus: Not deviated, not enlarged, not fixed and not tender.       Adnexa:         Right: No mass, tenderness or fullness.          Left: No mass, tenderness or fullness.        Rectum: No anal fissure or external hemorrhoid.      Comments: Urethral meatus: normal size, anterior vaginal wall with no prolapse, no lesions  Bladder: no fullness, masses or tenderness  Musculoskeletal:      Cervical back: Normal range of motion and neck supple.   Neurological:      Mental Status: She is alert and oriented to person, place, and time.   Psychiatric:         Behavior: Behavior normal.         Thought Content: Thought content normal.         Judgment: Judgment normal.       Chaperoned by: Mayela    ASSESSMENT:        ICD-10-CM ICD-9-CM    1. Well woman exam with routine gynecological exam  Z01.419 V72.31                Plan:      Cara was seen today for well woman.    Diagnoses and all orders for this visit:    Well woman exam with routine gynecological exam          No orders of the defined types were placed in this encounter.      Well Woman:   - Pap smear: up to date, no longer needed  - Mammogram: not indicated  - Colonoscopy: up to date  - Dexa: up to date per patient  - Immunizations: not discussed  - Labs: with pcp  - Exercise recommended    Follow up in two years for annual, or prn.    Natasha Cruz

## 2023-08-15 ENCOUNTER — PATIENT MESSAGE (OUTPATIENT)
Dept: HEMATOLOGY/ONCOLOGY | Facility: CLINIC | Age: 68
End: 2023-08-15
Payer: MEDICARE

## 2023-08-15 ENCOUNTER — HOSPITAL ENCOUNTER (OUTPATIENT)
Dept: RADIOLOGY | Facility: HOSPITAL | Age: 68
Discharge: HOME | End: 2023-08-15
Attending: INTERNAL MEDICINE
Payer: MEDICARE

## 2023-08-15 DIAGNOSIS — R91.1 SOLITARY PULMONARY NODULE: ICD-10-CM

## 2023-08-15 PROCEDURE — 71250 CT THORAX DX C-: CPT | Mod: TC

## 2023-08-16 ENCOUNTER — OFFICE VISIT (OUTPATIENT)
Dept: HEMATOLOGY/ONCOLOGY | Facility: CLINIC | Age: 68
End: 2023-08-16
Payer: MEDICARE

## 2023-08-16 VITALS
DIASTOLIC BLOOD PRESSURE: 73 MMHG | BODY MASS INDEX: 28.22 KG/M2 | HEART RATE: 81 BPM | SYSTOLIC BLOOD PRESSURE: 138 MMHG | OXYGEN SATURATION: 97 % | WEIGHT: 175.63 LBS | TEMPERATURE: 98 F | HEIGHT: 66 IN | RESPIRATION RATE: 18 BRPM

## 2023-08-16 DIAGNOSIS — C50.412 MALIGNANT NEOPLASM OF UPPER-OUTER QUADRANT OF LEFT BREAST IN FEMALE, ESTROGEN RECEPTOR POSITIVE: Primary | ICD-10-CM

## 2023-08-16 DIAGNOSIS — Z17.0 MALIGNANT NEOPLASM OF UPPER-OUTER QUADRANT OF LEFT BREAST IN FEMALE, ESTROGEN RECEPTOR POSITIVE: Primary | ICD-10-CM

## 2023-08-16 DIAGNOSIS — R91.1 PULMONARY NODULE: ICD-10-CM

## 2023-08-16 PROCEDURE — 3008F PR BODY MASS INDEX (BMI) DOCUMENTED: ICD-10-PCS | Mod: CPTII,S$GLB,, | Performed by: INTERNAL MEDICINE

## 2023-08-16 PROCEDURE — 1126F AMNT PAIN NOTED NONE PRSNT: CPT | Mod: CPTII,S$GLB,, | Performed by: INTERNAL MEDICINE

## 2023-08-16 PROCEDURE — 99213 OFFICE O/P EST LOW 20 MIN: CPT | Mod: S$GLB,,, | Performed by: INTERNAL MEDICINE

## 2023-08-16 PROCEDURE — 99213 PR OFFICE/OUTPT VISIT, EST, LEVL III, 20-29 MIN: ICD-10-PCS | Mod: S$GLB,,, | Performed by: INTERNAL MEDICINE

## 2023-08-16 PROCEDURE — 3075F SYST BP GE 130 - 139MM HG: CPT | Mod: CPTII,S$GLB,, | Performed by: INTERNAL MEDICINE

## 2023-08-16 PROCEDURE — 3078F DIAST BP <80 MM HG: CPT | Mod: CPTII,S$GLB,, | Performed by: INTERNAL MEDICINE

## 2023-08-16 PROCEDURE — 3288F PR FALLS RISK ASSESSMENT DOCUMENTED: ICD-10-PCS | Mod: CPTII,S$GLB,, | Performed by: INTERNAL MEDICINE

## 2023-08-16 PROCEDURE — 1101F PR PT FALLS ASSESS DOC 0-1 FALLS W/OUT INJ PAST YR: ICD-10-PCS | Mod: CPTII,S$GLB,, | Performed by: INTERNAL MEDICINE

## 2023-08-16 PROCEDURE — 3078F PR MOST RECENT DIASTOLIC BLOOD PRESSURE < 80 MM HG: ICD-10-PCS | Mod: CPTII,S$GLB,, | Performed by: INTERNAL MEDICINE

## 2023-08-16 PROCEDURE — 99999 PR PBB SHADOW E&M-EST. PATIENT-LVL III: ICD-10-PCS | Mod: PBBFAC,,, | Performed by: INTERNAL MEDICINE

## 2023-08-16 PROCEDURE — 1101F PT FALLS ASSESS-DOCD LE1/YR: CPT | Mod: CPTII,S$GLB,, | Performed by: INTERNAL MEDICINE

## 2023-08-16 PROCEDURE — 99999 PR PBB SHADOW E&M-EST. PATIENT-LVL III: CPT | Mod: PBBFAC,,, | Performed by: INTERNAL MEDICINE

## 2023-08-16 PROCEDURE — 1126F PR PAIN SEVERITY QUANTIFIED, NO PAIN PRESENT: ICD-10-PCS | Mod: CPTII,S$GLB,, | Performed by: INTERNAL MEDICINE

## 2023-08-16 PROCEDURE — 3288F FALL RISK ASSESSMENT DOCD: CPT | Mod: CPTII,S$GLB,, | Performed by: INTERNAL MEDICINE

## 2023-08-16 PROCEDURE — 3075F PR MOST RECENT SYSTOLIC BLOOD PRESS GE 130-139MM HG: ICD-10-PCS | Mod: CPTII,S$GLB,, | Performed by: INTERNAL MEDICINE

## 2023-08-16 PROCEDURE — 1159F MED LIST DOCD IN RCRD: CPT | Mod: CPTII,S$GLB,, | Performed by: INTERNAL MEDICINE

## 2023-08-16 PROCEDURE — 3008F BODY MASS INDEX DOCD: CPT | Mod: CPTII,S$GLB,, | Performed by: INTERNAL MEDICINE

## 2023-08-16 PROCEDURE — 1159F PR MEDICATION LIST DOCUMENTED IN MEDICAL RECORD: ICD-10-PCS | Mod: CPTII,S$GLB,, | Performed by: INTERNAL MEDICINE

## 2023-08-16 NOTE — PROGRESS NOTES
Subjective:       Patient ID: Cara Sharp is a 67 y.o. female.    Chief Complaint: No chief complaint on file.      HPI 67-year-old female seen in Oncology Clinic for follow-up of a diagnosis of left breast cancer.She is on adjuvant letrozole.  She is a patient of .   She is also followed for an incidental finding of a pulmonary nodule.    Today she reports that she is feeling well. She is not exercising.Was taking care of 4 grandkids for a week -really enjoyed that!      F/U CT 8/15/23 - Left lower lobe solid nodule measures 13 x 12 mm, previously 13 x 9 mm.  Stable pulmonary micro nodules bilaterally.  No new or enlarging nodules.    Upper Abdomen: Stable appearance of subcentimeter hepatic and splenic hypodensities, too small to characterize but favored to represent cysts.     Bones: Similar size and appearance of sclerotic lesion within the sternum, measuring 1.5 cm.  Stable T4 sclerotic focus, likely bone island.      Breast cancer history:  Mammogram on October 12, 2020 demonstrated an irregular mass in the upper outer quadrant left breast measuring 1.4 cm. Ultrasound showed a 1.2 cm hypoechoic mass.    On October 25, 2021 biopsy was performed which showed infiltrating ductal carcinoma with lobular features with some associated intermediate grade DCIS (histologic grade 3, nuclear grade 2, mitotic index 1).  Tumor cells were 96% ER positive, 95% ME positive, HER2 was 1+ and Ki-67 was 9%.  Largest focus was 10 mm.      On 2/7/22 she underwent bilateral nipple sparing mastectomy with autologous reconstruction - KOKO flaps  ([facility] - Dr. Butcher)  The pathology from that surgery showed 2 mm of residual invasive cancer in the left breast with 3 negative sentinel lymph nodes.T1bNo.  The right breast showed no significant abnormality.    Oncotype score was 6.    Letrozole started March 2022.    BMD normal in March 2022.        Pre surgical CT of the abdomen and pelvis on 2/3/22  showed a 9 mm  left hepatic density and a 1 cm LLL pulmonary nodule.    CT 2/10/23 - Lungs/Pleura: Prominent bandlike opacities within the bilateral lower lobes suggestive of scarring or atelectasis.  Along the periphery of the left lower lobe and there is a 1.3 x 0.9 cm nodule.  No other concerning nodules within the lungs.     Hilum/Mediastinum: no hilar or mediastinal lymph node enlargement.   Liver: The liver is normal in size and attenuation.  Subcentimeter hypodensity in the left hepatic lobe likely cyst.  No evidence of concerning hepatic lesions.   Lymph Nodes: Borderline size aortocaval lymph node up to 10 mm in the short axis (3-84)..     Bones: 1.4 cm mixed lucent and sclerotic lesion within the sternum.  Additional punctate focus of sclerosis within the spinous process of T4, likely bone island.  No evidence of other suspicious lesions throughout the bones.      Reviewed CTs - Feb 2022 and Feb 2023.  LLL nodule - unchanged.  The sternal changes were not seen on CT(did not cover that area) but may have been there on MRI from December 2021.     Review of Systems   Constitutional:  Negative for activity change, appetite change, fever and unexpected weight change.   HENT:  Positive for hearing loss. Negative for mouth sores.    Eyes:  Negative for visual disturbance.   Respiratory:  Negative for cough and shortness of breath.    Cardiovascular:  Negative for chest pain.   Gastrointestinal:  Negative for abdominal pain and diarrhea.   Genitourinary: Negative.  Negative for frequency.   Musculoskeletal:  Negative for back pain.   Integumentary:  Negative for rash.   Neurological:  Negative for headaches.   Hematological:  Negative for adenopathy.   Psychiatric/Behavioral:  The patient is not nervous/anxious.          Objective:      Physical Exam  Vitals reviewed.   Constitutional:       General: She is not in acute distress.  HENT:      Head: Normocephalic.      Mouth/Throat:      Mouth: Mucous membranes are moist.       Pharynx: Oropharynx is clear. No oropharyngeal exudate or posterior oropharyngeal erythema.   Eyes:      General: No scleral icterus.  Cardiovascular:      Rate and Rhythm: Normal rate and regular rhythm.   Pulmonary:      Effort: Pulmonary effort is normal. No respiratory distress.      Breath sounds: Normal breath sounds. No wheezing or rales.   Chest:       Abdominal:      Palpations: Abdomen is soft. There is no mass.      Tenderness: There is no abdominal tenderness.   Lymphadenopathy:      Cervical: No cervical adenopathy.      Upper Body:      Right upper body: No supraclavicular or axillary adenopathy.      Left upper body: No supraclavicular or axillary adenopathy.   Skin:     Findings: No rash.   Neurological:      Mental Status: She is alert and oriented to person, place, and time.   Psychiatric:         Mood and Affect: Mood normal.         Behavior: Behavior normal.         Thought Content: Thought content normal.         Judgment: Judgment normal.       Assessment:       Problem List Items Addressed This Visit       Malignant neoplasm of upper-outer quadrant of left breast in female, estrogen receptor positive - Primary    Pulmonary nodule       Plan:     Continue letrozole, encouraged her to exercise.  RTC 3 M   CT chest - 6 months           Route Chart for Scheduling    Med Onc Chart Routing      Follow up with physician 3 months.   Follow up with [facility]    Infusion scheduling note    Injection scheduling note    Labs None   Scheduling:  Preferred lab:  Lab interval:     Imaging None      Pharmacy appointment    Other referrals     No additional referrals needed

## 2023-09-27 ENCOUNTER — OFFICE VISIT (OUTPATIENT)
Dept: PODIATRY | Facility: CLINIC | Age: 68
End: 2023-09-27
Payer: MEDICARE

## 2023-09-27 VITALS
HEIGHT: 66 IN | HEART RATE: 109 BPM | SYSTOLIC BLOOD PRESSURE: 154 MMHG | DIASTOLIC BLOOD PRESSURE: 64 MMHG | BODY MASS INDEX: 28.12 KG/M2 | WEIGHT: 175 LBS

## 2023-09-27 DIAGNOSIS — B35.1 ONYCHOMYCOSIS DUE TO DERMATOPHYTE: Primary | ICD-10-CM

## 2023-09-27 PROCEDURE — 1159F MED LIST DOCD IN RCRD: CPT | Mod: CPTII,S$GLB,, | Performed by: PODIATRIST

## 2023-09-27 PROCEDURE — 3078F PR MOST RECENT DIASTOLIC BLOOD PRESSURE < 80 MM HG: ICD-10-PCS | Mod: CPTII,S$GLB,, | Performed by: PODIATRIST

## 2023-09-27 PROCEDURE — 3288F PR FALLS RISK ASSESSMENT DOCUMENTED: ICD-10-PCS | Mod: CPTII,S$GLB,, | Performed by: PODIATRIST

## 2023-09-27 PROCEDURE — 1126F AMNT PAIN NOTED NONE PRSNT: CPT | Mod: CPTII,S$GLB,, | Performed by: PODIATRIST

## 2023-09-27 PROCEDURE — 1101F PR PT FALLS ASSESS DOC 0-1 FALLS W/OUT INJ PAST YR: ICD-10-PCS | Mod: CPTII,S$GLB,, | Performed by: PODIATRIST

## 2023-09-27 PROCEDURE — 3008F BODY MASS INDEX DOCD: CPT | Mod: CPTII,S$GLB,, | Performed by: PODIATRIST

## 2023-09-27 PROCEDURE — 1159F PR MEDICATION LIST DOCUMENTED IN MEDICAL RECORD: ICD-10-PCS | Mod: CPTII,S$GLB,, | Performed by: PODIATRIST

## 2023-09-27 PROCEDURE — 3077F SYST BP >= 140 MM HG: CPT | Mod: CPTII,S$GLB,, | Performed by: PODIATRIST

## 2023-09-27 PROCEDURE — 3077F PR MOST RECENT SYSTOLIC BLOOD PRESSURE >= 140 MM HG: ICD-10-PCS | Mod: CPTII,S$GLB,, | Performed by: PODIATRIST

## 2023-09-27 PROCEDURE — 3288F FALL RISK ASSESSMENT DOCD: CPT | Mod: CPTII,S$GLB,, | Performed by: PODIATRIST

## 2023-09-27 PROCEDURE — 1101F PT FALLS ASSESS-DOCD LE1/YR: CPT | Mod: CPTII,S$GLB,, | Performed by: PODIATRIST

## 2023-09-27 PROCEDURE — 99203 PR OFFICE/OUTPT VISIT, NEW, LEVL III, 30-44 MIN: ICD-10-PCS | Mod: S$GLB,,, | Performed by: PODIATRIST

## 2023-09-27 PROCEDURE — 3078F DIAST BP <80 MM HG: CPT | Mod: CPTII,S$GLB,, | Performed by: PODIATRIST

## 2023-09-27 PROCEDURE — 1160F PR REVIEW ALL MEDS BY PRESCRIBER/CLIN PHARMACIST DOCUMENTED: ICD-10-PCS | Mod: CPTII,S$GLB,, | Performed by: PODIATRIST

## 2023-09-27 PROCEDURE — 99999 PR PBB SHADOW E&M-EST. PATIENT-LVL III: ICD-10-PCS | Mod: PBBFAC,,, | Performed by: PODIATRIST

## 2023-09-27 PROCEDURE — 99999 PR PBB SHADOW E&M-EST. PATIENT-LVL III: CPT | Mod: PBBFAC,,, | Performed by: PODIATRIST

## 2023-09-27 PROCEDURE — 1126F PR PAIN SEVERITY QUANTIFIED, NO PAIN PRESENT: ICD-10-PCS | Mod: CPTII,S$GLB,, | Performed by: PODIATRIST

## 2023-09-27 PROCEDURE — 3008F PR BODY MASS INDEX (BMI) DOCUMENTED: ICD-10-PCS | Mod: CPTII,S$GLB,, | Performed by: PODIATRIST

## 2023-09-27 PROCEDURE — 99203 OFFICE O/P NEW LOW 30 MIN: CPT | Mod: S$GLB,,, | Performed by: PODIATRIST

## 2023-09-27 PROCEDURE — 1160F RVW MEDS BY RX/DR IN RCRD: CPT | Mod: CPTII,S$GLB,, | Performed by: PODIATRIST

## 2023-09-27 RX ORDER — PAROXETINE HYDROCHLORIDE 20 MG/1
1 TABLET, FILM COATED ORAL EVERY MORNING
COMMUNITY
Start: 2023-06-02 | End: 2024-03-14 | Stop reason: ALTCHOICE

## 2023-09-27 RX ORDER — CICLOPIROX 80 MG/ML
SOLUTION TOPICAL NIGHTLY
Qty: 6.6 ML | Refills: 1 | Status: SHIPPED | OUTPATIENT
Start: 2023-09-27

## 2023-09-27 RX ORDER — ATORVASTATIN CALCIUM 40 MG/1
1 TABLET, FILM COATED ORAL NIGHTLY
COMMUNITY
Start: 2023-08-22

## 2023-09-27 NOTE — PROGRESS NOTES
Subjective:      Patient ID: Cara Sharp is a 67 y.o. female.    Chief Complaint:   Nail Problem (Nail fungus left great toe )    Cara is a 67 y.o. female who presents to the clinic complaining of thick and discolored toenails on the left foot. Cara is inquiring about treatment options.    Relates no pain today denies injury  She is had pedicures once in awhile every few months  She noticed some changes to her left big toenail few years ago  Does not use any topical    She also has custom inserts from a podiatrist maybe 5 years ago and she is wondering if she should get a new pair if we make them here     Past Medical History:   Diagnosis Date    H/O mammogram 11/16/2017    Birads 2/B  @ [facility]     Hearing impaired     History of bone density study 05/09/2018    Spine T-score: 0.9   //   Rt Hip T-score:  0.0   @ [facility]     Hyperlipidemia     Menopause 2009    Occult blood positive stool     Pap smear for cervical cancer screening 08/31/2015    Negative/ Hpv Neg    Relies on partner vasectomy for contraception     Thyroid disease     nodules     Past Surgical History:   Procedure Laterality Date    CARPAL TUNNEL RELEASE Left 1997    COLONOSCOPY  10/07/2020    Diverticulitis ( Rtn 5 yrs) Dr Ashish Leal    DILATION AND CURETTAGE OF UTERUS  1985    LAPAROSCOPY FOR ENDOMETRIOSIS    INJECTION FOR SENTINEL NODE IDENTIFICATION Left 12/3/2021    Procedure: INJECTION, FOR SENTINEL NODE IDENTIFICATION-Left;  Surgeon: ERWIN Chi MD;  Location: [facility] OR;  Service: General;  Laterality: Left;    MASTECTOMY, PARTIAL Left 12/3/2021    Procedure: MASTECTOMY, PARTIAL-Left with radiological marker;  Surgeon: ERWIN Chi MD;  Location: [facility] OR;  Service: General;  Laterality: Left;    SENTINEL LYMPH NODE BIOPSY Left 12/3/2021    Procedure: BIOPSY, LYMPH NODE, SENTINEL-Left;  Surgeon: ERWIN Chi MD;  Location: [facility] OR;  Service: General;  Laterality: Left;    THYROIDECTOMY, PARTIAL  2011    Nodules, Dr Blackwell ([facility])     TONSILLECTOMY  1977     Current Outpatient Medications on File Prior to Visit   Medication Sig Dispense Refill    atorvastatin (LIPITOR) 40 MG tablet Take 1 tablet by mouth every evening.      doxepin (SINEQUAN) 10 MG capsule Take 1 capsule (10 mg total) by mouth every evening. 30 capsule 11    ergocalciferol, vitamin D2, (VITAMIN D ORAL) Take by mouth.      letrozole (FEMARA) 2.5 mg Tab TAKE 1 TABLET EVERY DAY 90 tablet 3    LETROZOLE ORAL       levothyroxine (SYNTHROID) 88 MCG tablet       paroxetine (PAXIL) 20 MG tablet       atorvastatin (LIPITOR) 40 MG tablet Take 40 mg by mouth once daily.      paroxetine (PAXIL) 20 MG tablet Take 1 tablet by mouth every morning.       Current Facility-Administered Medications on File Prior to Visit   Medication Dose Route Frequency Provider Last Rate Last Admin    0.9%  NaCl infusion   Intravenous Continuous Alongi, Shelbie MARINO MD 70 mL/hr at 12/03/21 0630 New Bag at 12/03/21 0630    celecoxib capsule 400 mg  400 mg Oral Once Yonas Werner MD        fentaNYL 50 mcg/mL injection  mcg   mcg Intravenous PRN Yonas Werner MD   100 mcg at 12/03/21 0650    LIDOcaine (PF) 10 mg/ml (1%) injection 10 mg  1 mL Intradermal Once Yonas Werner MD        midazolam (VERSED) 1 mg/mL injection 0.5-4 mg  0.5-4 mg Intravenous PRN Yonas Werner MD   2 mg at 12/03/21 0650     Review of patient's allergies indicates:   Allergen Reactions    Ibuprofen Hives and Shortness Of Breath    Naproxen Hives, Shortness Of Breath and Rash       Review of Systems   Constitutional: Negative for chills, decreased appetite, fever, malaise/fatigue, night sweats, weight gain and weight loss.   HENT:  Positive for hearing loss.    Cardiovascular:  Negative for chest pain, claudication, dyspnea on exertion, leg swelling, palpitations and syncope.   Respiratory:  Negative for cough and shortness of breath.    Endocrine: Negative for cold intolerance and heat intolerance.  "  Hematologic/Lymphatic: Negative for bleeding problem. Does not bruise/bleed easily.   Skin:  Positive for nail changes. Negative for color change, dry skin, flushing, itching, poor wound healing, rash, skin cancer, suspicious lesions and unusual hair distribution.   Musculoskeletal:  Negative for arthritis, back pain, falls, gout, joint pain, joint swelling, muscle cramps, muscle weakness, myalgias, neck pain and stiffness.   Gastrointestinal:  Negative for diarrhea, nausea and vomiting.   Neurological:  Negative for dizziness, focal weakness, light-headedness, numbness, paresthesias, tremors, vertigo and weakness.   Psychiatric/Behavioral:  Negative for altered mental status and depression. The patient does not have insomnia.    Allergic/Immunologic: Negative.            Objective:       Vitals:    09/27/23 1311   BP: (!) 154/64   Pulse: 109   Weight: 79.4 kg (175 lb)   Height: 5' 6" (1.676 m)   PainSc: 0-No pain   79.4 kg (175 lb)     Physical Exam  Vitals reviewed.   Constitutional:       General: She is not in acute distress.     Appearance: She is well-developed. She is not ill-appearing, toxic-appearing or diaphoretic.      Comments:    Well worn custom orthotics   Cardiovascular:      Pulses:           Dorsalis pedis pulses are 2+ on the right side and 2+ on the left side.        Posterior tibial pulses are 2+ on the right side and 2+ on the left side.   Musculoskeletal:         General: No tenderness or deformity.      Right lower leg: No edema.      Left lower leg: No edema.      Right ankle: Normal.      Right Achilles Tendon: Normal.      Left ankle: Normal.      Left Achilles Tendon: Normal.      Right foot: Decreased range of motion. No deformity.      Left foot: Decreased range of motion. No deformity.      Comments: No pain on palpation or with nail debridement  Cavus foot type   Feet:      Right foot:      Protective Sensation: 10 sites tested.  10 sites sensed.      Skin integrity: Dry skin " present. No ulcer, blister, skin breakdown, erythema, warmth or callus.      Toenail Condition: Right toenails are normal.      Left foot:      Protective Sensation: 10 sites tested.  10 sites sensed.      Skin integrity: Dry skin present. No ulcer, blister, skin breakdown, erythema, warmth or callus.      Toenail Condition: Left toenails are abnormally thick. Fungal disease present.     Comments: Left hallux nail thickened discolored distally proximal clearing no signs of infection other nails thinner clear  Skin:     General: Skin is warm and dry.      Capillary Refill: Capillary refill takes 2 to 3 seconds.      Coloration: Skin is not pale.      Findings: No erythema or rash.   Neurological:      Mental Status: She is alert and oriented to person, place, and time.      Sensory: No sensory deficit.      Gait: Gait is intact.   Psychiatric:         Attention and Perception: Attention normal.         Mood and Affect: Mood normal.         Speech: Speech normal.         Behavior: Behavior normal.         Thought Content: Thought content normal.         Cognition and Memory: Cognition normal.         Judgment: Judgment normal.               Assessment:       Encounter Diagnosis   Name Primary?    Onychomycosis due to dermatophyte Yes         Plan:       Cara was seen today for nail problem.    Diagnoses and all orders for this visit:    Onychomycosis due to dermatophyte    Other orders  -     ciclopirox (PENLAC) 8 % Soln; Apply topically nightly. Remove once a day with nail polish remover      I counseled the patient on her conditions, their implications and medical management.    Pedicure precautions/avoidance    Discussed with patient recommend Penlac  Discuss treatment options for nail fungus.  I explained that fungus lives in a warm dark moist environment and therefore patient should make every attempt to keep feet clean and dry.  We discussed drying feet thoroughly after shower particularly between the toes and  then applying powder between the toes and in the shoes.   For fungal toenails I prescribed Penlac to be used daily for up to a year.  We discussed oral Lamisil but I did not recommend it as a first line of treatment since it is an internal medicine that may potentially have side effects, including liver problems. Patient elects for topical treatment. Patient instructed on proper use of Penlac.     - With patient's permission, nails were aggressively reduced and debrided x 1 to their soft tissue attachment mechanically, removing all offending nail and debris. Patient relates relief following the procedure. She will continue to monitor the areas daily, inspect her feet, wear protective shoe gear when ambulatory, moisturizer to maintain skin integrity and follow in this office in approximately 2-3 months, sooner p.r.n.    Monitor new nail growth    Recommend Matt to see if current pair of custom-molded orthotics can be refurbished            Follow up in about 3 months (around 12/27/2023).

## 2023-11-06 DIAGNOSIS — E04.2 NONTOXIC MULTINODULAR GOITER: Primary | ICD-10-CM

## 2023-11-13 ENCOUNTER — HOSPITAL ENCOUNTER (OUTPATIENT)
Dept: RADIOLOGY | Facility: HOSPITAL | Age: 68
Discharge: HOME | End: 2023-11-13
Attending: SPECIALIST
Payer: MEDICARE

## 2023-11-13 DIAGNOSIS — E04.2 NONTOXIC MULTINODULAR GOITER: ICD-10-CM

## 2023-11-13 PROCEDURE — 76536 US EXAM OF HEAD AND NECK: CPT | Mod: TC

## 2023-11-13 PROCEDURE — 76536 US EXAM OF HEAD AND NECK: CPT | Mod: 26,,, | Performed by: RADIOLOGY

## 2023-11-13 PROCEDURE — 76536 US SOFT TISSUE HEAD NECK THYROID: ICD-10-PCS | Mod: 26,,, | Performed by: RADIOLOGY

## 2023-11-28 ENCOUNTER — OFFICE VISIT (OUTPATIENT)
Dept: HEMATOLOGY/ONCOLOGY | Facility: CLINIC | Age: 68
End: 2023-11-28
Payer: MEDICARE

## 2023-11-28 VITALS
OXYGEN SATURATION: 96 % | WEIGHT: 173.94 LBS | DIASTOLIC BLOOD PRESSURE: 75 MMHG | HEIGHT: 65 IN | BODY MASS INDEX: 28.98 KG/M2 | SYSTOLIC BLOOD PRESSURE: 165 MMHG | TEMPERATURE: 98 F | RESPIRATION RATE: 16 BRPM | HEART RATE: 77 BPM

## 2023-11-28 DIAGNOSIS — R91.1 SOLITARY PULMONARY NODULE: ICD-10-CM

## 2023-11-28 DIAGNOSIS — Z17.0 MALIGNANT NEOPLASM OF UPPER-OUTER QUADRANT OF LEFT BREAST IN FEMALE, ESTROGEN RECEPTOR POSITIVE: Primary | ICD-10-CM

## 2023-11-28 DIAGNOSIS — G89.29 CHRONIC SI JOINT PAIN: ICD-10-CM

## 2023-11-28 DIAGNOSIS — R91.1 PULMONARY NODULE: ICD-10-CM

## 2023-11-28 DIAGNOSIS — M53.3 CHRONIC SI JOINT PAIN: ICD-10-CM

## 2023-11-28 DIAGNOSIS — C50.412 MALIGNANT NEOPLASM OF UPPER-OUTER QUADRANT OF LEFT BREAST IN FEMALE, ESTROGEN RECEPTOR POSITIVE: Primary | ICD-10-CM

## 2023-11-28 PROCEDURE — 1101F PR PT FALLS ASSESS DOC 0-1 FALLS W/OUT INJ PAST YR: ICD-10-PCS | Mod: CPTII,S$GLB,, | Performed by: INTERNAL MEDICINE

## 2023-11-28 PROCEDURE — 3288F PR FALLS RISK ASSESSMENT DOCUMENTED: ICD-10-PCS | Mod: CPTII,S$GLB,, | Performed by: INTERNAL MEDICINE

## 2023-11-28 PROCEDURE — 99213 PR OFFICE/OUTPT VISIT, EST, LEVL III, 20-29 MIN: ICD-10-PCS | Mod: S$GLB,,, | Performed by: INTERNAL MEDICINE

## 2023-11-28 PROCEDURE — 3008F BODY MASS INDEX DOCD: CPT | Mod: CPTII,S$GLB,, | Performed by: INTERNAL MEDICINE

## 2023-11-28 PROCEDURE — 1159F MED LIST DOCD IN RCRD: CPT | Mod: CPTII,S$GLB,, | Performed by: INTERNAL MEDICINE

## 2023-11-28 PROCEDURE — 1101F PT FALLS ASSESS-DOCD LE1/YR: CPT | Mod: CPTII,S$GLB,, | Performed by: INTERNAL MEDICINE

## 2023-11-28 PROCEDURE — 99999 PR PBB SHADOW E&M-EST. PATIENT-LVL IV: CPT | Mod: PBBFAC,,, | Performed by: INTERNAL MEDICINE

## 2023-11-28 PROCEDURE — 1126F PR PAIN SEVERITY QUANTIFIED, NO PAIN PRESENT: ICD-10-PCS | Mod: CPTII,S$GLB,, | Performed by: INTERNAL MEDICINE

## 2023-11-28 PROCEDURE — 3077F SYST BP >= 140 MM HG: CPT | Mod: CPTII,S$GLB,, | Performed by: INTERNAL MEDICINE

## 2023-11-28 PROCEDURE — 99213 OFFICE O/P EST LOW 20 MIN: CPT | Mod: S$GLB,,, | Performed by: INTERNAL MEDICINE

## 2023-11-28 PROCEDURE — 99999 PR PBB SHADOW E&M-EST. PATIENT-LVL IV: ICD-10-PCS | Mod: PBBFAC,,, | Performed by: INTERNAL MEDICINE

## 2023-11-28 PROCEDURE — 3078F DIAST BP <80 MM HG: CPT | Mod: CPTII,S$GLB,, | Performed by: INTERNAL MEDICINE

## 2023-11-28 PROCEDURE — 3288F FALL RISK ASSESSMENT DOCD: CPT | Mod: CPTII,S$GLB,, | Performed by: INTERNAL MEDICINE

## 2023-11-28 PROCEDURE — 3078F PR MOST RECENT DIASTOLIC BLOOD PRESSURE < 80 MM HG: ICD-10-PCS | Mod: CPTII,S$GLB,, | Performed by: INTERNAL MEDICINE

## 2023-11-28 PROCEDURE — 1126F AMNT PAIN NOTED NONE PRSNT: CPT | Mod: CPTII,S$GLB,, | Performed by: INTERNAL MEDICINE

## 2023-11-28 PROCEDURE — 1159F PR MEDICATION LIST DOCUMENTED IN MEDICAL RECORD: ICD-10-PCS | Mod: CPTII,S$GLB,, | Performed by: INTERNAL MEDICINE

## 2023-11-28 PROCEDURE — 3077F PR MOST RECENT SYSTOLIC BLOOD PRESSURE >= 140 MM HG: ICD-10-PCS | Mod: CPTII,S$GLB,, | Performed by: INTERNAL MEDICINE

## 2023-11-28 PROCEDURE — 3008F PR BODY MASS INDEX (BMI) DOCUMENTED: ICD-10-PCS | Mod: CPTII,S$GLB,, | Performed by: INTERNAL MEDICINE

## 2023-11-28 NOTE — PROGRESS NOTES
Subjective:       Patient ID: Cara Sharp is a 68 y.o. female.    Chief Complaint: No chief complaint on file.      HPI 67-year-old female seen in Oncology Clinic for follow-up of a diagnosis of left breast cancer.She is on adjuvant letrozole.  She is a patient of .   She is also followed for an incidental finding of a pulmonary nodule.      She had a recent ultrasound which showed a stable 3 cm nodule.  She is due to have a needle aspiration done with her ENT doctor sometime later this year or early next year.  Her major complaint is some right sacroiliac pain when she climbs stairs.        F/U CT 8/15/23 - Left lower lobe solid nodule measures 13 x 12 mm, previously 13 x 9 mm.  Stable pulmonary micro nodules bilaterally.  No new or enlarging nodules.    Upper Abdomen: Stable appearance of subcentimeter hepatic and splenic hypodensities, too small to characterize but favored to represent cysts.     Bones: Similar size and appearance of sclerotic lesion within the sternum, measuring 1.5 cm.  Stable T4 sclerotic focus, likely bone island.      Breast cancer history:  Mammogram on October 12, 2020 demonstrated an irregular mass in the upper outer quadrant left breast measuring 1.4 cm. Ultrasound showed a 1.2 cm hypoechoic mass.    On October 25, 2021 biopsy was performed which showed infiltrating ductal carcinoma with lobular features with some associated intermediate grade DCIS (histologic grade 3, nuclear grade 2, mitotic index 1).  Tumor cells were 96% ER positive, 95% OR positive, HER2 was 1+ and Ki-67 was 9%.  Largest focus was 10 mm.      On 2/7/22 she underwent bilateral nipple sparing mastectomy with autologous reconstruction - KOKO flaps  ([facility] - Dr. Butcher)  The pathology from that surgery showed 2 mm of residual invasive cancer in the left breast with 3 negative sentinel lymph nodes.T1bNo.  The right breast showed no significant abnormality.    Oncotype score was 6.    Letrozole  started March 2022.    BMD normal in March 2022.        Pre surgical CT of the abdomen and pelvis on 2/3/22  showed a 9 mm left hepatic density and a 1 cm LLL pulmonary nodule.    CT 2/10/23 - Lungs/Pleura: Prominent bandlike opacities within the bilateral lower lobes suggestive of scarring or atelectasis.  Along the periphery of the left lower lobe and there is a 1.3 x 0.9 cm nodule.  No other concerning nodules within the lungs.     Hilum/Mediastinum: no hilar or mediastinal lymph node enlargement.   Liver: The liver is normal in size and attenuation.  Subcentimeter hypodensity in the left hepatic lobe likely cyst.  No evidence of concerning hepatic lesions.   Lymph Nodes: Borderline size aortocaval lymph node up to 10 mm in the short axis (3-84)..     Bones: 1.4 cm mixed lucent and sclerotic lesion within the sternum.  Additional punctate focus of sclerosis within the spinous process of T4, likely bone island.  No evidence of other suspicious lesions throughout the bones.      Reviewed CTs - Feb 2022 and Feb 2023.  LLL nodule - unchanged.  The sternal changes were not seen on CT(did not cover that area) but may have been there on MRI from December 2021.     Review of Systems   Constitutional:  Negative for activity change, appetite change, fever and unexpected weight change.   HENT:  Positive for hearing loss. Negative for mouth sores.    Eyes:  Negative for visual disturbance.   Respiratory:  Negative for cough and shortness of breath.    Cardiovascular:  Negative for chest pain.   Gastrointestinal:  Negative for abdominal pain and diarrhea.   Genitourinary: Negative.  Negative for frequency.   Musculoskeletal:  Negative for back pain.        Right SI joint pain   Integumentary:  Negative for rash.   Neurological:  Negative for headaches.   Hematological:  Negative for adenopathy.   Psychiatric/Behavioral:  The patient is not nervous/anxious.          Objective:      Physical Exam  Vitals reviewed.    Constitutional:       General: She is not in acute distress.  HENT:      Head: Normocephalic.      Mouth/Throat:      Mouth: Mucous membranes are moist.      Pharynx: Oropharynx is clear. No oropharyngeal exudate or posterior oropharyngeal erythema.   Eyes:      General: No scleral icterus.  Cardiovascular:      Rate and Rhythm: Normal rate and regular rhythm.   Pulmonary:      Effort: Pulmonary effort is normal. No respiratory distress.      Breath sounds: Normal breath sounds. No wheezing or rales.   Chest:       Abdominal:      Palpations: Abdomen is soft. There is no mass.      Tenderness: There is no abdominal tenderness.   Lymphadenopathy:      Cervical: No cervical adenopathy.      Upper Body:      Right upper body: No supraclavicular or axillary adenopathy.      Left upper body: No supraclavicular or axillary adenopathy.   Skin:     Findings: No rash.   Neurological:      Mental Status: She is alert and oriented to person, place, and time.   Psychiatric:         Mood and Affect: Mood normal.         Behavior: Behavior normal.         Thought Content: Thought content normal.         Judgment: Judgment normal.         Assessment:       Problem List Items Addressed This Visit       Malignant neoplasm of upper-outer quadrant of left breast in female, estrogen receptor positive - Primary       Plan:     Continue letrozole.  RTC 3 M ;CT chest - 3 months    Will start with physical therapy for her SI joint pain and referred to orthopedics if that is not successful.       Route Chart for Scheduling    Med Onc Chart Routing      Follow up with physician 3 months.   Follow up with [facility]    Infusion scheduling note    Injection scheduling note    Labs None   Scheduling:  Preferred lab:  Lab interval:     Imaging CT chest abdomen pelvis   3 M   Pharmacy appointment No pharmacy appointment needed      Other referrals no referral to Oncology Primary Care needed -  no Massage appointment needed    No additional referrals  needed

## 2023-12-05 ENCOUNTER — CLINICAL SUPPORT (OUTPATIENT)
Dept: REHABILITATION | Facility: HOSPITAL | Age: 68
End: 2023-12-05
Attending: INTERNAL MEDICINE
Payer: MEDICARE

## 2023-12-05 DIAGNOSIS — M53.80 DECREASED RANGE OF MOTION OF SPINE: Primary | ICD-10-CM

## 2023-12-05 DIAGNOSIS — R25.9 DYSFUNCTIONAL MOVEMENTS: ICD-10-CM

## 2023-12-05 DIAGNOSIS — M62.81 WEAKNESS OF TRUNK MUSCULATURE: ICD-10-CM

## 2023-12-05 DIAGNOSIS — Z74.09 IMPAIRED FUNCTIONAL MOBILITY AND ACTIVITY TOLERANCE: ICD-10-CM

## 2023-12-05 DIAGNOSIS — R29.3 POOR POSTURE: ICD-10-CM

## 2023-12-05 DIAGNOSIS — G89.29 CHRONIC SI JOINT PAIN: ICD-10-CM

## 2023-12-05 DIAGNOSIS — M53.3 CHRONIC SI JOINT PAIN: ICD-10-CM

## 2023-12-05 PROCEDURE — 97161 PT EVAL LOW COMPLEX 20 MIN: CPT | Mod: PO | Performed by: PHYSICAL THERAPIST

## 2023-12-05 NOTE — PROGRESS NOTES
[facility]   Physical Therapy Initial Evaluation     Date: 12/5/2023   Name: Cara Sharp  Clinic Number: [id number]    Therapy Diagnosis:   Encounter Diagnoses   Name Primary?    Chronic SI joint pain     Decreased range of motion of spine Yes    Poor posture     Impaired functional mobility and activity tolerance     Weakness of trunk musculature     Dysfunctional movements      Physician: Jose Bean MD    Physician Orders: PT Eval and Treat low back  Medical Diagnosis from Referral:   Chronic SI joint pain [M53.3, G89.29]   Evaluation Date: 12/5/2023  Authorization Period Expiration: 12/31/2023  Plan of Care Expiration: 3/5/2024  Progress Note Due: 1/5/2024  Visit # / Visits authorized: 1/ 1   FOTO: 0/ 3     Precautions: Standard    Time In: 1505  Time Out: 1610  Total Appointment Time (timed & untimed codes): 65 minutes      SUBJECTIVE       History of current condition: Cara reports intermittent dull pain in the RLE along with intermittent right buttocks pain. Pain in the right SI area is intermittent.       Date of onset: ongoing for about one year characterized by remissions and exacerbations. Gradual in appearance. Generally worse since onset.   Seemed to exacerbate last week while standing on a ladder decorating.   Falls: 0   Pain:  Current 5/10, worst 5/10, best 0/10   Location: right thigh and anterior leg, right buttocks   Description: Dull and Burning  Aggravating Factors: Walking  Easing Factors: sitting down resting   Sleep: not disturbed.     Current Level of Function:   Personal - not limited   Domestic - not limited   Community - not limited   Occupation - NA   Sports/recreation/fitness - does not participate   Prior Level of Function: more active   Prior Therapy: no   Social History:   lives with their spouse in a single family home. Indoor stairs two flights.   Occupation: not employed.    Patients goals: to learn to manage the problem at home and improve  walking.   Imaging, none:           Medical History:   Past Medical History:   Diagnosis Date    H/O mammogram 11/16/2017    Birads 2/B  @ [facility]     Hearing impaired     History of bone density study 05/09/2018    Spine T-score: 0.9   //   Rt Hip T-score:  0.0   @ [facility]     Hyperlipidemia     Menopause 2009    Occult blood positive stool     Pap smear for cervical cancer screening 08/31/2015    Negative/ Hpv Neg    Relies on partner vasectomy for contraception     Thyroid disease     nodules       Surgical History:   Cara Sharp  has a past surgical history that includes Carpal tunnel release (Left, 1997); Tonsillectomy (1977); Colonoscopy (10/07/2020); Thyroidectomy, partial (2011); Dilation and curettage of uterus (1985); Mastectomy, partial (Left, 12/3/2021); [place] lymph node biopsy (Left, 12/3/2021); and Injection for sentinel node identification (Left, 12/3/2021).    Medications:   Cara has a current medication list which includes the following prescription(s): atorvastatin, atorvastatin, ciclopirox, doxepin, ergocalciferol (vitamin d2), letrozole, letrozole, levothyroxine, paroxetine, and paroxetine, and the following Facility-Administered Medications: sodium chloride 0.9%, celecoxib, fentanyl, lidocaine (pf) 10 mg/ml (1%), and midazolam.    Allergies:   Review of patient's allergies indicates:   Allergen Reactions    Ibuprofen Hives and Shortness Of Breath    Naproxen Hives, Shortness Of Breath and Rash          OBJECTIVE       Posture Alignment: right lateral shift with the left shoulder high. Stands with the Right  knee partially flexed. Pelvic levels equal, PSIS equal.   Sensation: Light Touch: Intact  Palpation: tender / pain right mid gluteal and posterior to the greater trochanter.       Lumbar/Thoracic AROM: Pain/Dysfunction with Movement:   Flexion        120 / 80      = 40 FP   Extension      20 / 10       = 10 DP   Right side bending         20 NP   Left side bending            30 NP   Right  rotation           50% DN   Left rotation              50%  DN       LOWER EXTREMITY STRENGTH:   Left  5/5 Right  5/5           DTR's:   Left Right   Patella Tendon 2+ 2+   Achilles Tendon 2+ 2+           Selective Functional Movement Assessment:  FN: functional, non-painful  FP: functional, painful  DP: dysfunctional, painful  DN: dysfunctional, non-painful      Multi-Segmental Flexion: see above   Multi-Segmental Extension: see above     Multi-Segmental Rotation:   right see above   left see above         FUNCTIONAL MOVEMENT BREAKOUTS:  Long sitting  = FN  SLR   -Active   right FN  left FN    Press up  - DP mid thoracic     Lumbar exten / rot  Active  R- DN  L - DN  Passive  R - DN  L - DN    Lumbar locked rotation   Active  R - DN  L - DN  Passive  R - DN  L - DN    Hip extension   Active  R - FN  L - FN  Passive   R - FN  L - FN     Special Tests  CAN  negative for pain but  dysfunctional / no pain   Stabilized CAN - positive  for core stability and motor control dysfunction  Gaenslens - negative   Compression - negative  Distraction - negative   Thigh thrust  - negative     GAIT: ambulates with no assistive device with independently.     GAIT DEVIATIONS: displays       Limitation/Restriction for FOTO SI / back Survey - not provided     Therapist reviewed FOTO scores for Cara Sharp on 12/5/2023.   FOTO documents entered into [employer] - see Media section.    Limitation Score: -%         TREATMENT     Total Treatment time (time-based codes) separate from Evaluation: - minutes      Cara received the treatments listed below:      therapeutic exercises to develop strength, ROM, and flexibility for - minutes including:  -    manual therapy techniques:  were applied to the: - for - minutes, including:  -    neuromuscular re-education activities to improve: Balance, Proprioception, and stability for - minutes. The following activities were included:  -    therapeutic activities to improve functional performance for  -  minutes, including:  -    gait training to improve functional mobility and safety for -  minutes, including:  -    PATIENT EDUCATION AND HOME EXERCISES     Education provided:   -Findings of evaluation and examination, and affect of these on plan for treatment  -Prognosis and expectations  -Home exercise program and expectations of therapy     Written Home Exercises Provided: no.     ASSESSMENT     Cara is a 68 y.o. female referred to outpatient Physical Therapy with a medical diagnosis of Chronic SI joint pain [M53.3, G89.29] . Patient presents with clinical findings of a core stability and motor control dysfunction, lumbar extension rotation and thoracic extension rotation mobility dysfunction. Clinical test signs are negative for SI dysfunction.   Patient prognosis is Good.   Patient will benefit from skilled outpatient Physical Therapy to address the deficits stated above and in the chart below, provide patient /family education, and to maximize patientt's level of independence.     Plan of care discussed with patient: Yes  Patient's spiritual, cultural and educational needs considered and patient is agreeable to the plan of care and goals as stated below:     Anticipated Barriers for therapy: none    Medical Necessity is demonstrated by the following  History  Co-morbidities and personal factors that may impact the plan of care [x] LOW: no personal factors / co-morbidities  [] MODERATE: 1-2 personal factors / co-morbidities  [] HIGH: 3+ personal factors / co-morbidities    Moderate / High Support Documentation:   Co-morbidities affecting plan of care: none    Personal Factors:   no deficits     Examination  Body Structures and Functions, activity limitations and participation restrictions that may impact the plan of care [x] LOW: addressing 1-2 elements  [] MODERATE: 3+ elements  [] HIGH: 4+ elements (please support below)    Moderate / High Support Documentation: -     Clinical Presentation [x] LOW:  stable  [] MODERATE: Evolving  [] HIGH: Unstable     Decision Making/ Complexity Score: low       Goals:    Goals to be met:    Short Term GOALS: 5 weeks. Pt agrees with goals set.  1. Pts pain intensity decreased 20-40% for improved quality of life and functional mobilty. ONGOING  2. Pt to demonstrate core activation with spinal stability during transitional movements for improved quality of movement. ONGOING  3. Patient demonstrates independence with HEP for self management . ONGOING  4. Patient demonstrates independence with Postural Awareness for control of pain.ONGOING  5. Pt demonstrates passive lumbar extension rotation and thoracic extension rotation to 30 and 50 degrees respectively to improve functional mobility. ONGOING    Long Term GOALS: 10 weeks. Pt agrees with goals set.  1. Patient demonstrates increased active lumbar and thoracic spine extension mobility to 30 and 50 degrees to improve functional mobility and tolerance to functional activities. ONGOING  2. Patient demonstrates increased BLE active SLR mobility to 70 degrees or greater to improve functional mobility and tolerance to functional activities. ONGOING  3. Patient demonstrates improved overall function and return demonstration to functional ADL and recreational lifestyle. ONGOING  4. Pt demonstrates  [place] with body mechanics to control episodes of pain associated with daily ADL and improve functional lifestyle. ONGOING  5. Pts pain level decreased to 75% or greater for with walking for restoring functional mobility and ADL. ONGOING     Plan     Plan of care Certification: 12/5/2023 to 3/5/2024.    Outpatient Physical Therapy 2 times weekly for 10 weeks to include the following interventions: Manual Therapy, Neuromuscular Re-ed, Patient Education, Therapeutic Activities, and Therapeutic Exercise.     Shoaib Bass, PT

## 2023-12-08 PROBLEM — M53.80 DECREASED RANGE OF MOTION OF SPINE: Status: ACTIVE | Noted: 2023-12-08

## 2023-12-08 PROBLEM — M62.81 WEAKNESS OF TRUNK MUSCULATURE: Status: ACTIVE | Noted: 2023-12-08

## 2023-12-08 PROBLEM — R25.9 DYSFUNCTIONAL MOVEMENTS: Status: ACTIVE | Noted: 2023-12-08

## 2023-12-08 NOTE — PLAN OF CARE
[facility]   Physical Therapy Initial Evaluation     Date: 12/5/2023   Name: Cara Sharp  Clinic Number: [id number]    Therapy Diagnosis:   Encounter Diagnoses   Name Primary?    Chronic SI joint pain     Decreased range of motion of spine Yes    Poor posture     Impaired functional mobility and activity tolerance     Weakness of trunk musculature     Dysfunctional movements      Physician: Jose Bean MD    Physician Orders: PT Eval and Treat low back  Medical Diagnosis from Referral:   Chronic SI joint pain [M53.3, G89.29]   Evaluation Date: 12/5/2023  Authorization Period Expiration: 12/31/2023  Plan of Care Expiration: 3/5/2024  Progress Note Due: 1/5/2024  Visit # / Visits authorized: 1/ 1   FOTO: 0/ 3     Precautions: Standard    Time In: 1505  Time Out: 1610  Total Appointment Time (timed & untimed codes): 65 minutes      SUBJECTIVE       History of current condition: Cara reports intermittent dull pain in the RLE along with intermittent right buttocks pain. Pain in the right SI area is intermittent.       Date of onset: ongoing for about one year characterized by remissions and exacerbations. Gradual in appearance. Generally worse since onset.   Seemed to exacerbate last week while standing on a ladder decorating.   Falls: 0   Pain:  Current 5/10, worst 5/10, best 0/10   Location: right thigh and anterior leg, right buttocks   Description: Dull and Burning  Aggravating Factors: Walking  Easing Factors: sitting down resting   Sleep: not disturbed.     Current Level of Function:   Personal - not limited   Domestic - not limited   Community - not limited   Occupation - NA   Sports/recreation/fitness - does not participate   Prior Level of Function: more active   Prior Therapy: no   Social History:   lives with their spouse in a single family home. Indoor stairs two flights.   Occupation: not employed.    Patients goals: to learn to manage the problem at home and improve  walking.   Imaging, none:           Medical History:   Past Medical History:   Diagnosis Date    H/O mammogram 11/16/2017    Birads 2/B  @ [facility]     Hearing impaired     History of bone density study 05/09/2018    Spine T-score: 0.9   //   Rt Hip T-score:  0.0   @ [facility]     Hyperlipidemia     Menopause 2009    Occult blood positive stool     Pap smear for cervical cancer screening 08/31/2015    Negative/ Hpv Neg    Relies on partner vasectomy for contraception     Thyroid disease     nodules       Surgical History:   Cara Sharp  has a past surgical history that includes Carpal tunnel release (Left, 1997); Tonsillectomy (1977); Colonoscopy (10/07/2020); Thyroidectomy, partial (2011); Dilation and curettage of uterus (1985); Mastectomy, partial (Left, 12/3/2021); [place] lymph node biopsy (Left, 12/3/2021); and Injection for sentinel node identification (Left, 12/3/2021).    Medications:   Cara has a current medication list which includes the following prescription(s): atorvastatin, atorvastatin, ciclopirox, doxepin, ergocalciferol (vitamin d2), letrozole, letrozole, levothyroxine, paroxetine, and paroxetine, and the following Facility-Administered Medications: sodium chloride 0.9%, celecoxib, fentanyl, lidocaine (pf) 10 mg/ml (1%), and midazolam.    Allergies:   Review of patient's allergies indicates:   Allergen Reactions    Ibuprofen Hives and Shortness Of Breath    Naproxen Hives, Shortness Of Breath and Rash          OBJECTIVE       Posture Alignment: right lateral shift with the left shoulder high. Stands with the Right  knee partially flexed. Pelvic levels equal, PSIS equal.   Sensation: Light Touch: Intact  Palpation: tender / pain right mid gluteal and posterior to the greater trochanter.       Lumbar/Thoracic AROM: Pain/Dysfunction with Movement:   Flexion        120 / 80      = 40 FP   Extension      20 / 10       = 10 DP   Right side bending         20 NP   Left side bending            30 NP   Right  rotation           50% DN   Left rotation              50%  DN       LOWER EXTREMITY STRENGTH:   Left  5/5 Right  5/5           DTR's:   Left Right   Patella Tendon 2+ 2+   Achilles Tendon 2+ 2+           Selective Functional Movement Assessment:  FN: functional, non-painful  FP: functional, painful  DP: dysfunctional, painful  DN: dysfunctional, non-painful      Multi-Segmental Flexion: see above   Multi-Segmental Extension: see above     Multi-Segmental Rotation:   right see above   left see above         FUNCTIONAL MOVEMENT BREAKOUTS:  Long sitting  = FN  SLR   -Active   right FN  left FN    Press up  - DP mid thoracic     Lumbar exten / rot  Active  R- DN  L - DN  Passive  R - DN  L - DN    Lumbar locked rotation   Active  R - DN  L - DN  Passive  R - DN  L - DN    Hip extension   Active  R - FN  L - FN  Passive   R - FN  L - FN     Special Tests  CAN  negative for pain but  dysfunctional / no pain   Stabilized CAN - positive  for core stability and motor control dysfunction  Gaenslens - negative   Compression - negative  Distraction - negative   Thigh thrust  - negative     GAIT: ambulates with no assistive device with independently.     GAIT DEVIATIONS: displays       Limitation/Restriction for FOTO SI / back Survey - not provided     Therapist reviewed FOTO scores for Cara Sharp on 12/5/2023.   FOTO documents entered into [employer] - see Media section.    Limitation Score: -%         TREATMENT     Total Treatment time (time-based codes) separate from Evaluation: - minutes      Cara received the treatments listed below:      therapeutic exercises to develop strength, ROM, and flexibility for - minutes including:  -    manual therapy techniques:  were applied to the: - for - minutes, including:  -    neuromuscular re-education activities to improve: Balance, Proprioception, and stability for - minutes. The following activities were included:  -    therapeutic activities to improve functional performance for  -  minutes, including:  -    gait training to improve functional mobility and safety for -  minutes, including:  -    PATIENT EDUCATION AND HOME EXERCISES     Education provided:   -Findings of evaluation and examination, and affect of these on plan for treatment  -Prognosis and expectations  -Home exercise program and expectations of therapy     Written Home Exercises Provided: no.     ASSESSMENT     Cara is a 68 y.o. female referred to outpatient Physical Therapy with a medical diagnosis of Chronic SI joint pain [M53.3, G89.29] . Patient presents with clinical findings of a core stability and motor control dysfunction, lumbar extension rotation and thoracic extension rotation mobility dysfunction. Clinical test signs are negative for SI dysfunction.   Patient prognosis is Good.   Patient will benefit from skilled outpatient Physical Therapy to address the deficits stated above and in the chart below, provide patient /family education, and to maximize patientt's level of independence.     Plan of care discussed with patient: Yes  Patient's spiritual, cultural and educational needs considered and patient is agreeable to the plan of care and goals as stated below:     Anticipated Barriers for therapy: none    Medical Necessity is demonstrated by the following  History  Co-morbidities and personal factors that may impact the plan of care [x] LOW: no personal factors / co-morbidities  [] MODERATE: 1-2 personal factors / co-morbidities  [] HIGH: 3+ personal factors / co-morbidities    Moderate / High Support Documentation:   Co-morbidities affecting plan of care: none    Personal Factors:   no deficits     Examination  Body Structures and Functions, activity limitations and participation restrictions that may impact the plan of care [x] LOW: addressing 1-2 elements  [] MODERATE: 3+ elements  [] HIGH: 4+ elements (please support below)    Moderate / High Support Documentation: -     Clinical Presentation [x] LOW:  stable  [] MODERATE: Evolving  [] HIGH: Unstable     Decision Making/ Complexity Score: low       Goals:    Goals to be met:    Short Term GOALS: 5 weeks. Pt agrees with goals set.  1. Pts pain intensity decreased 20-40% for improved quality of life and functional mobilty. ONGOING  2. Pt to demonstrate core activation with spinal stability during transitional movements for improved quality of movement. ONGOING  3. Patient demonstrates independence with HEP for self management . ONGOING  4. Patient demonstrates independence with Postural Awareness for control of pain.ONGOING  5. Pt demonstrates passive lumbar extension rotation and thoracic extension rotation to 30 and 50 degrees respectively to improve functional mobility. ONGOING    Long Term GOALS: 10 weeks. Pt agrees with goals set.  1. Patient demonstrates increased active lumbar and thoracic spine extension mobility to 30 and 50 degrees to improve functional mobility and tolerance to functional activities. ONGOING  2. Patient demonstrates increased BLE active SLR mobility to 70 degrees or greater to improve functional mobility and tolerance to functional activities. ONGOING  3. Patient demonstrates improved overall function and return demonstration to functional ADL and recreational lifestyle. ONGOING  4. Pt demonstrates  [place] with body mechanics to control episodes of pain associated with daily ADL and improve functional lifestyle. ONGOING  5. Pts pain level decreased to 75% or greater for with walking for restoring functional mobility and ADL. ONGOING     Plan     Plan of care Certification: 12/5/2023 to 3/5/2024.    Outpatient Physical Therapy 2 times weekly for 10 weeks to include the following interventions: Manual Therapy, Neuromuscular Re-ed, Patient Education, Therapeutic Activities, and Therapeutic Exercise.     Shoaib Bass, PT

## 2023-12-11 ENCOUNTER — CLINICAL SUPPORT (OUTPATIENT)
Dept: REHABILITATION | Facility: HOSPITAL | Age: 68
End: 2023-12-11
Payer: MEDICARE

## 2023-12-11 DIAGNOSIS — R25.9 DYSFUNCTIONAL MOVEMENTS: ICD-10-CM

## 2023-12-11 DIAGNOSIS — M53.80 DECREASED RANGE OF MOTION OF SPINE: Primary | ICD-10-CM

## 2023-12-11 DIAGNOSIS — M62.81 WEAKNESS OF TRUNK MUSCULATURE: ICD-10-CM

## 2023-12-11 PROCEDURE — 97110 THERAPEUTIC EXERCISES: CPT | Mod: PO | Performed by: PHYSICAL THERAPIST

## 2023-12-11 NOTE — PROGRESS NOTES
"              [facility]   Physical Therapy Treatment Note     Name: aCra Sharp  Clinic Number: [id number]    Therapy Diagnosis:   Encounter Diagnoses   Name Primary?    Decreased range of motion of spine Yes    Weakness of trunk musculature     Dysfunctional movements      Physician: Jose Bean MD    Visit Date: 12/11/2023       Physician Orders: PT Eval and Treat low back  Medical Diagnosis from Referral:   Chronic SI joint pain [M53.3, G89.29]   Evaluation Date: 12/5/2023  Authorization Period Expiration: 12/31/2023  Plan of Care Expiration: 3/5/2024  Progress Note Due: 1/5/2024  Visit # / Visits authorized: 1/ 1   FOTO: 0/ 3   PTA Visit #: 0/5     Precautions: Standard    Time In: 1405  Time Out: 1505  Total Billable Time: 60 minutes      SUBJECTIVE     Pt reports: feeling a slight pain on occasion. Presently no pain. The pain may appear when [employer] cleaning up and moving around. .This pain is something new. No pain in the right buttocks and leg.   She was not a home exercise program.  Response to previous treatment: IE performed   Functional change: ongoing    Pain: 0/10  Location: right back        OBJECTIVE           TREATMENT        Cara received the treatments listed below:     .BOLD INDICATES ACTIVITIES PERFORMED / DISCUSSED     Leg press   Recumbent bike  Upright bike   UE ergometer  Treadmill   Elliptical          THERAPEUTIC EXERCISES to develop  strength, ROM, and flexibility for 55 minutes including   SUPINE  -ball push hold 5" x 12   -ball adduc squeeze with glut squeeze  hold 7" x 12   -TA core activation hold   7" x 12   -LTR x15  -bridging on heels x12    SIDELYING  -open book T/L x12   -torso lift (plank pos) x12    PRONE  -sust extension 3'  -press up from forearms x12  -hip extension x12    STANDING.  -back bends x12    SITTING  -      MANUAL THERAPY TECHNIQUES  were applied to - for - minutes.    NEUROMUSCULAR RE-EDUCATION activities to improve: Balance, " Proprioception, motor control and stability  for - minutes. The following activities were included:  -     THERAPEUTIC ACTIVITIES to improve functional performance for -  minutes, including:  -     GAIT TRAINING to improve functional mobility and safety for -  minutes, including:  -    MODALITY      PATIENT EDUCATION AND HOME EXERCISES     Home Exercises Provided and Patient Education Provided     Education provided:   --Findings of evaluation and examination, and affect of these on plan for treatment  -Prognosis and expectations  -Home exercise program and expectations of therapy     Written Home Exercises Provided: no  ASSESSMENT     The patient performed the routine without compromise from pain. She demonstrated satisfactory performance of the exercises. She reported that she did not have pain following the session. Progression with spine mobility activities.     Cara Is progressing towards her goals.   Pt prognosis is Good.     Pt will continue to benefit from skilled outpatient physical therapy to address the deficits listed in the problem list box on initial evaluation, provide pt/family education and to maximize pt's level of independence in the home and community environment.     Pt's spiritual, cultural and educational needs considered and pt agreeable to plan of care and goals.     Anticipated barriers to physical therapy: none  Goals:   Short Term GOALS: 5 weeks. Pt agrees with goals set.  1. Pts pain intensity decreased 20-40% for improved quality of life and functional mobilty. ONGOING  2. Pt to demonstrate core activation with spinal stability during transitional movements for improved quality of movement. ONGOING  3. Patient demonstrates independence with HEP for self management . ONGOING  4. Patient demonstrates independence with Postural Awareness for control of pain.ONGOING  5. Pt demonstrates passive lumbar extension rotation and thoracic extension rotation to 30 and 50 degrees respectively to  improve functional mobility. ONGOING     Long Term GOALS: 10 weeks. Pt agrees with goals set.  1. Patient demonstrates increased active lumbar and thoracic spine extension mobility to 30 and 50 degrees to improve functional mobility and tolerance to functional activities. ONGOING  2. Patient demonstrates increased BLE active SLR mobility to 70 degrees or greater to improve functional mobility and tolerance to functional activities. ONGOING  3. Patient demonstrates improved overall function and return demonstration to functional ADL and recreational lifestyle. ONGOING  4. Pt demonstrates  [place] with body mechanics to control episodes of pain associated with daily ADL and improve functional lifestyle. ONGOING  5. Pts pain level decreased to 75% or greater for with walking for restoring functional mobility and ADL. ONGOING     PLAN     Plan of care Certification: 12/5/2023 to 3/5/2024.     Outpatient Physical Therapy 2 times weekly for 10 weeks to include the following interventions: Manual Therapy, Neuromuscular Re-ed, Patient Education, Therapeutic Activities, and Therapeutic Exercise.     Shoaib Bass, PT

## 2023-12-13 ENCOUNTER — CLINICAL SUPPORT (OUTPATIENT)
Dept: REHABILITATION | Facility: HOSPITAL | Age: 68
End: 2023-12-13
Payer: MEDICARE

## 2023-12-13 DIAGNOSIS — M62.81 WEAKNESS OF TRUNK MUSCULATURE: ICD-10-CM

## 2023-12-13 DIAGNOSIS — M53.80 DECREASED RANGE OF MOTION OF SPINE: Primary | ICD-10-CM

## 2023-12-13 DIAGNOSIS — R25.9 DYSFUNCTIONAL MOVEMENTS: ICD-10-CM

## 2023-12-13 PROCEDURE — 97110 THERAPEUTIC EXERCISES: CPT | Mod: PO | Performed by: PHYSICAL THERAPIST

## 2023-12-13 NOTE — PROGRESS NOTES
"              [facility]   Physical Therapy Treatment Note     Name: Cara Sharp  Clinic Number: [id number]    Therapy Diagnosis:   Encounter Diagnoses   Name Primary?    Decreased range of motion of spine Yes    Weakness of trunk musculature     Dysfunctional movements      Physician: Jose Bean MD    Visit Date: 12/13/2023       Physician Orders: PT Eval and Treat low back  Medical Diagnosis from Referral:   Chronic SI joint pain [M53.3, G89.29]   Evaluation Date: 12/5/2023  Authorization Period Expiration: 12/31/2023  Plan of Care Expiration: 3/5/2024  Progress Note Due: 1/5/2024  Visit # / Visits authorized: 1/ 1   FOTO: 0/ 3   PTA Visit #: 0/5     Precautions: Standard    Time In: 1240  Time Out: 1325  Total Billable Time:  45 minutes      SUBJECTIVE     Pt reports: the back is feeling good. Walking is more of the problem. Right leg does not have the strength like my left leg.         She was not a home exercise program.  Response to previous treatment: IE performed   Functional change: ongoing    Pain: 0/10  Location: right back        OBJECTIVE           TREATMENT        Cara received the treatments listed below:     .BOLD INDICATES ACTIVITIES PERFORMED / DISCUSSED     Leg press   Recumbent bike  Upright bike   UE ergometer  Treadmill   Elliptical          THERAPEUTIC EXERCISES to develop  strength, ROM, and flexibility for 45 minutes including     SUPINE  -ball push hold 5" x 12   -ball adduc squeeze with glut squeeze  hold 7" x 12   -TA core activation hold   7" x 12   -LTR x15  -bridging on heels x12    SIDELYING  -open book T/L x12   -torso lift (plank pos) x12    PRONE  -sust extension 3'  -press up from forearms x12  -hip extension x12    STANDING.  -back bends x12  -windmill / open book x5 ea      SITTING  -      MANUAL THERAPY TECHNIQUES  were applied to - for - minutes.    NEUROMUSCULAR RE-EDUCATION activities to improve: Balance, Proprioception, motor control and " stability  for - minutes. The following activities were included:  -     THERAPEUTIC ACTIVITIES to improve functional performance for -  minutes, including:  -     GAIT TRAINING to improve functional mobility and safety for -  minutes, including:  -    MODALITY      PATIENT EDUCATION AND HOME EXERCISES     Home Exercises Provided and Patient Education Provided     Education provided:   --Findings of evaluation and examination, and affect of these on plan for treatment  -Prognosis and expectations  -Home exercise program and expectations of therapy     Written Home Exercises Provided: no  ASSESSMENT     The patient performed the routine noted without significant increase in pain. She does require significant cueing mainly verbal to initiate the exercise and then perform with the correct technique. She did not report pain at the end of the session.       Cara Is progressing towards her goals.   Pt prognosis is Good.     Pt will continue to benefit from skilled outpatient physical therapy to address the deficits listed in the problem list box on initial evaluation, provide pt/family education and to maximize pt's level of independence in the home and community environment.     Pt's spiritual, cultural and educational needs considered and pt agreeable to plan of care and goals.     Anticipated barriers to physical therapy: none  Goals:   Short Term GOALS: 5 weeks. Pt agrees with goals set.  1. Pts pain intensity decreased 20-40% for improved quality of life and functional mobilty. ONGOING  2. Pt to demonstrate core activation with spinal stability during transitional movements for improved quality of movement. ONGOING  3. Patient demonstrates independence with HEP for self management . ONGOING  4. Patient demonstrates independence with Postural Awareness for control of pain.ONGOING  5. Pt demonstrates passive lumbar extension rotation and thoracic extension rotation to 30 and 50 degrees respectively to improve  functional mobility. ONGOING     Long Term GOALS: 10 weeks. Pt agrees with goals set.  1. Patient demonstrates increased active lumbar and thoracic spine extension mobility to 30 and 50 degrees to improve functional mobility and tolerance to functional activities. ONGOING  2. Patient demonstrates increased BLE active SLR mobility to 70 degrees or greater to improve functional mobility and tolerance to functional activities. ONGOING  3. Patient demonstrates improved overall function and return demonstration to functional ADL and recreational lifestyle. ONGOING  4. Pt demonstrates  [place] with body mechanics to control episodes of pain associated with daily ADL and improve functional lifestyle. ONGOING  5. Pts pain level decreased to 75% or greater for with walking for restoring functional mobility and ADL. ONGOING     PLAN     Plan of care Certification: 12/5/2023 to 3/5/2024.     Outpatient Physical Therapy 2 times weekly for 10 weeks to include the following interventions: Manual Therapy, Neuromuscular Re-ed, Patient Education, Therapeutic Activities, and Therapeutic Exercise.     Shoaib Bass, PT            - - -

## 2023-12-20 ENCOUNTER — CLINICAL SUPPORT (OUTPATIENT)
Dept: REHABILITATION | Facility: HOSPITAL | Age: 68
End: 2023-12-20
Payer: MEDICARE

## 2023-12-20 DIAGNOSIS — M62.81 WEAKNESS OF TRUNK MUSCULATURE: ICD-10-CM

## 2023-12-20 DIAGNOSIS — R25.9 DYSFUNCTIONAL MOVEMENTS: ICD-10-CM

## 2023-12-20 DIAGNOSIS — M53.80 DECREASED RANGE OF MOTION OF SPINE: Primary | ICD-10-CM

## 2023-12-20 PROCEDURE — 97110 THERAPEUTIC EXERCISES: CPT | Mod: PO | Performed by: PHYSICAL THERAPIST

## 2023-12-20 NOTE — PROGRESS NOTES
"              [facility]   Physical Therapy Treatment Note     Name: Cara Sharp  Clinic Number: [id number]    Therapy Diagnosis:   Encounter Diagnoses   Name Primary?    Decreased range of motion of spine Yes    Weakness of trunk musculature     Dysfunctional movements      Physician: Jose Bean MD    Visit Date: 12/20/2023       Physician Orders: PT Eval and Treat low back  Medical Diagnosis from Referral:   Chronic SI joint pain [M53.3, G89.29]   Evaluation Date: 12/5/2023  Authorization Period Expiration: 12/31/2023  Plan of Care Expiration: 3/5/2024  Progress Note Due: 1/5/2024  Visit # / Visits authorized: 1/ 1, 3 /18   FOTO: 0/ 3   PTA Visit #: 0/5     Precautions: Standard    Time In: 1335  Time Out: 1435  Total Billable Time:  60   minutes      SUBJECTIVE     Pt reports: the back is feeling good. Walking is still a problem    She was not issued a home exercise program.  Response to previous treatment: just sore   Functional change: ongoing    Pain: 0/10  Location: right back        OBJECTIVE           TREATMENT        Cara received the treatments listed below:     .BOLD INDICATES ACTIVITIES PERFORMED / DISCUSSED     Leg press   Recumbent bike  Upright bike   UE ergometer  Treadmill   Elliptical          THERAPEUTIC EXERCISES to develop  strength, ROM, and flexibility for 55 minutes including     SUPINE  -ball push hold 5" x 12   -ball adduc squeeze with glut squeeze  hold 7" x 12   -TA core activation hold   7" x 12   -LTR x15  -bridging on heels x12    SIDELYING  -open book T/L   x15  -torso lift (plank pos)  x12    PRONE  -sust extension 3'  -press up from forearms x10  -hip extension x12    STANDING.  -back bends x10  -windmill / open book x5 ea  -Plank at wall opp arm  / leg lifts   -thread the needle     SITTING  -      MANUAL THERAPY TECHNIQUES  were applied to - for - minutes.    NEUROMUSCULAR RE-EDUCATION activities to improve: Balance, Proprioception, motor " control and stability  for - minutes. The following activities were included:  -     THERAPEUTIC ACTIVITIES to improve functional performance for -  minutes, including:  -     GAIT TRAINING to improve functional mobility and safety for -  minutes, including:  -    MODALITY      PATIENT EDUCATION AND HOME EXERCISES     Home Exercises Provided and Patient Education Provided     Education provided:   --Findings of evaluation and examination, and affect of these on plan for treatment  -Prognosis and expectations  -Home exercise program and expectations of therapy     Written Home Exercises Provided: no  ASSESSMENT     The patient performed the routine without limitation. She continues to require cueing with some activities. Demonstrated satisfactory movement patterns. Progress with core strengthening and mobility exercises for the thoracic and lumbar spines.       Cara Is progressing towards her goals.   Pt prognosis is Good.     Pt will continue to benefit from skilled outpatient physical therapy to address the deficits listed in the problem list box on initial evaluation, provide pt/family education and to maximize pt's level of independence in the home and community environment.     Pt's spiritual, cultural and educational needs considered and pt agreeable to plan of care and goals.     Anticipated barriers to physical therapy: none  Goals:   Short Term GOALS: 5 weeks. Pt agrees with goals set.  1. Pts pain intensity decreased 20-40% for improved quality of life and functional mobilty. ONGOING  2. Pt to demonstrate core activation with spinal stability during transitional movements for improved quality of movement. ONGOING  3. Patient demonstrates independence with HEP for self management . ONGOING  4. Patient demonstrates independence with Postural Awareness for control of pain.ONGOING  5. Pt demonstrates passive lumbar extension rotation and thoracic extension rotation to 30 and 50 degrees respectively to improve  functional mobility. ONGOING     Long Term GOALS: 10 weeks. Pt agrees with goals set.  1. Patient demonstrates increased active lumbar and thoracic spine extension mobility to 30 and 50 degrees to improve functional mobility and tolerance to functional activities. ONGOING  2. Patient demonstrates increased BLE active SLR mobility to 70 degrees or greater to improve functional mobility and tolerance to functional activities. ONGOING  3. Patient demonstrates improved overall function and return demonstration to functional ADL and recreational lifestyle. ONGOING  4. Pt demonstrates  [place] with body mechanics to control episodes of pain associated with daily ADL and improve functional lifestyle. ONGOING  5. Pts pain level decreased to 75% or greater for with walking for restoring functional mobility and ADL. ONGOING     PLAN     Plan of care Certification: 12/5/2023 to 3/5/2024.     Outpatient Physical Therapy 2 times weekly for 10 weeks to include the following interventions: Manual Therapy, Neuromuscular Re-ed, Patient Education, Therapeutic Activities, and Therapeutic Exercise.     Shoaib Bass, PT

## 2024-01-04 ENCOUNTER — OFFICE VISIT (OUTPATIENT)
Dept: PODIATRY | Facility: CLINIC | Age: 69
End: 2024-01-04
Payer: MEDICARE

## 2024-01-04 VITALS
WEIGHT: 177.5 LBS | BODY MASS INDEX: 29.57 KG/M2 | DIASTOLIC BLOOD PRESSURE: 96 MMHG | SYSTOLIC BLOOD PRESSURE: 150 MMHG | HEIGHT: 65 IN | HEART RATE: 100 BPM

## 2024-01-04 DIAGNOSIS — B35.1 ONYCHOMYCOSIS DUE TO DERMATOPHYTE: Primary | ICD-10-CM

## 2024-01-04 PROCEDURE — 1160F RVW MEDS BY RX/DR IN RCRD: CPT | Mod: CPTII,S$GLB,, | Performed by: PODIATRIST

## 2024-01-04 PROCEDURE — 3008F BODY MASS INDEX DOCD: CPT | Mod: CPTII,S$GLB,, | Performed by: PODIATRIST

## 2024-01-04 PROCEDURE — 3080F DIAST BP >= 90 MM HG: CPT | Mod: CPTII,S$GLB,, | Performed by: PODIATRIST

## 2024-01-04 PROCEDURE — 99999 PR PBB SHADOW E&M-EST. PATIENT-LVL III: CPT | Mod: PBBFAC,,, | Performed by: PODIATRIST

## 2024-01-04 PROCEDURE — 1126F AMNT PAIN NOTED NONE PRSNT: CPT | Mod: CPTII,S$GLB,, | Performed by: PODIATRIST

## 2024-01-04 PROCEDURE — 99213 OFFICE O/P EST LOW 20 MIN: CPT | Mod: S$GLB,,, | Performed by: PODIATRIST

## 2024-01-04 PROCEDURE — 3077F SYST BP >= 140 MM HG: CPT | Mod: CPTII,S$GLB,, | Performed by: PODIATRIST

## 2024-01-04 PROCEDURE — 1159F MED LIST DOCD IN RCRD: CPT | Mod: CPTII,S$GLB,, | Performed by: PODIATRIST

## 2024-01-04 RX ORDER — ERGOCALCIFEROL 1.25 MG/1
CAPSULE ORAL
COMMUNITY
Start: 2023-02-14

## 2024-01-04 NOTE — PROGRESS NOTES
Subjective:      Patient ID: Cara Sharp is a 68 y.o. female.    Chief Complaint:   Follow-up (Left big toe)    Cara is a 68 y.o. female who returns to the clinic f/u thick nails       Last visit rx penlac and recommend  to eval inserts   Did not have a chance to go to .   Nails are still short and thick... despite some improvement. Relates wish they would grow faster       Past Medical History:   Diagnosis Date    H/O mammogram 11/16/2017    Birads 2/B  @ [facility]     Hearing impaired     History of bone density study 05/09/2018    Spine T-score: 0.9   //   Rt Hip T-score:  0.0   @ [facility]     Hyperlipidemia     Menopause 2009    Occult blood positive stool     Pap smear for cervical cancer screening 08/31/2015    Negative/ Hpv Neg    Relies on partner vasectomy for contraception     Thyroid disease     nodules     Past Surgical History:   Procedure Laterality Date    CARPAL TUNNEL RELEASE Left 1997    COLONOSCOPY  10/07/2020    Diverticulitis ( Rtn 5 yrs) Dr Ashish Leal    DILATION AND CURETTAGE OF UTERUS  1985    LAPAROSCOPY FOR ENDOMETRIOSIS    INJECTION FOR SENTINEL NODE IDENTIFICATION Left 12/3/2021    Procedure: INJECTION, FOR SENTINEL NODE IDENTIFICATION-Left;  Surgeon: ERWIN Chi MD;  Location: [facility] OR;  Service: General;  Laterality: Left;    MASTECTOMY, PARTIAL Left 12/3/2021    Procedure: MASTECTOMY, PARTIAL-Left with radiological marker;  Surgeon: ERWIN Chi MD;  Location: [facility] OR;  Service: General;  Laterality: Left;    SENTINEL LYMPH NODE BIOPSY Left 12/3/2021    Procedure: BIOPSY, LYMPH NODE, SENTINEL-Left;  Surgeon: ERWIN Chi MD;  Location: [facility] OR;  Service: General;  Laterality: Left;    THYROIDECTOMY, PARTIAL  2011    Nodules, Dr Blackwell ([facility])    TONSILLECTOMY  1977     Current Outpatient Medications on File Prior to Visit   Medication Sig Dispense Refill    atorvastatin (LIPITOR) 40 MG tablet Take 40 mg by mouth once daily.      atorvastatin (LIPITOR) 40 MG tablet Take  1 tablet by mouth every evening.      atorvastatin 20 mg/5 mL (4 mg/mL) Susp       ciclopirox (PENLAC) 8 % Soln Apply topically nightly. Remove once a day with nail polish remover 6.6 mL 1    doxepin (SINEQUAN) 10 MG capsule Take 1 capsule (10 mg total) by mouth every evening. 30 capsule 11    ergocalciferol (ERGOCALCIFEROL) 50,000 unit Cap TAKE 1 CAPSULE ONE TIME WEEKLY      ergocalciferol, vitamin D2, (VITAMIN D ORAL) Take by mouth.      letrozole (FEMARA) 2.5 mg Tab TAKE 1 TABLET EVERY DAY 90 tablet 3    LETROZOLE ORAL       levothyroxine (SYNTHROID) 88 MCG tablet       paroxetine (PAXIL) 20 MG tablet       paroxetine (PAXIL) 20 MG tablet Take 1 tablet by mouth every morning.       Current Facility-Administered Medications on File Prior to Visit   Medication Dose Route Frequency Provider Last Rate Last Admin    0.9%  NaCl infusion   Intravenous Continuous Alongi, Shelbie MARINO MD 70 mL/hr at 12/03/21 0630 New Bag at 12/03/21 0630    celecoxib capsule 400 mg  400 mg Oral Once Yonas Werner MD        fentaNYL 50 mcg/mL injection  mcg   mcg Intravenous PRN Yonas Werner MD   100 mcg at 12/03/21 0650    LIDOcaine (PF) 10 mg/ml (1%) injection 10 mg  1 mL Intradermal Once Yonas Werner MD        midazolam (VERSED) 1 mg/mL injection 0.5-4 mg  0.5-4 mg Intravenous PRN Yonas Werner MD   2 mg at 12/03/21 0650     Review of patient's allergies indicates:   Allergen Reactions    Ibuprofen Hives and Shortness Of Breath    Naproxen Hives, Shortness Of Breath and Rash       Review of Systems   Constitutional: Negative for chills, decreased appetite, fever, malaise/fatigue, night sweats, weight gain and weight loss.   HENT:  Positive for hearing loss.    Cardiovascular:  Negative for chest pain, claudication, dyspnea on exertion, leg swelling, palpitations and syncope.   Respiratory:  Negative for cough and shortness of breath.    Endocrine: Negative for cold intolerance and heat  "intolerance.   Hematologic/Lymphatic: Negative for bleeding problem. Does not bruise/bleed easily.   Skin:  Positive for nail changes. Negative for color change, dry skin, flushing, itching, poor wound healing, rash, skin cancer, suspicious lesions and unusual hair distribution.   Musculoskeletal:  Negative for arthritis, back pain, falls, gout, joint pain, joint swelling, muscle cramps, muscle weakness, myalgias, neck pain and stiffness.   Gastrointestinal:  Negative for diarrhea, nausea and vomiting.   Neurological:  Negative for dizziness, focal weakness, light-headedness, numbness, paresthesias, tremors, vertigo and weakness.   Psychiatric/Behavioral:  Negative for altered mental status and depression. The patient does not have insomnia.    Allergic/Immunologic: Negative.            Objective:       Vitals:    01/04/24 1340   BP: (!) 150/96   Pulse: 100   Weight: 80.5 kg (177 lb 7.5 oz)   Height: 5' 5" (1.651 m)   PainSc: 0-No pain   80.5 kg (177 lb 7.5 oz)     Physical Exam  Vitals reviewed.   Constitutional:       General: She is not in acute distress.     Appearance: She is well-developed. She is not ill-appearing, toxic-appearing or diaphoretic.      Comments:    Well worn custom orthotics   Cardiovascular:      Pulses:           Dorsalis pedis pulses are 2+ on the right side and 2+ on the left side.        Posterior tibial pulses are 2+ on the right side and 2+ on the left side.   Musculoskeletal:         General: No tenderness or deformity.      Right lower leg: No edema.      Left lower leg: No edema.      Right ankle: Normal.      Right Achilles Tendon: Normal.      Left ankle: Normal.      Left Achilles Tendon: Normal.      Right foot: Decreased range of motion. No deformity.      Left foot: Decreased range of motion. No deformity.      Comments: No pain on palpation    Feet:      Right foot:      Protective Sensation: 10 sites tested.  10 sites sensed.      Skin integrity: Dry skin present. No ulcer, " blister, skin breakdown, erythema, warmth or callus.      Toenail Condition: Right toenails are normal.      Left foot:      Protective Sensation: 10 sites tested.  10 sites sensed.      Skin integrity: Dry skin present. No ulcer, blister, skin breakdown, erythema, warmth or callus.      Toenail Condition: Left toenails are abnormally thick. Fungal disease present.     Comments: Left hallux nail thickened discolored at the very distal aspect proximal clearing   Improvement noted  Skin:     General: Skin is warm and dry.      Capillary Refill: Capillary refill takes 2 to 3 seconds.      Coloration: Skin is not pale.      Findings: No erythema or rash.   Neurological:      Mental Status: She is alert and oriented to person, place, and time.      Sensory: No sensory deficit.      Gait: Gait is intact.   Psychiatric:         Attention and Perception: Attention normal.         Mood and Affect: Mood normal.         Speech: Speech normal.         Behavior: Behavior normal.         Thought Content: Thought content normal.         Cognition and Memory: Cognition normal.         Judgment: Judgment normal.               Assessment:       Encounter Diagnosis   Name Primary?    Onychomycosis due to dermatophyte Yes           Plan:       Cara was seen today for follow-up.    Diagnoses and all orders for this visit:    Onychomycosis due to dermatophyte        I counseled the patient on her conditions, their implications and medical management.    Pedicure precautions/avoidance    [facility]       - With patient's permission, nails were aggressively reduced and debrided x 1 to their soft tissue attachment mechanically, removing all offending nail and debris. Patient relates relief following the procedure. She will continue to monitor the areas daily, inspect her feet, wear protective shoe gear when ambulatory, moisturizer to maintain skin integrity and follow in this office in approximately 2-3 months,  sooner p.r.n.    Improving.     Finish current bottle of penlac. No refills needed.     Rtc if nail not completely clear in a few months          No follow-ups on file.

## 2024-02-26 ENCOUNTER — HOSPITAL ENCOUNTER (OUTPATIENT)
Dept: RADIOLOGY | Facility: HOSPITAL | Age: 69
Discharge: HOME | End: 2024-02-26
Attending: INTERNAL MEDICINE
Payer: MEDICARE

## 2024-02-26 DIAGNOSIS — R91.1 SOLITARY PULMONARY NODULE: ICD-10-CM

## 2024-02-26 PROCEDURE — 71250 CT THORAX DX C-: CPT | Mod: 26,,, | Performed by: STUDENT IN AN ORGANIZED HEALTH CARE EDUCATION/TRAINING PROGRAM

## 2024-02-26 PROCEDURE — 71250 CT THORAX DX C-: CPT | Mod: TC

## 2024-02-29 ENCOUNTER — OFFICE VISIT (OUTPATIENT)
Dept: HEMATOLOGY/ONCOLOGY | Facility: CLINIC | Age: 69
End: 2024-02-29
Payer: MEDICARE

## 2024-02-29 VITALS — WEIGHT: 176.25 LBS | BODY MASS INDEX: 29.37 KG/M2 | HEIGHT: 65 IN

## 2024-02-29 DIAGNOSIS — C50.412 MALIGNANT NEOPLASM OF UPPER-OUTER QUADRANT OF LEFT BREAST IN FEMALE, ESTROGEN RECEPTOR POSITIVE: Primary | ICD-10-CM

## 2024-02-29 DIAGNOSIS — R91.1 PULMONARY NODULE: ICD-10-CM

## 2024-02-29 DIAGNOSIS — Z17.0 MALIGNANT NEOPLASM OF UPPER-OUTER QUADRANT OF LEFT BREAST IN FEMALE, ESTROGEN RECEPTOR POSITIVE: Primary | ICD-10-CM

## 2024-02-29 PROCEDURE — 1101F PT FALLS ASSESS-DOCD LE1/YR: CPT | Mod: CPTII,S$GLB,, | Performed by: INTERNAL MEDICINE

## 2024-02-29 PROCEDURE — 99213 OFFICE O/P EST LOW 20 MIN: CPT | Mod: S$GLB,,, | Performed by: INTERNAL MEDICINE

## 2024-02-29 PROCEDURE — 1126F AMNT PAIN NOTED NONE PRSNT: CPT | Mod: CPTII,S$GLB,, | Performed by: INTERNAL MEDICINE

## 2024-02-29 PROCEDURE — 3288F FALL RISK ASSESSMENT DOCD: CPT | Mod: CPTII,S$GLB,, | Performed by: INTERNAL MEDICINE

## 2024-02-29 PROCEDURE — 99999 PR PBB SHADOW E&M-EST. PATIENT-LVL III: CPT | Mod: PBBFAC,,, | Performed by: INTERNAL MEDICINE

## 2024-02-29 PROCEDURE — 3008F BODY MASS INDEX DOCD: CPT | Mod: CPTII,S$GLB,, | Performed by: INTERNAL MEDICINE

## 2024-02-29 PROCEDURE — 1159F MED LIST DOCD IN RCRD: CPT | Mod: CPTII,S$GLB,, | Performed by: INTERNAL MEDICINE

## 2024-02-29 NOTE — PROGRESS NOTES
Subjective:       Patient ID: Cara Sharp is a 68 y.o. female.    Chief Complaint: No chief complaint on file.      HPI 68-year-old female seen in Oncology Clinic for follow-up of a diagnosis of left breast cancer.She is on adjuvant letrozole.  She is a patient of .   She is also followed for an incidental finding of a pulmonary nodule.    Today she reports that she has been feeling well.  She has no pain or shortness of breath.        F/U CT 8/15/23 - Left lower lobe solid nodule measures 13 x 12 mm, previously 13 x 9 mm.  Stable pulmonary micro nodules bilaterally.  No new or enlarging nodules.    Upper Abdomen: Stable appearance of subcentimeter hepatic and splenic hypodensities, too small to characterize but favored to represent cysts.     Bones: Similar size and appearance of sclerotic lesion within the sternum, measuring 1.5 cm.  Stable T4 sclerotic focus, likely bone island.      Breast cancer history:  Mammogram on October 12, 2020 demonstrated an irregular mass in the upper outer quadrant left breast measuring 1.4 cm. Ultrasound showed a 1.2 cm hypoechoic mass.    On October 25, 2021 biopsy was performed which showed infiltrating ductal carcinoma with lobular features with some associated intermediate grade DCIS (histologic grade 3, nuclear grade 2, mitotic index 1).  Tumor cells were 96% ER positive, 95% IA positive, HER2 was 1+ and Ki-67 was 9%.  Largest focus was 10 mm.      On 2/7/22 she underwent bilateral nipple sparing mastectomy with autologous reconstruction - KOKO flaps  ([facility] - Dr. Butcher)  The pathology from that surgery showed 2 mm of residual invasive cancer in the left breast with 3 negative sentinel lymph nodes.T1bNo.  The right breast showed no significant abnormality.    Oncotype score was 6.    Letrozole started March 2022.    BMD normal in March 2022.        Pre surgical CT of the abdomen and pelvis on 2/3/22  showed a 9 mm left hepatic density and a 1 cm LLL  pulmonary nodule.    CT 2/10/23 - Lungs/Pleura: Prominent bandlike opacities within the bilateral lower lobes suggestive of scarring or atelectasis.  Along the periphery of the left lower lobe and there is a 1.3 x 0.9 cm nodule.  No other concerning nodules within the lungs.     Hilum/Mediastinum: no hilar or mediastinal lymph node enlargement.   Liver: The liver is normal in size and attenuation.  Subcentimeter hypodensity in the left hepatic lobe likely cyst.  No evidence of concerning hepatic lesions.   Lymph Nodes: Borderline size aortocaval lymph node up to 10 mm in the short axis (3-84)..     Bones: 1.4 cm mixed lucent and sclerotic lesion within the sternum.  Additional punctate focus of sclerosis within the spinous process of T4, likely bone island.  No evidence of other suspicious lesions throughout the bones.      Reviewed CTs - Feb 2022 and Feb 2023.  LLL nodule - unchanged.  The sternal changes were not seen on CT(did not cover that area) but may have been there on MRI from December 2021.    CT chest 2/28/24 -   Lungs/Pleura: Dense bands of atelectasis versus scarring in the bilateral lung bases.  Left lower lobe solid pulmonary nodule measuring 1.4 x 1.7 cm (4-357), previously measuring 1.3 x 1.2 cm.  New nodular opacity along the right lower lobe posterior pleura (4-305), likely related to atelectasis.  No new suspicious pulmonary nodules.  No pleural effusion or pneumothorax.      Review of Systems   Constitutional:  Negative for activity change, appetite change, fever and unexpected weight change.   HENT:  Positive for hearing loss. Negative for mouth sores.    Eyes:  Negative for visual disturbance.   Respiratory:  Negative for cough and shortness of breath.    Cardiovascular:  Negative for chest pain.   Gastrointestinal:  Negative for abdominal pain and diarrhea.   Genitourinary: Negative.  Negative for frequency.   Musculoskeletal:  Negative for back pain.   Integumentary:  Negative for rash.    Neurological:  Negative for headaches.   Hematological:  Negative for adenopathy.   Psychiatric/Behavioral:  The patient is not nervous/anxious.          Objective:      Physical Exam  Vitals reviewed.   Constitutional:       General: She is not in acute distress.  HENT:      Head: Normocephalic.      Mouth/Throat:      Mouth: Mucous membranes are moist.      Pharynx: Oropharynx is clear. No oropharyngeal exudate or posterior oropharyngeal erythema.   Eyes:      General: No scleral icterus.  Cardiovascular:      Rate and Rhythm: Normal rate and regular rhythm.   Pulmonary:      Effort: Pulmonary effort is normal. No respiratory distress.      Breath sounds: Normal breath sounds. No wheezing or rales.   Chest:       Abdominal:      Palpations: Abdomen is soft. There is no mass.      Tenderness: There is no abdominal tenderness.   Lymphadenopathy:      Cervical: No cervical adenopathy.      Upper Body:      Right upper body: No supraclavicular or axillary adenopathy.      Left upper body: No supraclavicular or axillary adenopathy.   Skin:     Findings: No rash.   Neurological:      Mental Status: She is alert and oriented to person, place, and time.   Psychiatric:         Mood and Affect: Mood normal.         Behavior: Behavior normal.         Thought Content: Thought content normal.         Judgment: Judgment normal.       Assessment:       Problem List Items Addressed This Visit       Malignant neoplasm of upper-outer quadrant of left breast in female, estrogen receptor positive - Primary       Plan:     Continue letrozole.  RTC 4 M     Pulmonary nodule -? Slow enlargement  Refer to pulmonary       Route Chart for Scheduling    Med Onc Chart Routing      Follow up with physician 4 months.   Follow up with [facility]    Infusion scheduling note    Injection scheduling note    Labs None   Scheduling:  Preferred lab:  Lab interval:     Imaging None      Pharmacy appointment No pharmacy appointment needed      Other  referrals no referral to Oncology Primary Care needed -  no Massage appointment needed    Additional referrals needed  pulmonary

## 2024-03-04 DIAGNOSIS — E04.2 NONTOXIC MULTINODULAR GOITER: Primary | ICD-10-CM

## 2024-03-07 ENCOUNTER — OFFICE VISIT (OUTPATIENT)
Dept: PULMONOLOGY | Facility: CLINIC | Age: 69
End: 2024-03-07
Payer: MEDICARE

## 2024-03-07 VITALS
HEIGHT: 65 IN | BODY MASS INDEX: 29.38 KG/M2 | DIASTOLIC BLOOD PRESSURE: 83 MMHG | WEIGHT: 176.38 LBS | SYSTOLIC BLOOD PRESSURE: 160 MMHG | OXYGEN SATURATION: 96 % | HEART RATE: 77 BPM

## 2024-03-07 DIAGNOSIS — Z17.0 MALIGNANT NEOPLASM OF UPPER-OUTER QUADRANT OF LEFT BREAST IN FEMALE, ESTROGEN RECEPTOR POSITIVE: Primary | ICD-10-CM

## 2024-03-07 DIAGNOSIS — C50.412 MALIGNANT NEOPLASM OF UPPER-OUTER QUADRANT OF LEFT BREAST IN FEMALE, ESTROGEN RECEPTOR POSITIVE: Primary | ICD-10-CM

## 2024-03-07 DIAGNOSIS — R91.1 PULMONARY NODULE: ICD-10-CM

## 2024-03-07 PROCEDURE — 1126F AMNT PAIN NOTED NONE PRSNT: CPT | Mod: CPTII,S$GLB,, | Performed by: INTERNAL MEDICINE

## 2024-03-07 PROCEDURE — 3077F SYST BP >= 140 MM HG: CPT | Mod: CPTII,S$GLB,, | Performed by: INTERNAL MEDICINE

## 2024-03-07 PROCEDURE — 99999 PR PBB SHADOW E&M-EST. PATIENT-LVL III: CPT | Mod: PBBFAC,,, | Performed by: INTERNAL MEDICINE

## 2024-03-07 PROCEDURE — 99204 OFFICE O/P NEW MOD 45 MIN: CPT | Mod: S$GLB,,, | Performed by: INTERNAL MEDICINE

## 2024-03-07 PROCEDURE — 3079F DIAST BP 80-89 MM HG: CPT | Mod: CPTII,S$GLB,, | Performed by: INTERNAL MEDICINE

## 2024-03-07 PROCEDURE — 3008F BODY MASS INDEX DOCD: CPT | Mod: CPTII,S$GLB,, | Performed by: INTERNAL MEDICINE

## 2024-03-07 PROCEDURE — 1159F MED LIST DOCD IN RCRD: CPT | Mod: CPTII,S$GLB,, | Performed by: INTERNAL MEDICINE

## 2024-03-07 RX ORDER — PAROXETINE 30 MG/1
30 TABLET, FILM COATED ORAL EVERY MORNING
COMMUNITY

## 2024-03-07 NOTE — ASSESSMENT & PLAN NOTE
Increased in size over 2 years. Adjacent to 2 areas of platelike atelectasis. Never smoker and no asbestos exposure.  Overall I favor this is rounded atelectasis but given within 5 years of breast cancer with LN involvement, I recommend IR guided biopsy.  Patient agreeable.   If biopsy benign, would continue to follow with yearly CT  Lam calculator risk 33% but surrounding atelectasis reduces that risk

## 2024-03-07 NOTE — PROGRESS NOTES
Subjective:       Patient ID: Cara Sharp is a 68 y.o. female.    Chief Complaint: No chief complaint on file.    69 yo female with h/o breast cancer with spread to local LN treated with radical bilateral mastectomy and hormonal therapy presents for evalaution of increasing in size LLL pulmonary nodule. No other signs or concerns of persistent breast cancer. Nodule was seen on her first scan for the cancer in Feb 2022. Has increased in size on last 2 scans and now up to [id number]. She is a never smoker. No asbestos exposure. FH of lung cancer in father who was a heavy smoker.     No resp symptoms.  Review of Systems   All other systems reviewed and are negative.      Past Medical History:   Diagnosis Date    H/O mammogram 11/16/2017    Birads 2/B  @ [facility]     Hearing impaired     History of bone density study 05/09/2018    Spine T-score: 0.9   //   Rt Hip T-score:  0.0   @ [facility]     Hyperlipidemia     Menopause 2009    Occult blood positive stool     Pap smear for cervical cancer screening 08/31/2015    Negative/ Hpv Neg    Relies on partner vasectomy for contraception     Thyroid disease     nodules        Family History   Problem Relation Age of Onset    Lung cancer Father 80    Hypertension Father     Hyperlipidemia Father     Cancer Father     Thyroid disease Mother     Hypertension Mother     Hyperlipidemia Mother     Ovarian cancer Maternal Aunt 52    Cancer Maternal Aunt     Cervical cancer Sister         Pre Cancerous - Total Hyst done     Breast cancer Neg Hx     Colon cancer Neg Hx       If not mentioned in HPI, Family history is reviewed and not contributory    Social History     Tobacco Use    Smoking status: Never    Smokeless tobacco: Never   Substance Use Topics    Alcohol use: Yes     Comment: Social    Drug use: No        Objective:        Vitals:    03/07/24 1353   BP: (!) 160/83   Pulse: 77     Wt Readings from Last 3 Encounters:   03/07/24 80 kg (176 lb 5.9 oz)   02/29/24  79.9 kg (176 lb 4.1 oz)   01/04/24 80.5 kg (177 lb 7.5 oz)     Temp Readings from Last 3 Encounters:   11/28/23 98 °F (36.7 °C) (Oral)   08/16/23 98.1 °F (36.7 °C) (Oral)   05/16/23 98.6 °F (37 °C) (Oral)     BP Readings from Last 3 Encounters:   03/07/24 (!) 160/83   01/04/24 (!) 150/96   11/28/23 (!) 165/75     Pulse Readings from Last 3 Encounters:   03/07/24 77   01/04/24 100   11/28/23 77       Physical Exam   Constitutional: She is oriented to person, place, and time. She appears well-developed and well-nourished.   HENT:   Head: Normocephalic.   Mouth/Throat: Oropharynx is clear and moist.   Cardiovascular: Normal rate, regular rhythm and normal heart sounds.   Pulmonary/Chest: Normal expansion, symmetric chest wall expansion, effort normal and breath sounds normal.   Abdominal: Soft. She exhibits no distension.   Musculoskeletal:         General: No edema. Normal range of motion.      Cervical back: Neck supple.   Lymphadenopathy:     She has no cervical adenopathy.   Neurological: She is alert and oriented to person, place, and time. Gait normal.   Skin: Skin is warm and dry. No rash noted.   Psychiatric: She has a normal mood and affect. Her behavior is normal. Judgment and thought content normal.   Vitals reviewed.    CBC  Lab Results   Component Value Date    WBC 7.58 11/12/2021    HGB 15.1 11/12/2021    HCT 44.1 11/12/2021    MCV 94 11/12/2021     11/12/2021         CMP  Sodium   Date Value Ref Range Status   11/12/2021 142 136 - 145 mmol/L Final     Potassium   Date Value Ref Range Status   11/12/2021 3.8 3.5 - 5.1 mmol/L Final     Chloride   Date Value Ref Range Status   11/12/2021 105 95 - 110 mmol/L Final     CO2   Date Value Ref Range Status   11/12/2021 27 23 - 29 mmol/L Final     Glucose   Date Value Ref Range Status   11/12/2021 88 70 - 110 mg/dL Final     BUN   Date Value Ref Range Status   11/12/2021 11 8 - 23 mg/dL Final     Creatinine   Date Value Ref Range Status   11/12/2021 0.7 0.5  "- 1.4 mg/dL Final     Calcium   Date Value Ref Range Status   11/12/2021 9.4 8.7 - 10.5 mg/dL Final     Total Protein   Date Value Ref Range Status   11/12/2021 7.5 6.0 - 8.4 g/dL Final     Albumin   Date Value Ref Range Status   11/12/2021 4.0 3.5 - 5.2 g/dL Final     Total Bilirubin   Date Value Ref Range Status   11/12/2021 0.5 0.1 - 1.0 mg/dL Final     Comment:     For infants and newborns, interpretation of results should be based  on gestational age, weight and in agreement with clinical  observations.    Premature Infant recommended reference ranges:  Up to 24 hours.............<8.0 mg/dL  Up to 48 hours............<12.0 mg/dL  3-5 days..................<15.0 mg/dL  6-29 days.................<15.0 mg/dL       Alkaline Phosphatase   Date Value Ref Range Status   11/12/2021 65 55 - 135 U/L Final     AST   Date Value Ref Range Status   11/12/2021 18 10 - 40 U/L Final     ALT   Date Value Ref Range Status   11/12/2021 23 10 - 44 U/L Final     Anion Gap   Date Value Ref Range Status   11/12/2021 10 8 - 16 mmol/L Final       ABG  No results found for: "PH", "PO2", "PCO2"        Personal Diagnostic Review  I have personally reviewed the following data and added my own interpretation as below:  CT of chest performed on Feb 2024 without contrast revealed slight interval increase in sive of LLL nodule now 17mm in largest diamter. Platelike atelectasis surrounding. Overall favor atelectasis but with growth over last 2 scans and breast cancer, needs biopsy. With location and surrounding atelectasis, CT guided biopsy preferred.      3/7/2024     1:53 PM 2/29/2024     9:19 AM 1/4/2024     1:40 PM 11/28/2023     8:49 AM 9/27/2023     1:11 PM 8/16/2023     8:38 AM 7/18/2023     2:19 PM   Pulmonary Function Tests   SpO2 96 %   96 %  97 %    Height 5' 5" (1.651 m) 5' 5" (1.651 m) 5' 5" (1.651 m) 5' 5" (1.651 m) 5' 6" (1.676 m) 5' 6" (1.676 m)    Weight 80 kg (176 lb 5.9 oz) 79.9 kg (176 lb 4.1 oz) 80.5 kg (177 lb 7.5 oz) 78.9 " kg (173 lb 15.1 oz) 79.4 kg (175 lb) 79.6 kg (175 lb 9.5 oz) 79.4 kg (175 lb 0.7 oz)   BMI (Calculated) 29.3 29.3 29.5 28.9 28.3 28.4          Assessment:       1. Malignant neoplasm of upper-outer quadrant of left breast in female, estrogen receptor positive    2. Pulmonary nodule        Outpatient Encounter Medications as of 3/7/2024   Medication Sig Dispense Refill    atorvastatin (LIPITOR) 40 MG tablet Take 1 tablet by mouth every evening.      ciclopirox (PENLAC) 8 % Soln Apply topically nightly. Remove once a day with nail polish remover 6.6 mL 1    ergocalciferol (ERGOCALCIFEROL) 50,000 unit Cap TAKE 1 CAPSULE ONE TIME WEEKLY      letrozole (FEMARA) 2.5 mg Tab TAKE 1 TABLET EVERY DAY 90 tablet 3    levothyroxine (SYNTHROID) 88 MCG tablet       paroxetine (PAXIL) 30 MG tablet Take 30 mg by mouth.      atorvastatin 20 mg/5 mL (4 mg/mL) Susp       LETROZOLE ORAL       paroxetine (PAXIL) 20 MG tablet Take 1 tablet by mouth every morning.       Facility-Administered Encounter Medications as of 3/7/2024   Medication Dose Route Frequency Provider Last Rate Last Admin    0.9%  NaCl infusion   Intravenous Continuous Alongi, Shelbie MARINO MD 70 mL/hr at 12/03/21 0630 New Bag at 12/03/21 0630    celecoxib capsule 400 mg  400 mg Oral Once Yonas Werner MD        fentaNYL 50 mcg/mL injection  mcg   mcg Intravenous PRN Yonas Werner MD   100 mcg at 12/03/21 0650    LIDOcaine (PF) 10 mg/ml (1%) injection 10 mg  1 mL Intradermal Once Yonas Werner MD        midazolam (VERSED) 1 mg/mL injection 0.5-4 mg  0.5-4 mg Intravenous PRN Yonas Werner MD   2 mg at 12/03/21 0650     1. Pulmonary nodule  - Ambulatory referral/consult to Pulmonology  - IR Biopsy Lung w/ guidance; Future    2. Malignant neoplasm of upper-outer quadrant of left breast in female, estrogen receptor positive    Plan:     Problem List Items Addressed This Visit          Pulmonary    Pulmonary nodule     Current Assessment & Plan     Increased in size over 2 years. Adjacent to 2 areas of platelike atelectasis. Never smoker and no asbestos exposure.  Overall I favor this is rounded atelectasis but given within 5 years of breast cancer with LN involvement, I recommend IR guided biopsy.  Patient agreeable.   If biopsy benign, would continue to follow with yearly CT  Lam calculator risk 33% but surrounding atelectasis reduces that risk         Relevant Orders    IR Biopsy Lung w/ guidance       Oncology    Malignant neoplasm of upper-outer quadrant of left breast in female, estrogen receptor positive - Primary    Current Assessment & Plan     Noted. Increases need for biopsy. She is on maintenance hormonal treatments              No follow-ups on file.    Future Appointments   Date Time Provider Department Center   5/14/2024 10:15 AM [facility] OIC-US1 MASTER [facility]TR [facility]   6/27/2024  9:40 AM Jose Bean MD [facility] Sae Baca MD

## 2024-03-08 ENCOUNTER — PATIENT MESSAGE (OUTPATIENT)
Dept: PULMONOLOGY | Facility: CLINIC | Age: 69
End: 2024-03-08
Payer: MEDICARE

## 2024-03-14 ENCOUNTER — CLINICAL SUPPORT (OUTPATIENT)
Dept: INTERVENTIONAL RADIOLOGY/VASCULAR | Facility: CLINIC | Age: 69
End: 2024-03-14
Payer: MEDICARE

## 2024-03-14 DIAGNOSIS — D49.9 NEOPLASM: ICD-10-CM

## 2024-03-14 DIAGNOSIS — R91.1 PULMONARY NODULE: Primary | ICD-10-CM

## 2024-03-14 PROCEDURE — 99204 OFFICE O/P NEW MOD 45 MIN: CPT | Mod: 95,,, | Performed by: RADIOLOGY

## 2024-03-14 NOTE — PROGRESS NOTES
"Subjective     Patient ID: Cara Sharp is a 68 y.o. female.    Chief Complaint: Lesion    Virtual visit with patient referred to Interventional Radiology by Jonathan Baca MD for evaluation of a left lower lung nodule. She has a history breast cancer with spread to local LN treated with radical bilateral mastectomy and hormonal therapy. Recent CT scan obtained on 2/26/2024 noted "Left lower lobe solid pulmonary nodule measuring 1.4 x 1.7 cm (4-357), previously measuring 1.3 x 1.2 cm." Patient reports feeling well. She denies any chronic cough, hemoptysis, or dyspnea. Her sister is present for our visit.       Review of Systems   Constitutional:  Negative for activity change, appetite change, chills, fatigue and fever.   Respiratory:  Negative for cough, shortness of breath, wheezing and stridor.    Cardiovascular:  Negative for chest pain, palpitations and leg swelling.   Gastrointestinal:  Negative for abdominal distention, abdominal pain, constipation, diarrhea, nausea and vomiting.          Objective     Physical Exam  Constitutional:       General: She is not in acute distress.     Appearance: She is well-developed. She is not diaphoretic.   HENT:      Head: Normocephalic and atraumatic.   Pulmonary:      Effort: Pulmonary effort is normal. No respiratory distress.   Neurological:      Mental Status: She is alert and oriented to person, place, and time.   Psychiatric:         Behavior: Behavior normal.         Thought Content: Thought content normal.         Judgment: Judgment normal.       Reviewed pulmonology progress note    CT 2/26/2024       Assessment and Plan     1. Pulmonary nodule  -     IR Biopsy Lung w/ guidance; Future; Expected date: 03/14/2024  -     CBC Auto Differential; Future; Expected date: 03/14/2024  -     Protime-INR; Future; Expected date: 03/14/2024    2. Neoplasm  -     CBC Auto Differential; Future; Expected date: 03/14/2024  -     Protime-INR; Future; Expected date: " 03/14/2024        The patient location is: [place]  The chief complaint leading to consultation is: lung nodule    Visit type: audiovisual    Face to Face time with patient: 20 minutes  25 minutes of total time spent on the encounter, which includes face to face time and non-face to face time preparing to see the patient (eg, review of tests), Obtaining and/or reviewing separately obtained history, Documenting clinical information in the electronic or other health record, Independently interpreting results (not separately reported) and communicating results to the patient/family/caregiver, or Care coordination (not separately reported).         Each patient to whom he or she provides medical services by telemedicine is:  (1) informed of the relationship between the physician and patient and the respective role of any other health care provider with respect to management of the patient; and (2) notified that he or she may decline to receive medical services by telemedicine and may withdraw from such care at any time.    Notes:   Discussed case with Dr. Cruz. Explained to patient can offer biopsy of left lower lobe lung nodule. Discussed how the procedure will be performed, risks (including, but not limited to, pain, bleeding, infection, damage to nearby structures, potential to develop pneumothorax and subsequent need for chest tube placement, and the need for additional procedures), benefits, possible complications, pre-post procedure expectations, and alternatives. The patient voices understanding and all questions have been answered.  The patient agrees to proceed as planned. Offered patient date of 3/27/2024. Patient declined and requested a date between 4/9/2024 - 4/12/2024. Patient scheduled for 4/9/2024. Clinic phone number provided.

## 2024-03-14 NOTE — Clinical Note
"Thank you for referring Ms. Sharp to Interventional Radiology at the [facility]. Please don't hesitate to contact us if there are any questions regarding this evaluation at [phone]. If you have any other patients for whom you would like a consultation, please place an order for "[id number]", and we will be happy to review their case.  Sincerely, ELENA Dixon, FNP Interventional Radiology   "

## 2024-04-08 ENCOUNTER — TELEPHONE (OUTPATIENT)
Dept: INTERVENTIONAL RADIOLOGY/VASCULAR | Facility: HOSPITAL | Age: 69
End: 2024-04-08
Payer: MEDICARE

## 2024-04-08 NOTE — NURSING
Pre-procedure call complete.  Pt instructed not to eat or drink anything after midnight the night before procedure.  Pt aware will need someone to provide transport home and monitor pt 8 hours post procedure.  No driving for at least 24 hours after procedure.    Patient reports she does not take blood pressure, heart medications, seizure and anti-rejection medications.  Do not take sleep medication (including OTC) the night before procedure.  Arrival time and location given.  Expected length of stay reviewed.  Covid screening completed.  Pt verbalized understanding of all pre-procedure instructions.  Written instructions and directions sent to patient in [employer]/portal.

## 2024-04-09 ENCOUNTER — HOSPITAL ENCOUNTER (OUTPATIENT)
Dept: RADIOLOGY | Facility: HOSPITAL | Age: 69
Discharge: HOME | End: 2024-04-09
Attending: INTERNAL MEDICINE
Payer: MEDICARE

## 2024-04-09 ENCOUNTER — HOSPITAL ENCOUNTER (OUTPATIENT)
Dept: INTERVENTIONAL RADIOLOGY/VASCULAR | Facility: HOSPITAL | Age: 69
Discharge: HOME | End: 2024-04-09
Attending: FAMILY MEDICINE
Payer: MEDICARE

## 2024-04-09 DIAGNOSIS — R91.1 PULMONARY NODULE: ICD-10-CM

## 2024-04-09 PROCEDURE — 99152 MOD SED SAME PHYS/QHP 5/>YRS: CPT | Performed by: STUDENT IN AN ORGANIZED HEALTH CARE EDUCATION/TRAINING PROGRAM

## 2024-04-09 PROCEDURE — 71045 X-RAY EXAM CHEST 1 VIEW: CPT | Mod: 26,,, | Performed by: RADIOLOGY

## 2024-04-09 PROCEDURE — 71045 X-RAY EXAM CHEST 1 VIEW: CPT | Mod: TC

## 2024-04-09 PROCEDURE — 99152 MOD SED SAME PHYS/QHP 5/>YRS: CPT | Mod: ,,, | Performed by: STUDENT IN AN ORGANIZED HEALTH CARE EDUCATION/TRAINING PROGRAM

## 2024-04-09 PROCEDURE — 71045 X-RAY EXAM CHEST 1 VIEW: CPT | Mod: 26,76,, | Performed by: STUDENT IN AN ORGANIZED HEALTH CARE EDUCATION/TRAINING PROGRAM

## 2024-04-09 PROCEDURE — 63600175 PHARM REV CODE 636 W HCPCS: Performed by: STUDENT IN AN ORGANIZED HEALTH CARE EDUCATION/TRAINING PROGRAM

## 2024-04-09 PROCEDURE — 32408 CORE NDL BX LNG/MED PERQ: CPT | Performed by: STUDENT IN AN ORGANIZED HEALTH CARE EDUCATION/TRAINING PROGRAM

## 2024-04-09 PROCEDURE — 99153 MOD SED SAME PHYS/QHP EA: CPT | Performed by: STUDENT IN AN ORGANIZED HEALTH CARE EDUCATION/TRAINING PROGRAM

## 2024-04-09 PROCEDURE — 27201068 IR BIOPSY LUNG W/ GUIDANCE

## 2024-04-09 RX ORDER — FENTANYL CITRATE 50 UG/ML
INJECTION, SOLUTION INTRAMUSCULAR; INTRAVENOUS
Status: COMPLETED | OUTPATIENT
Start: 2024-04-09 | End: 2024-04-09

## 2024-04-09 RX ORDER — LIDOCAINE HYDROCHLORIDE 10 MG/ML
1 INJECTION, SOLUTION EPIDURAL; INFILTRATION; INTRACAUDAL; PERINEURAL ONCE
Status: DISCONTINUED | OUTPATIENT
Start: 2024-04-09 | End: 2024-04-10 | Stop reason: HOSPADM

## 2024-04-09 RX ORDER — MIDAZOLAM HYDROCHLORIDE 1 MG/ML
INJECTION, SOLUTION INTRAMUSCULAR; INTRAVENOUS
Status: COMPLETED | OUTPATIENT
Start: 2024-04-09 | End: 2024-04-09

## 2024-04-09 RX ORDER — SODIUM CHLORIDE 9 MG/ML
INJECTION, SOLUTION INTRAVENOUS CONTINUOUS
Status: DISCONTINUED | OUTPATIENT
Start: 2024-04-09 | End: 2024-04-10 | Stop reason: HOSPADM

## 2024-04-09 RX ADMIN — MIDAZOLAM HYDROCHLORIDE 1.5 MG: 2 INJECTION, SOLUTION INTRAMUSCULAR; INTRAVENOUS at 10:04

## 2024-04-09 RX ADMIN — FENTANYL CITRATE 50 MCG: 50 INJECTION, SOLUTION INTRAMUSCULAR; INTRAVENOUS at 10:04

## 2024-04-09 RX ADMIN — MIDAZOLAM HYDROCHLORIDE 0.5 MG: 2 INJECTION, SOLUTION INTRAMUSCULAR; INTRAVENOUS at 10:04

## 2024-04-09 RX ADMIN — FENTANYL CITRATE 25 MCG: 50 INJECTION, SOLUTION INTRAMUSCULAR; INTRAVENOUS at 10:04

## 2024-04-09 NOTE — PLAN OF CARE
Pt arrived to IR  for L lung bx. Pt oriented to unit and staff, Pt safely transferred from stretcher to procedural table. Fall risk reviewed and comfort measures utilized with interventions. Safety strap applied, position pillows to minimize pressure points. Blankets applied. Pt prepped and draped utilizing standard sterile technique. Patient placed on continuous monitoring, as required by sedation policy. Timeouts implemented utilizing standard universal time-out per department and facility policy. RN to remain at bedside with continuous monitoring. Pt resting comfortably. Denies pain/discomfort. Will continue to monitor. See flow sheets for monitoring, medication administration, and updates. patient verbalizes understanding.

## 2024-04-09 NOTE — DISCHARGE INSTRUCTIONS
Please call with any questions or concerns.      Monday thru Friday 8:00 am - 4:30 pm    Interventional Radiology   [phone]    After Hours    Ask for the Radiology Resident on call  [phone]

## 2024-04-09 NOTE — PLAN OF CARE
Procedure complete. Pt tolerated well. Recovery for 3 hr, pending CXR at 1 and 3 hr post-op. Site CDI. Pt transferred to MPU 6 and report to be given bedside.

## 2024-04-09 NOTE — H&P
Radiology History & Physical      SUBJECTIVE:     Chief Complaint: FRANCISCO lung nodule    History of Present Illness:  Cara Sharp is a 68 y.o. female who presents for FRANCISCO nodule bx. Pt with hx of breast cancer.  Past Medical History:   Diagnosis Date    H/O mammogram 11/16/2017    Birads 2/B  @ DIS     Hearing impaired     History of bone density study 05/09/2018    Spine T-score: 0.9   //   Rt Hip T-score:  0.0   @ DIS     Hyperlipidemia     Menopause 2009    Occult blood positive stool     Pap smear for cervical cancer screening 08/31/2015    Negative/ Hpv Neg    Relies on partner vasectomy for contraception     Thyroid disease     nodules     Past Surgical History:   Procedure Laterality Date    CARPAL TUNNEL RELEASE Left 1997    COLONOSCOPY  10/07/2020    Diverticulitis ( Rtn 5 yrs) Dr Ashish Leal    DILATION AND CURETTAGE OF UTERUS  1985    LAPAROSCOPY FOR ENDOMETRIOSIS    INJECTION FOR SENTINEL NODE IDENTIFICATION Left 12/03/2021    Procedure: INJECTION, FOR SENTINEL NODE IDENTIFICATION-Left;  Surgeon: ERWIN Chi MD;  Location: [facility] OR;  Service: General;  Laterality: Left;    MASTECTOMY, PARTIAL Left 12/03/2021    bilateral mastectomy - Procedure: MASTECTOMY, PARTIAL-Left with radiological marker;  Surgeon: ERWIN Chi MD;  Location: [facility] OR;  Service: General;  Laterality: Left;    SENTINEL LYMPH NODE BIOPSY Left 12/03/2021    Procedure: BIOPSY, LYMPH NODE, SENTINEL-Left;  Surgeon: ERWIN Chi MD;  Location: [facility] OR;  Service: General;  Laterality: Left;    THYROIDECTOMY, PARTIAL  2011    Nodules, Dr Blackwell ([facility])    TONSILLECTOMY  1977       Home Meds:   Prior to Admission medications    Medication Sig Start Date End Date Taking? Authorizing Provider   atorvastatin (LIPITOR) 40 MG tablet Take 1 tablet by mouth every evening. 8/22/23  Yes Provider, Historical   letrozole (FEMARA) 2.5 mg Tab TAKE 1 TABLET EVERY DAY 2/14/23  Yes Jose Bean MD   levothyroxine (SYNTHROID) 88 MCG tablet   "8/29/16  Yes Provider, Historical   paroxetine (PAXIL) 30 MG tablet Take 30 mg by mouth.   Yes Provider, Historical   ciclopirox (PENLAC) 8 % Soln Apply topically nightly. Remove once a day with nail polish remover 9/27/23   Lizbeth Mera DPM   ergocalciferol (ERGOCALCIFEROL) 50,000 unit Cap TAKE 1 CAPSULE ONE TIME WEEKLY 2/14/23   Provider, Historical     Anticoagulants/Antiplatelets: no anticoagulation    Allergies:   Review of patient's allergies indicates:   Allergen Reactions    Ibuprofen Hives and Shortness Of Breath    Naproxen Hives, Shortness Of Breath and Rash     Sedation History:  no adverse reactions    Review of Systems:   Hematological: no known coagulopathies  Respiratory: no shortness of breath  Cardiovascular: no chest pain  Gastrointestinal: no abdominal pain  Genito-Urinary: no dysuria  Musculoskeletal: negative  Neurological: wearing hearing aid. no TIA or stroke symptoms         OBJECTIVE:     Vital Signs (Most Recent)  Temp: 97.9 °F (36.6 °C) (04/09/24 0740)  Pulse: 73 (04/09/24 0740)  Resp: 20 (04/09/24 0740)  BP: (!) 174/74 (04/09/24 0823)  SpO2: 98 % (04/09/24 0740)    Physical Exam:  ASA: 3  Mallampati: 3    General: no acute distress  Mental Status: alert and oriented to person, place and time  HEENT: normocephalic, atraumatic  Chest: unlabored breathing  Heart: regular heart rate  Abdomen: nondistended  Extremity: moves all extremities. L UE swelling.    Laboratory  No results found for: "INR", "PT", "PTT"    Lab Results   Component Value Date    WBC 7.58 11/12/2021    HGB 15.1 11/12/2021    HCT 44.1 11/12/2021    MCV 94 11/12/2021     11/12/2021      Lab Results   Component Value Date    GLU 88 11/12/2021     11/12/2021    K 3.8 11/12/2021     11/12/2021    CO2 27 11/12/2021    BUN 11 11/12/2021    CREATININE 0.7 11/12/2021    CALCIUM 9.4 11/12/2021    ALT 23 11/12/2021    AST 18 11/12/2021    ALBUMIN 4.0 11/12/2021    BILITOT 0.5 11/12/2021       ASSESSMENT/PLAN: "     Sedation Plan: up to moderate  Patient will undergo FRANCISCO nodule biopsy.    Julia Cox MD MSCR  PGY-5 Radiology Resident

## 2024-04-09 NOTE — NURSING
Pt provided discharge instructions and all questions addressed.  Pt also provided phone numbers to clinic and resident on call for any other questions or concerns.

## 2024-04-09 NOTE — CARE UPDATE
CXR one hour post read by Dr. Sexton. Dr. Sexton states CXR looks good. Will continue to monitor for s/s of distress.

## 2024-04-09 NOTE — PROCEDURES
Radiology Post-Procedure Note    Pre Op Diagnosis:  Left lung nodule    Post Op Diagnosis:  Same    Procedure:  CT guided lung biopsy    Procedure performed by:  Kimani Sexton MD /  STEPHANI Stock MD    Written Informed Consent Obtained:  Yes    Specimen Removed:  Yes;     Estimated Blood Loss: minimal      Findings:     CT guided biopsy of LLL lung mass using 20g biopsy needle / 19g coaxial sheath needle.     Specimen sent to pathology.      Patient tolerated procedure well.      Kimani Sexton MD  VIR Fellow

## 2024-04-10 VITALS
DIASTOLIC BLOOD PRESSURE: 70 MMHG | HEART RATE: 74 BPM | TEMPERATURE: 98 F | SYSTOLIC BLOOD PRESSURE: 145 MMHG | BODY MASS INDEX: 27.32 KG/M2 | HEIGHT: 66 IN | OXYGEN SATURATION: 99 % | WEIGHT: 170 LBS | RESPIRATION RATE: 16 BRPM

## 2024-04-15 ENCOUNTER — OFFICE VISIT (OUTPATIENT)
Dept: PULMONOLOGY | Facility: CLINIC | Age: 69
End: 2024-04-15
Payer: MEDICARE

## 2024-04-15 VITALS
WEIGHT: 173.31 LBS | DIASTOLIC BLOOD PRESSURE: 81 MMHG | HEART RATE: 76 BPM | BODY MASS INDEX: 27.85 KG/M2 | HEIGHT: 66 IN | SYSTOLIC BLOOD PRESSURE: 148 MMHG | OXYGEN SATURATION: 96 %

## 2024-04-15 DIAGNOSIS — Z17.0 MALIGNANT NEOPLASM OF UPPER-OUTER QUADRANT OF LEFT BREAST IN FEMALE, ESTROGEN RECEPTOR POSITIVE: ICD-10-CM

## 2024-04-15 DIAGNOSIS — C50.412 MALIGNANT NEOPLASM OF UPPER-OUTER QUADRANT OF LEFT BREAST IN FEMALE, ESTROGEN RECEPTOR POSITIVE: ICD-10-CM

## 2024-04-15 DIAGNOSIS — C34.92 NSCLC OF LEFT LUNG: Primary | ICD-10-CM

## 2024-04-15 DIAGNOSIS — C34.32 ADENOCARCINOMA OF LOWER LOBE OF LEFT LUNG: Primary | ICD-10-CM

## 2024-04-15 LAB
FINAL PATHOLOGIC DIAGNOSIS: NORMAL
GROSS: NORMAL
Lab: NORMAL
SUPPLEMENTAL DIAGNOSIS: NORMAL

## 2024-04-15 PROCEDURE — 3079F DIAST BP 80-89 MM HG: CPT | Mod: CPTII,S$GLB,, | Performed by: INTERNAL MEDICINE

## 2024-04-15 PROCEDURE — 1159F MED LIST DOCD IN RCRD: CPT | Mod: CPTII,S$GLB,, | Performed by: INTERNAL MEDICINE

## 2024-04-15 PROCEDURE — 1126F AMNT PAIN NOTED NONE PRSNT: CPT | Mod: CPTII,S$GLB,, | Performed by: INTERNAL MEDICINE

## 2024-04-15 PROCEDURE — 99999 PR PBB SHADOW E&M-EST. PATIENT-LVL III: CPT | Mod: PBBFAC,,, | Performed by: INTERNAL MEDICINE

## 2024-04-15 PROCEDURE — 3008F BODY MASS INDEX DOCD: CPT | Mod: CPTII,S$GLB,, | Performed by: INTERNAL MEDICINE

## 2024-04-15 PROCEDURE — 99215 OFFICE O/P EST HI 40 MIN: CPT | Mod: S$GLB,,, | Performed by: INTERNAL MEDICINE

## 2024-04-15 PROCEDURE — 3077F SYST BP >= 140 MM HG: CPT | Mod: CPTII,S$GLB,, | Performed by: INTERNAL MEDICINE

## 2024-04-15 NOTE — ASSESSMENT & PLAN NOTE
Nodular component is under 2 cm but surrounded by likely platelike atelectasis.   Likely a Stage 1A. PET scan.  -if PET confirms size < 2cm (ie atlectatic appearing areas are not involved) will not require EBUS.  -MRI Brain and PFTs  -Refer to Thoracic surgery.

## 2024-04-15 NOTE — PROGRESS NOTES
Subjective:       Patient ID: Cara Sharp is a 68 y.o. female.  The patient's last visit with me was on 3/7/2024.     No new complaints. Tolerated biopsy well.    We discussed results in detail. Discussed further workup needed. Discussed treatment options. Discussed interactions between her lung and breast cancer. All questions from patient and family answered.  Review of Systems    Objective:      Vitals:    04/15/24 1539   BP: (!) 148/81   Pulse: 76     Wt Readings from Last 3 Encounters:   04/15/24 78.6 kg (173 lb 4.5 oz)   04/09/24 77.1 kg (170 lb)   03/07/24 80 kg (176 lb 5.9 oz)     Temp Readings from Last 3 Encounters:   04/09/24 98.1 °F (36.7 °C) (Temporal)   11/28/23 98 °F (36.7 °C) (Oral)   08/16/23 98.1 °F (36.7 °C) (Oral)     BP Readings from Last 3 Encounters:   04/15/24 (!) 148/81   04/09/24 (!) 145/70   03/07/24 (!) 160/83     Pulse Readings from Last 3 Encounters:   04/15/24 76   04/09/24 74   03/07/24 77       Physical Exam   Constitutional: She appears well-developed and well-nourished.   Pulmonary/Chest: Effort normal.   Vitals reviewed.    CBC  Lab Results   Component Value Date    WBC 7.05 04/09/2024    HGB 13.9 04/09/2024    HCT 41.9 04/09/2024    MCV 93 04/09/2024     04/09/2024         CMP  Sodium   Date Value Ref Range Status   11/12/2021 142 136 - 145 mmol/L Final     Potassium   Date Value Ref Range Status   11/12/2021 3.8 3.5 - 5.1 mmol/L Final     Chloride   Date Value Ref Range Status   11/12/2021 105 95 - 110 mmol/L Final     CO2   Date Value Ref Range Status   11/12/2021 27 23 - 29 mmol/L Final     Glucose   Date Value Ref Range Status   11/12/2021 88 70 - 110 mg/dL Final     BUN   Date Value Ref Range Status   11/12/2021 11 8 - 23 mg/dL Final     Creatinine   Date Value Ref Range Status   11/12/2021 0.7 0.5 - 1.4 mg/dL Final     Calcium   Date Value Ref Range Status   11/12/2021 9.4 8.7 - 10.5 mg/dL Final     Total Protein   Date Value Ref Range Status   11/12/2021 7.5 6.0  "- 8.4 g/dL Final     Albumin   Date Value Ref Range Status   11/12/2021 4.0 3.5 - 5.2 g/dL Final     Total Bilirubin   Date Value Ref Range Status   11/12/2021 0.5 0.1 - 1.0 mg/dL Final     Comment:     For infants and newborns, interpretation of results should be based  on gestational age, weight and in agreement with clinical  observations.    Premature Infant recommended reference ranges:  Up to 24 hours.............<8.0 mg/dL  Up to 48 hours............<12.0 mg/dL  3-5 days..................<15.0 mg/dL  6-29 days.................<15.0 mg/dL       Alkaline Phosphatase   Date Value Ref Range Status   11/12/2021 65 55 - 135 U/L Final     AST   Date Value Ref Range Status   11/12/2021 18 10 - 40 U/L Final     ALT   Date Value Ref Range Status   11/12/2021 23 10 - 44 U/L Final     Anion Gap   Date Value Ref Range Status   11/12/2021 10 8 - 16 mmol/L Final       ABG  No results found for: "PH", "PO2", "PCO2"        Personal Diagnostic Review  I have personally reviewed the following data and added my own interpretation as below:  Pathology reviewed and shows adenocarcinoma consistent with lung primary      4/15/2024     3:39 PM 4/9/2024     1:45 PM 4/9/2024     1:15 PM 4/9/2024    12:45 PM 4/9/2024    12:15 PM 4/9/2024    11:45 AM 4/9/2024    11:30 AM   Pulmonary Function Tests   SpO2 96 % 99 % 100 % 100 % 100 % 100 % 98 %   Height 5' 6" (1.676 m)         Weight 78.6 kg (173 lb 4.5 oz)         BMI (Calculated) 28               Assessment:       1. NSCLC of left lung    2. Malignant neoplasm of upper-outer quadrant of left breast in female, estrogen receptor positive        Outpatient Encounter Medications as of 4/15/2024   Medication Sig Dispense Refill    atorvastatin (LIPITOR) 40 MG tablet Take 1 tablet by mouth every evening.      ciclopirox (PENLAC) 8 % Soln Apply topically nightly. Remove once a day with nail polish remover 6.6 mL 1    ergocalciferol (ERGOCALCIFEROL) 50,000 unit Cap TAKE 1 CAPSULE ONE TIME " WEEKLY      letrozole (FEMARA) 2.5 mg Tab TAKE 1 TABLET EVERY DAY 90 tablet 3    levothyroxine (SYNTHROID) 88 MCG tablet       paroxetine (PAXIL) 30 MG tablet Take 30 mg by mouth.       Facility-Administered Encounter Medications as of 4/15/2024   Medication Dose Route Frequency Provider Last Rate Last Admin    0.9%  NaCl infusion   Intravenous Continuous Shelbie Horowitz MD 70 mL/hr at 12/03/21 0630 New Bag at 12/03/21 0630    celecoxib capsule 400 mg  400 mg Oral Once Yonas Werner MD        fentaNYL 50 mcg/mL injection  mcg   mcg Intravenous PRN Yonas Werner MD   100 mcg at 12/03/21 0650    LIDOcaine (PF) 10 mg/ml (1%) injection 10 mg  1 mL Intradermal Once Yonas Werner MD        midazolam (VERSED) 1 mg/mL injection 0.5-4 mg  0.5-4 mg Intravenous PRN Yonas Werner MD   2 mg at 12/03/21 0650    [DISCONTINUED] 0.9%  NaCl infusion   Intravenous Continuous Brit Contreras PA-C        [DISCONTINUED] LIDOcaine (PF) 10 mg/ml (1%) injection 10 mg  1 mL Other Once Brit Contreras PA-C         1. NSCLC of left lung  - NM PET CT FDG Skull Base to Mid Thigh; Future  - Complete PFT with bronchodilator; Future  - MRI Brain W WO Contrast; Future  - Ambulatory referral/consult to Thoracic Surgery; Future  - Creatinine, serum; Future    2. Malignant neoplasm of upper-outer quadrant of left breast in female, estrogen receptor positive    Plan:     Problem List Items Addressed This Visit          Oncology    Malignant neoplasm of upper-outer quadrant of left breast in female, estrogen receptor positive    Current Assessment & Plan     Pathology with ER negative makes primary lung cancer the etiology, not metastasis.         NSCLC of left lung - Primary    Current Assessment & Plan     Nodular component is under 2 cm but surrounded by likely platelike atelectasis.   Likely a Stage 1A. PET scan.  -if PET confirms size < 2cm (ie atlectatic appearing areas are not involved)  will not require EBUS.  -MRI Brain and PFTs  -Refer to Thoracic surgery.         Relevant Orders    NM PET CT FDG Skull Base to Mid Thigh    Complete PFT with bronchodilator    MRI Brain W WO Contrast    Ambulatory referral/consult to Thoracic Surgery    Creatinine, serum   Extensive counseling of patient in regards to her diagnosis and to treatment options and current plan for curative intent. All questions answered    I spent a total of 50 minutes on the day of the visit.This includes face to face time and non-face to face time preparing to see the patient (eg, review of tests), obtaining and/or reviewing separately obtained history, documenting clinical information in the electronic or other health record, independently interpreting results and communicating results to the patient/family/caregiver, or care coordinator.      No follow-ups on file.    Future Appointments   Date Time Provider Department Center   5/14/2024 10:15 AM [facility]   6/27/2024  9:40 AM Jose Bean MD [facility] Sae Baca MD

## 2024-04-16 ENCOUNTER — TELEPHONE (OUTPATIENT)
Dept: HEMATOLOGY/ONCOLOGY | Facility: CLINIC | Age: 69
End: 2024-04-16
Payer: MEDICARE

## 2024-04-16 NOTE — TELEPHONE ENCOUNTER
I called patient to review [employer] testing, scheduled lab appt (confirmed date/time/date) and informed about their financial assistance. Mychart sent per pts request

## 2024-04-17 DIAGNOSIS — Z17.0 MALIGNANT NEOPLASM OF UPPER-OUTER QUADRANT OF LEFT BREAST IN FEMALE, ESTROGEN RECEPTOR POSITIVE: ICD-10-CM

## 2024-04-17 DIAGNOSIS — C50.412 MALIGNANT NEOPLASM OF UPPER-OUTER QUADRANT OF LEFT BREAST IN FEMALE, ESTROGEN RECEPTOR POSITIVE: ICD-10-CM

## 2024-04-18 RX ORDER — LETROZOLE 2.5 MG/1
TABLET, FILM COATED ORAL
Qty: 90 TABLET | Refills: 3 | Status: SHIPPED | OUTPATIENT
Start: 2024-04-18

## 2024-04-19 ENCOUNTER — LAB VISIT (OUTPATIENT)
Dept: LAB | Facility: HOSPITAL | Age: 69
End: 2024-04-19
Payer: MEDICARE

## 2024-04-19 DIAGNOSIS — C34.32 ADENOCARCINOMA OF LOWER LOBE OF LEFT LUNG: ICD-10-CM

## 2024-04-19 DIAGNOSIS — C34.92 NSCLC OF LEFT LUNG: ICD-10-CM

## 2024-04-19 LAB
CREAT SERPL-MCNC: 0.8 MG/DL (ref 0.5–1.4)
EST. GFR  (NO RACE VARIABLE): >60 ML/MIN/1.73 M^2

## 2024-04-19 PROCEDURE — 82565 ASSAY OF CREATININE: CPT | Performed by: INTERNAL MEDICINE

## 2024-04-19 PROCEDURE — 36415 COLL VENOUS BLD VENIPUNCTURE: CPT | Performed by: INTERNAL MEDICINE

## 2024-04-24 ENCOUNTER — TELEPHONE (OUTPATIENT)
Dept: HEMATOLOGY/ONCOLOGY | Facility: CLINIC | Age: 69
End: 2024-04-24
Payer: MEDICARE

## 2024-04-24 NOTE — TELEPHONE ENCOUNTER
Patient notified of the scheduled appointment with Dr. Cintron for 05/01/24.  Agreeable to date and time.

## 2024-04-25 ENCOUNTER — HOSPITAL ENCOUNTER (OUTPATIENT)
Dept: RADIOLOGY | Facility: HOSPITAL | Age: 69
Discharge: HOME | End: 2024-04-25
Attending: INTERNAL MEDICINE
Payer: MEDICARE

## 2024-04-25 ENCOUNTER — TELEPHONE (OUTPATIENT)
Dept: HEMATOLOGY/ONCOLOGY | Facility: CLINIC | Age: 69
End: 2024-04-25
Payer: MEDICARE

## 2024-04-25 DIAGNOSIS — C34.92 NSCLC OF LEFT LUNG: ICD-10-CM

## 2024-04-25 LAB — POCT GLUCOSE: 98 MG/DL (ref 70–110)

## 2024-04-25 PROCEDURE — 78815 PET IMAGE W/CT SKULL-THIGH: CPT | Mod: TC

## 2024-04-25 PROCEDURE — A9552 F18 FDG: HCPCS | Performed by: INTERNAL MEDICINE

## 2024-04-25 PROCEDURE — 78815 PET IMAGE W/CT SKULL-THIGH: CPT | Mod: 26,PI,, | Performed by: STUDENT IN AN ORGANIZED HEALTH CARE EDUCATION/TRAINING PROGRAM

## 2024-04-25 RX ORDER — FLUDEOXYGLUCOSE F18 500 MCI/ML
12 INJECTION INTRAVENOUS ONCE
Status: COMPLETED | OUTPATIENT
Start: 2024-04-25 | End: 2024-04-25

## 2024-04-25 RX ADMIN — FLUDEOXYGLUCOSE F-18 10.65 MILLICURIE: 500 INJECTION INTRAVENOUS at 12:04

## 2024-04-25 NOTE — TELEPHONE ENCOUNTER
----- Message from Jose Bean MD sent at 4/24/2024  2:04 PM CDT -----  Regarding: RE: Consult/Advisory  I think pulmonary ordered  ----- Message -----  From: Leighann Hale, BRIAN  Sent: 4/24/2024   2:00 PM CDT  To: Jose Bean MD  Subject: FW: Consult/Advisory                             Dr Bean looks like a tumor NGS blood and tissue was ordered. Does this need a peer to peer?  ----- Message -----  From: Henna Meyers  Sent: 4/24/2024   1:14 PM CDT  To: Vikas LOPEZ Staff  Subject: Consult/Advisory                                     Name Of Caller:   Peggy Munguia      Contact Preference: Phone #: [phone], Ext: [phone], Reference #: [id number]      Nature of call:  Ezra is requesting a peer to peer for the pt's genetic test. She states it's not FDA approved. Please call back before Friday 04/26/2024 at 1 pm EST.

## 2024-04-25 NOTE — PROGRESS NOTES
History & Physical    Subjective     History of Present Illness:  Patient is a 68 y.o. female never smoker with left breast DCIS s/p bilateral mastectomy (12/03/21) on letrozole, HLD, hypothyroidism here today for evaluation of left lower lobe adenocarcinoma. CT abdomen incidentally noted LLL pulmonary nodule during work-up for breast cancer. Followed with serial CTs which have shown interval growth. Percutaneous biopsy positive for adenocarcinoma. Today patient reports she is doing well. Anxious about visit; however, she is adamant about wanting surgery. Asymptomatic. Denies CP, SOB, cough, unintentional weight loss, syncope, dizziness, fatigue.     PSH: Carpal tunnel release, partial mastectomy with SLN, bilateral mastectomy, partial thyroidectomy  Meds: atorvastatin, letrozole, levothyroxine, paroxetine    Chief Complaint   Patient presents with    Consult       Review of patient's allergies indicates:   Allergen Reactions    Ibuprofen Hives and Shortness Of Breath       Current Outpatient Medications   Medication Sig Dispense Refill    atorvastatin (LIPITOR) 40 MG tablet Take 1 tablet by mouth every evening.      ciclopirox (PENLAC) 8 % Soln Apply topically nightly. Remove once a day with nail polish remover 6.6 mL 1    ergocalciferol (ERGOCALCIFEROL) 50,000 unit Cap TAKE 1 CAPSULE ONE TIME WEEKLY      letrozole (FEMARA) 2.5 mg Tab TAKE 1 TABLET EVERY DAY 90 tablet 3    levothyroxine (SYNTHROID) 88 MCG tablet       paroxetine (PAXIL) 30 MG tablet Take 30 mg by mouth.       No current facility-administered medications for this visit.     Facility-Administered Medications Ordered in Other Visits   Medication Dose Route Frequency Provider Last Rate Last Admin    0.9%  NaCl infusion   Intravenous Continuous Shelbie Horoiwtz MD 70 mL/hr at 12/03/21 0630 New Bag at 12/03/21 0630    celecoxib capsule 400 mg  400 mg Oral Once Yonas Werner MD        fentaNYL 50 mcg/mL injection  mcg   mcg  Intravenous PRN Yoans Werner MD   100 mcg at 12/03/21 0650    LIDOcaine (PF) 10 mg/ml (1%) injection 10 mg  1 mL Intradermal Once Yonas Werner MD        midazolam (VERSED) 1 mg/mL injection 0.5-4 mg  0.5-4 mg Intravenous PRN Yonas Werner MD   2 mg at 12/03/21 0650       Past Medical History:   Diagnosis Date    H/O mammogram 11/16/2017    Birads 2/B  @ DIS     Hearing impaired     History of bone density study 05/09/2018    Spine T-score: 0.9   //   Rt Hip T-score:  0.0   @ DIS     Hyperlipidemia     Menopause 2009    Occult blood positive stool     Pap smear for cervical cancer screening 08/31/2015    Negative/ Hpv Neg    Relies on partner vasectomy for contraception     Thyroid disease     nodules     Past Surgical History:   Procedure Laterality Date    CARPAL TUNNEL RELEASE Left 1997    COLONOSCOPY  10/07/2020    Diverticulitis ( Rtn 5 yrs) Dr Ashish Leal    DILATION AND CURETTAGE OF UTERUS  1985    LAPAROSCOPY FOR ENDOMETRIOSIS    INJECTION FOR SENTINEL NODE IDENTIFICATION Left 12/03/2021    Procedure: INJECTION, FOR SENTINEL NODE IDENTIFICATION-Left;  Surgeon: ERWIN Chi MD;  Location: [facility] OR;  Service: General;  Laterality: Left;    MASTECTOMY, PARTIAL Left 12/03/2021    bilateral mastectomy - Procedure: MASTECTOMY, PARTIAL-Left with radiological marker;  Surgeon: ERWIN Chi MD;  Location: [facility] OR;  Service: General;  Laterality: Left;    SENTINEL LYMPH NODE BIOPSY Left 12/03/2021    Procedure: BIOPSY, LYMPH NODE, SENTINEL-Left;  Surgeon: ERWIN Chi MD;  Location: [facility] OR;  Service: General;  Laterality: Left;    THYROIDECTOMY, PARTIAL  2011    [place], Dr Blackwell ([facility])    TONSILLECTOMY  1977     Family History   Problem Relation Name Age of Onset    Lung cancer Father  80    Hypertension Father      Hyperlipidemia Father      Cancer Father      Thyroid disease Mother      Hypertension Mother      Hyperlipidemia Mother      Ovarian cancer Maternal Aunt  52     "Cancer Maternal Aunt      Cervical cancer Sister          Pre Cancerous - Total Hyst done     Breast cancer Neg Hx      Colon cancer Neg Hx       Social History     Tobacco Use    Smoking status: Never    Smokeless tobacco: Never   Substance Use Topics    Alcohol use: Yes     Comment: Social    Drug use: No        Review of Systems:  Review of Systems   Constitutional:  Negative for chills, diaphoresis, fatigue, fever and unexpected weight change.   Respiratory: Negative.  Negative for cough, choking, shortness of breath, wheezing and stridor.    Cardiovascular: Negative.  Negative for chest pain, palpitations and leg swelling.   Skin: Negative.    Neurological: Negative.  Negative for dizziness, tremors, syncope, weakness and light-headedness.   Hematological: Negative.    Psychiatric/Behavioral: Negative.            Objective     Vital Signs (Most Recent)  Vitals:    24 0845   BP: (!) 151/84   Pulse: 80   SpO2: 95%   Weight: 79.5 kg (175 lb 4.3 oz)   Height: 5' 6" (1.676 m)   PainSc: 0-No pain       ECO - Fully Active     Physical Exam:  Physical Exam  Constitutional:       Appearance: Normal appearance.   Eyes:      Extraocular Movements: Extraocular movements intact.   Cardiovascular:      Rate and Rhythm: Normal rate and regular rhythm.      Pulses: Normal pulses.   Pulmonary:      Effort: Pulmonary effort is normal.      Breath sounds: Normal breath sounds.   Abdominal:      General: Abdomen is flat.      Palpations: Abdomen is soft.   Skin:     General: Skin is warm and dry.   Neurological:      General: No focal deficit present.      Mental Status: She is alert and oriented to person, place, and time. Mental status is at baseline.   Psychiatric:         Mood and Affect: Mood normal.         Behavior: Behavior normal.         Thought Content: Thought content normal.       Chest CT 24:  Lungs/Pleura: Dense bands of atelectasis versus scarring in the bilateral lung bases. Left lower lobe solid " pulmonary nodule measuring 1.4 x 1.7 cm (4-357), previously measuring 1.3 x 1.2 cm. New nodular opacity along the right lower lobe posterior pleura (4-305), likely related to atelectasis. No new suspicious pulmonary nodules. No pleural effusion or pneumothorax.     PET 4/25/24:   In the head and neck, there are no hypermetabolic lesions worrisome for malignancy. There are no hypermetabolic mucosal lesions, and there are no pathologically enlarged or hypermetabolic lymph nodes.  In the chest, there is a hypermetabolic solid nodule in the left lower lobe measuring 1.5 cm with max SUV 8.6 (image 97).  There are no pathologically enlarged or hypermetabolic lymph nodes.  In the abdomen and pelvis, there is physiologic tracer distribution within the abdominal organs and excretion into the genitourinary system.  In the bones, there are no hypermetabolic lesions worrisome for malignancy.  Two subcentimeter sclerotic foci in the left yanira-sacrum with no significant hypermetabolic activity, likely bone islands.  Additional CT findings: Partial thyroidectomy.  Bilateral mastectomy with reconstruction and left axillary kayla dissection.  Bibasilar bandlike opacities likely representing subsegmental atelectasis and/or scarring.  Trace left pleural effusion.  Cholelithiasis.  Left kidney simple cyst.  Colonic diverticulosis.  Postoperative change about the anterior abdominal wall.    PFTS  FEV1: 2.38 101%  DLCO: 18.1 80%    Assessment and Plan     Patient is a 68 y.o. female never smoker with left breast DCIS on letrozole, HLD, hypothyroidism here today for evaluation of left lower lobe adenocarcinoma.  Lobectomy is indicated due to the solid nature, histology and tumor location    PLAN:    Order pre-op labs, Stress ECHO  Scheduled for robotic left lower lobectomy with MLND  Consents   I have discussed the technical aspects, risks and benefits of the procedure with the patient.  I did inform the patient that the risks are the  most common risks and that there are other less likely risks that are too numerous to elaborate.  The patient is aware and has agreed to undergo the procedure as detailed on the consent form.

## 2024-04-25 NOTE — H&P (VIEW-ONLY)
History & Physical    Subjective     History of Present Illness:  Patient is a 68 y.o. female never smoker with left breast DCIS s/p bilateral mastectomy (12/03/21) on letrozole, HLD, hypothyroidism here today for evaluation of left lower lobe adenocarcinoma. CT abdomen incidentally noted LLL pulmonary nodule during work-up for breast cancer. Followed with serial CTs which have shown interval growth. Percutaneous biopsy positive for adenocarcinoma. Today patient reports she is doing well. Anxious about visit; however, she is adamant about wanting surgery. Asymptomatic. Denies CP, SOB, cough, unintentional weight loss, syncope, dizziness, fatigue.     PSH: Carpal tunnel release, partial mastectomy with SLN, bilateral mastectomy, partial thyroidectomy  Meds: atorvastatin, letrozole, levothyroxine, paroxetine    Chief Complaint   Patient presents with    Consult       Review of patient's allergies indicates:   Allergen Reactions    Ibuprofen Hives and Shortness Of Breath       Current Outpatient Medications   Medication Sig Dispense Refill    atorvastatin (LIPITOR) 40 MG tablet Take 1 tablet by mouth every evening.      ciclopirox (PENLAC) 8 % Soln Apply topically nightly. Remove once a day with nail polish remover 6.6 mL 1    ergocalciferol (ERGOCALCIFEROL) 50,000 unit Cap TAKE 1 CAPSULE ONE TIME WEEKLY      letrozole (FEMARA) 2.5 mg Tab TAKE 1 TABLET EVERY DAY 90 tablet 3    levothyroxine (SYNTHROID) 88 MCG tablet       paroxetine (PAXIL) 30 MG tablet Take 30 mg by mouth.       No current facility-administered medications for this visit.     Facility-Administered Medications Ordered in Other Visits   Medication Dose Route Frequency Provider Last Rate Last Admin    0.9%  NaCl infusion   Intravenous Continuous Shelbie Horowitz MD 70 mL/hr at 12/03/21 0630 New Bag at 12/03/21 0630    celecoxib capsule 400 mg  400 mg Oral Once Yonas Werner MD        fentaNYL 50 mcg/mL injection  mcg   mcg  Intravenous PRN Yonas Werner MD   100 mcg at 12/03/21 0650    LIDOcaine (PF) 10 mg/ml (1%) injection 10 mg  1 mL Intradermal Once Yonas Werner MD        midazolam (VERSED) 1 mg/mL injection 0.5-4 mg  0.5-4 mg Intravenous PRN Yonas Werner MD   2 mg at 12/03/21 0650       Past Medical History:   Diagnosis Date    H/O mammogram 11/16/2017    Birads 2/B  @ [facility]     Hearing impaired     History of bone density study 05/09/2018    Spine T-score: 0.9   //   Rt Hip T-score:  0.0   @ [facility]     Hyperlipidemia     Menopause 2009    Occult blood positive stool     Pap smear for cervical cancer screening 08/31/2015    Negative/ Hpv Neg    Relies on partner vasectomy for contraception     Thyroid disease     nodules     Past Surgical History:   Procedure Laterality Date    CARPAL TUNNEL RELEASE Left 1997    COLONOSCOPY  10/07/2020    Diverticulitis ( Rtn 5 yrs) Dr Ashish Leal    DILATION AND CURETTAGE OF UTERUS  1985    LAPAROSCOPY FOR ENDOMETRIOSIS    INJECTION FOR SENTINEL NODE IDENTIFICATION Left 12/03/2021    Procedure: INJECTION, FOR SENTINEL NODE IDENTIFICATION-Left;  Surgeon: ERWIN Chi MD;  Location: [facility] OR;  Service: General;  Laterality: Left;    MASTECTOMY, PARTIAL Left 12/03/2021    bilateral mastectomy - Procedure: MASTECTOMY, PARTIAL-Left with radiological marker;  Surgeon: ERWIN Chi MD;  Location: [facility] OR;  Service: General;  Laterality: Left;    SENTINEL LYMPH NODE BIOPSY Left 12/03/2021    Procedure: BIOPSY, LYMPH NODE, SENTINEL-Left;  Surgeon: ERWIN Chi MD;  Location: [facility] OR;  Service: General;  Laterality: Left;    THYROIDECTOMY, PARTIAL  2011    [place], Dr Blackwell ([facility])    TONSILLECTOMY  1977     Family History   Problem Relation Name Age of Onset    Lung cancer Father  80    Hypertension Father      Hyperlipidemia Father      Cancer Father      Thyroid disease Mother      Hypertension Mother      Hyperlipidemia Mother      Ovarian cancer Maternal Aunt  52     "Cancer Maternal Aunt      Cervical cancer Sister          Pre Cancerous - Total Hyst done     Breast cancer Neg Hx      Colon cancer Neg Hx       Social History     Tobacco Use    Smoking status: Never    Smokeless tobacco: Never   Substance Use Topics    Alcohol use: Yes     Comment: Social    Drug use: No        Review of Systems:  Review of Systems   Constitutional:  Negative for chills, diaphoresis, fatigue, fever and unexpected weight change.   Respiratory: Negative.  Negative for cough, choking, shortness of breath, wheezing and stridor.    Cardiovascular: Negative.  Negative for chest pain, palpitations and leg swelling.   Skin: Negative.    Neurological: Negative.  Negative for dizziness, tremors, syncope, weakness and light-headedness.   Hematological: Negative.    Psychiatric/Behavioral: Negative.            Objective     Vital Signs (Most Recent)  Vitals:    24 0845   BP: (!) 151/84   Pulse: 80   SpO2: 95%   Weight: 79.5 kg (175 lb 4.3 oz)   Height: 5' 6" (1.676 m)   PainSc: 0-No pain       ECO - Fully Active     Physical Exam:  Physical Exam  Constitutional:       Appearance: Normal appearance.   Eyes:      Extraocular Movements: Extraocular movements intact.   Cardiovascular:      Rate and Rhythm: Normal rate and regular rhythm.      Pulses: Normal pulses.   Pulmonary:      Effort: Pulmonary effort is normal.      Breath sounds: Normal breath sounds.   Abdominal:      General: Abdomen is flat.      Palpations: Abdomen is soft.   Skin:     General: Skin is warm and dry.   Neurological:      General: No focal deficit present.      Mental Status: She is alert and oriented to person, place, and time. Mental status is at baseline.   Psychiatric:         Mood and Affect: Mood normal.         Behavior: Behavior normal.         Thought Content: Thought content normal.       Chest CT 24:  Lungs/Pleura: Dense bands of atelectasis versus scarring in the bilateral lung bases. Left lower lobe solid " pulmonary nodule measuring 1.4 x 1.7 cm (4-357), previously measuring 1.3 x 1.2 cm. New nodular opacity along the right lower lobe posterior pleura (4-305), likely related to atelectasis. No new suspicious pulmonary nodules. No pleural effusion or pneumothorax.     PET 4/25/24:   In the head and neck, there are no hypermetabolic lesions worrisome for malignancy. There are no hypermetabolic mucosal lesions, and there are no pathologically enlarged or hypermetabolic lymph nodes.  In the chest, there is a hypermetabolic solid nodule in the left lower lobe measuring 1.5 cm with max SUV 8.6 (image 97).  There are no pathologically enlarged or hypermetabolic lymph nodes.  In the abdomen and pelvis, there is physiologic tracer distribution within the abdominal organs and excretion into the genitourinary system.  In the bones, there are no hypermetabolic lesions worrisome for malignancy.  Two subcentimeter sclerotic foci in the left yanira-sacrum with no significant hypermetabolic activity, likely bone islands.  Additional CT findings: Partial thyroidectomy.  Bilateral mastectomy with reconstruction and left axillary kayla dissection.  Bibasilar bandlike opacities likely representing subsegmental atelectasis and/or scarring.  Trace left pleural effusion.  Cholelithiasis.  Left kidney simple cyst.  Colonic diverticulosis.  Postoperative change about the anterior abdominal wall.    PFTS  FEV1: 2.38 101%  DLCO: 18.1 80%    Assessment and Plan     Patient is a 68 y.o. female never smoker with left breast DCIS on letrozole, HLD, hypothyroidism here today for evaluation of left lower lobe adenocarcinoma.  Lobectomy is indicated due to the solid nature, histology and tumor location    PLAN:    Order pre-op labs, Stress ECHO  Scheduled for robotic left lower lobectomy with MLND  Consents   I have discussed the technical aspects, risks and benefits of the procedure with the patient.  I did inform the patient that the risks are the  most common risks and that there are other less likely risks that are too numerous to elaborate.  The patient is aware and has agreed to undergo the procedure as detailed on the consent form.

## 2024-04-26 ENCOUNTER — PATIENT MESSAGE (OUTPATIENT)
Dept: PULMONOLOGY | Facility: CLINIC | Age: 69
End: 2024-04-26
Payer: MEDICARE

## 2024-04-28 LAB
DNA RANGE(S) EXAMINED NAR: NORMAL
GENE DIS ANL INTERP-IMP: NORMAL
GENE DIS ASSESSED: NORMAL
MSI CA SPEC-IMP: NOT DETECTED
REASON FOR STUDY: NORMAL
TEMPUS LCA: NORMAL
TEMPUS PORTAL: NORMAL
TEMPUS TRIAL1: NORMAL
TEMPUS TRIALCOUNT: 1

## 2024-04-29 ENCOUNTER — HOSPITAL ENCOUNTER (OUTPATIENT)
Dept: PULMONOLOGY | Facility: HOSPITAL | Age: 69
Discharge: HOME | End: 2024-04-29
Attending: INTERNAL MEDICINE
Payer: MEDICARE

## 2024-04-29 DIAGNOSIS — C34.92 NSCLC OF LEFT LUNG: ICD-10-CM

## 2024-04-29 LAB
DLCO ADJ PRE: 17.74 ML/(MIN*MMHG) (ref 16.61–28.07)
DLCO SINGLE BREATH LLN: 16.61
DLCO SINGLE BREATH PRE REF: 80.6 %
DLCO SINGLE BREATH REF: 22.34
DLCOC SBVA LLN: 2.97
DLCOC SBVA PRE REF: 80.5 %
DLCOC SBVA REF: 4.38
DLCOC SINGLE BREATH LLN: 16.61
DLCOC SINGLE BREATH PRE REF: 79.4 %
DLCOC SINGLE BREATH REF: 22.34
DLCOCSBVAULN: 5.78
DLCOCSINGLEBREATHULN: 28.07
DLCOSINGLEBREATHULN: 28.07
DLCOVA LLN: 2.97
DLCOVA PRE REF: 81.7 %
DLCOVA PRE: 3.58 ML/(MIN*MMHG*L) (ref 2.97–5.78)
DLCOVA REF: 4.38
DLCOVAULN: 5.78
DLVAADJ PRE: 3.52 ML/(MIN*MMHG*L) (ref 2.97–5.78)
DNA RANGE(S) EXAMINED NAR: NORMAL
ERV LLN: -16449.3
ERV PRE REF: 104.7 %
ERV REF: 0.7
ERVULN: ABNORMAL
FEF 25 75 LLN: 0.95
FEF 25 75 PRE REF: 83.5 %
FEF 25 75 REF: 2.01
FET100 CHG: -10.4 %
FEV05 LLN: 0.94
FEV05 REF: 1.79
FEV1 CHG: 8.2 %
FEV1 FVC LLN: 65
FEV1 FVC PRE REF: 91.8 %
FEV1 FVC REF: 78
FEV1 LLN: 1.73
FEV1 PRE REF: 101 %
FEV1 REF: 2.36
FEV1 VOL CHG: 0.2
FRCPLETH LLN: 1.94
FRCPLETH PREREF: 103.2 %
FRCPLETH REF: 2.77
FRCPLETHULN: 3.59
FVC CHG: -1.7 %
FVC LLN: 2.24
FVC PRE REF: 109.2 %
FVC REF: 3.03
FVC VOL CHG: -0.06
GENE DIS ANL INTERP-IMP: POSITIVE
GENE DIS ASSESSED: NORMAL
GENE MUT TESTED BLD/T: 2.6 M/MB
IVC PRE: 3.4 L (ref 2.24–3.87)
IVC SINGLE BREATH LLN: 2.24
IVC SINGLE BREATH PRE REF: 112.2 %
IVC SINGLE BREATH REF: 3.03
IVCSINGLEBREATHULN: 3.87
LLN IC: -16447.9
MSI CA SPEC-IMP: NORMAL
PD-L1 BY 22C3 TISS IMSTN DOC: POSITIVE
PEF LLN: 4.25
PEF PRE REF: 106.1 %
PEF REF: 6.02
PHYSICIAN COMMENT: ABNORMAL
POST FEF 25 75: 2.31 L/S (ref 0.95–3.48)
POST FET 100: 6.46 SEC
POST FEV1 FVC: 79.1 % (ref 65.39–89.39)
POST FEV1: 2.58 L (ref 1.73–2.96)
POST FEV5: 2.07 L (ref 0.94–2.65)
POST FVC: 3.26 L (ref 2.24–3.87)
POST PEF: 7.51 L/S (ref 4.25–7.78)
PRE DLCO: 18.01 ML/(MIN*MMHG) (ref 16.61–28.07)
PRE ERV: 0.74 L (ref -16449.3–16450.7)
PRE FEF 25 75: 1.68 L/S (ref 0.95–3.48)
PRE FET 100: 7.21 SEC
PRE FEV05 REF: 102 %
PRE FEV1 FVC: 71.87 % (ref 65.39–89.39)
PRE FEV1: 2.38 L (ref 1.73–2.96)
PRE FEV5: 1.83 L (ref 0.94–2.65)
PRE FRC PL: 2.85 L (ref 1.94–3.59)
PRE FVC: 3.31 L (ref 2.24–3.87)
PRE IC: 2.67 L (ref -16447.9–16452.1)
PRE PEF: 6.38 L/S (ref 4.25–7.78)
PRE REF IC: 126.9 %
PRE RV: 2.12 L (ref 1.48–2.64)
PRE TLC: 5.52 L (ref 4.12–6.09)
RAW PRE REF: 108.8 %
RAW PRE: 3.33 CMH2O*S/L (ref 3.06–3.06)
RAW REF: 3.06
REASON FOR STUDY: NORMAL
REF IC: 2.1
RV LLN: 1.48
RV PRE REF: 102.7 %
RV REF: 2.06
RVTLC LLN: 32
RVTLC PRE REF: 91.8 %
RVTLC PRE: 38.32 % (ref 32.15–51.33)
RVTLC REF: 42
RVTLCULN: 51
RVULN: 2.64
SGAW PRE REF: 85.9 %
SGAW PRE: 0.09 1/(CMH2O*S) (ref 0.1–0.1)
SGAW REF: 0.1
TEMPUS AMENDMENTNOTE1: NORMAL
TEMPUS FUSIONADDENDUM: NORMAL
TEMPUS LCA: NORMAL
TEMPUS PD-L1 (22C3) COMBINED POSITIVE SCORE: 2
TEMPUS PD-L1 (22C3) TUMOR PROPORTION SCORE: 1 %
TEMPUS PERTINENTNEGATIVES: NORMAL
TEMPUS PORTAL: NORMAL
TEMPUS THERAPY1: NORMAL
TEMPUS THERAPY2: NORMAL
TEMPUS THERAPY3: NORMAL
TEMPUS THERAPY4: NORMAL
TEMPUS THERAPY5: NORMAL
TEMPUS THERAPYCOUNT: 5
TEMPUS TRIAL1: NORMAL
TEMPUS TRIAL2: NORMAL
TEMPUS TRIAL3: NORMAL
TEMPUS TRIALCOUNT: 3
TLC LLN: 4.12
TLC PRE REF: 108.1 %
TLC REF: 5.11
TLC ULN: 6.09
ULN IC: ABNORMAL
VA PRE: 5.04 L (ref 4.96–4.96)
VA SINGLE BREATH LLN: 4.96
VA SINGLE BREATH PRE REF: 101.6 %
VA SINGLE BREATH REF: 4.96
VASINGLEBREATHULN: 4.96
VC LLN: 2.24
VC PRE REF: 112.2 %
VC PRE: 3.4 L (ref 2.24–3.87)
VC REF: 3.03
VC ULN: 3.87

## 2024-04-29 PROCEDURE — 94060 EVALUATION OF WHEEZING: CPT | Mod: 26,S$GLB,, | Performed by: INTERNAL MEDICINE

## 2024-04-29 PROCEDURE — 94729 DIFFUSING CAPACITY: CPT | Mod: 26,S$GLB,, | Performed by: INTERNAL MEDICINE

## 2024-04-29 PROCEDURE — 94726 PLETHYSMOGRAPHY LUNG VOLUMES: CPT | Mod: 26,S$GLB,, | Performed by: INTERNAL MEDICINE

## 2024-05-01 ENCOUNTER — OFFICE VISIT (OUTPATIENT)
Dept: CARDIOTHORACIC SURGERY | Facility: CLINIC | Age: 69
End: 2024-05-01
Payer: MEDICARE

## 2024-05-01 ENCOUNTER — HOSPITAL ENCOUNTER (OUTPATIENT)
Dept: RADIOLOGY | Facility: HOSPITAL | Age: 69
Discharge: HOME | End: 2024-05-01
Payer: MEDICARE

## 2024-05-01 VITALS
DIASTOLIC BLOOD PRESSURE: 84 MMHG | HEART RATE: 80 BPM | HEIGHT: 66 IN | BODY MASS INDEX: 28.16 KG/M2 | SYSTOLIC BLOOD PRESSURE: 151 MMHG | OXYGEN SATURATION: 95 % | WEIGHT: 175.25 LBS

## 2024-05-01 DIAGNOSIS — Z01.810 PRE-OPERATIVE CARDIOVASCULAR EXAMINATION, HIGH RISK SURGERY: ICD-10-CM

## 2024-05-01 DIAGNOSIS — C34.92 NSCLC OF LEFT LUNG: ICD-10-CM

## 2024-05-01 DIAGNOSIS — D68.9 COAGULOPATHY: Primary | ICD-10-CM

## 2024-05-01 PROCEDURE — 3079F DIAST BP 80-89 MM HG: CPT | Mod: CPTII,S$GLB,, | Performed by: THORACIC SURGERY (CARDIOTHORACIC VASCULAR SURGERY)

## 2024-05-01 PROCEDURE — 1159F MED LIST DOCD IN RCRD: CPT | Mod: CPTII,S$GLB,, | Performed by: THORACIC SURGERY (CARDIOTHORACIC VASCULAR SURGERY)

## 2024-05-01 PROCEDURE — 3077F SYST BP >= 140 MM HG: CPT | Mod: CPTII,S$GLB,, | Performed by: THORACIC SURGERY (CARDIOTHORACIC VASCULAR SURGERY)

## 2024-05-01 PROCEDURE — 3008F BODY MASS INDEX DOCD: CPT | Mod: CPTII,S$GLB,, | Performed by: THORACIC SURGERY (CARDIOTHORACIC VASCULAR SURGERY)

## 2024-05-01 PROCEDURE — 71250 CT THORAX DX C-: CPT | Mod: 26,,, | Performed by: STUDENT IN AN ORGANIZED HEALTH CARE EDUCATION/TRAINING PROGRAM

## 2024-05-01 PROCEDURE — 99205 OFFICE O/P NEW HI 60 MIN: CPT | Mod: S$GLB,,, | Performed by: THORACIC SURGERY (CARDIOTHORACIC VASCULAR SURGERY)

## 2024-05-01 PROCEDURE — 3288F FALL RISK ASSESSMENT DOCD: CPT | Mod: CPTII,S$GLB,, | Performed by: THORACIC SURGERY (CARDIOTHORACIC VASCULAR SURGERY)

## 2024-05-01 PROCEDURE — 1101F PT FALLS ASSESS-DOCD LE1/YR: CPT | Mod: CPTII,S$GLB,, | Performed by: THORACIC SURGERY (CARDIOTHORACIC VASCULAR SURGERY)

## 2024-05-01 PROCEDURE — 1126F AMNT PAIN NOTED NONE PRSNT: CPT | Mod: CPTII,S$GLB,, | Performed by: THORACIC SURGERY (CARDIOTHORACIC VASCULAR SURGERY)

## 2024-05-01 PROCEDURE — 71250 CT THORAX DX C-: CPT | Mod: TC

## 2024-05-01 PROCEDURE — 99999 PR PBB SHADOW E&M-EST. PATIENT-LVL V: CPT | Mod: PBBFAC,,, | Performed by: THORACIC SURGERY (CARDIOTHORACIC VASCULAR SURGERY)

## 2024-05-02 ENCOUNTER — HOSPITAL ENCOUNTER (OUTPATIENT)
Dept: CARDIOLOGY | Facility: HOSPITAL | Age: 69
Discharge: HOME | End: 2024-05-02
Payer: MEDICARE

## 2024-05-02 VITALS
HEART RATE: 67 BPM | WEIGHT: 175 LBS | HEIGHT: 66 IN | SYSTOLIC BLOOD PRESSURE: 164 MMHG | BODY MASS INDEX: 28.12 KG/M2 | DIASTOLIC BLOOD PRESSURE: 103 MMHG

## 2024-05-02 DIAGNOSIS — Z01.810 PRE-OPERATIVE CARDIOVASCULAR EXAMINATION, HIGH RISK SURGERY: ICD-10-CM

## 2024-05-02 LAB
CV PHARM DOSE: 0.4 MG
CV STRESS BASE HR: 67 BPM
DIASTOLIC BLOOD PRESSURE: 103 MMHG
EJECTION FRACTION- HIGH: 59 %
END DIASTOLIC INDEX-HIGH: 155 ML/M2
END DIASTOLIC INDEX-LOW: 91 ML/M2
END SYSTOLIC INDEX-HIGH: 78 ML/M2
END SYSTOLIC INDEX-LOW: 40 ML/M2
NUC REST DIASTOLIC VOLUME INDEX: 63
NUC REST EJECTION FRACTION: 56
NUC REST SYSTOLIC VOLUME INDEX: 28
NUC STRESS DIASTOLIC VOLUME INDEX: 65
NUC STRESS EJECTION FRACTION: 73 %
NUC STRESS SYSTOLIC VOLUME INDEX: 17
OHS CV CPX 1 MINUTE RECOVERY HEART RATE: 93 BPM
OHS CV CPX 85 PERCENT MAX PREDICTED HEART RATE MALE: 130
OHS CV CPX MAX PREDICTED HEART RATE: 153
OHS CV CPX PATIENT IS FEMALE: 1
OHS CV CPX PATIENT IS MALE: 0
OHS CV CPX PEAK DIASTOLIC BLOOD PRESSURE: 96 MMHG
OHS CV CPX PEAK HEAR RATE: 78 BPM
OHS CV CPX PEAK RATE PRESSURE PRODUCT: NORMAL
OHS CV CPX PEAK SYSTOLIC BLOOD PRESSURE: 187 MMHG
OHS CV CPX PERCENT MAX PREDICTED HEART RATE ACHIEVED: 53
OHS CV CPX RATE PRESSURE PRODUCT PRESENTING: NORMAL
RETIRED EF AND QEF - SEE NOTES: 47 %
SYSTOLIC BLOOD PRESSURE: 164 MMHG

## 2024-05-02 PROCEDURE — 93017 CV STRESS TEST TRACING ONLY: CPT

## 2024-05-02 PROCEDURE — 93016 CV STRESS TEST SUPVJ ONLY: CPT | Mod: ,,, | Performed by: INTERNAL MEDICINE

## 2024-05-02 PROCEDURE — 93018 CV STRESS TEST I&R ONLY: CPT | Mod: ,,, | Performed by: INTERNAL MEDICINE

## 2024-05-02 PROCEDURE — 63600175 PHARM REV CODE 636 W HCPCS

## 2024-05-02 PROCEDURE — 78452 HT MUSCLE IMAGE SPECT MULT: CPT | Mod: 26,,, | Performed by: INTERNAL MEDICINE

## 2024-05-02 PROCEDURE — A9502 TC99M TETROFOSMIN: HCPCS

## 2024-05-02 RX ORDER — REGADENOSON 0.08 MG/ML
0.4 INJECTION, SOLUTION INTRAVENOUS
Status: COMPLETED | OUTPATIENT
Start: 2024-05-02 | End: 2024-05-02

## 2024-05-02 RX ORDER — AMINOPHYLLINE 25 MG/ML
75 INJECTION, SOLUTION INTRAVENOUS ONCE
Status: COMPLETED | OUTPATIENT
Start: 2024-05-02 | End: 2024-05-02

## 2024-05-02 RX ADMIN — REGADENOSON 0.4 MG: 0.08 INJECTION, SOLUTION INTRAVENOUS at 08:05

## 2024-05-02 RX ADMIN — AMINOPHYLLINE 75 MG: 25 INJECTION, SOLUTION INTRAVENOUS at 08:05

## 2024-05-02 RX ADMIN — TETROFOSMIN 10.8 MILLICURIE: 1.38 INJECTION, POWDER, LYOPHILIZED, FOR SOLUTION INTRAVENOUS at 07:05

## 2024-05-02 RX ADMIN — TETROFOSMIN 31.9 MILLICURIE: 1.38 INJECTION, POWDER, LYOPHILIZED, FOR SOLUTION INTRAVENOUS at 08:05

## 2024-05-08 ENCOUNTER — HOSPITAL ENCOUNTER (OUTPATIENT)
Dept: RADIOLOGY | Facility: HOSPITAL | Age: 69
Discharge: HOME | DRG: 164 | End: 2024-05-08
Attending: INTERNAL MEDICINE
Payer: MEDICARE

## 2024-05-08 ENCOUNTER — ANESTHESIA EVENT (OUTPATIENT)
Dept: SURGERY | Facility: HOSPITAL | Age: 69
DRG: 164 | End: 2024-05-08
Payer: MEDICARE

## 2024-05-08 ENCOUNTER — TELEPHONE (OUTPATIENT)
Dept: CARDIOTHORACIC SURGERY | Facility: CLINIC | Age: 69
End: 2024-05-08
Payer: MEDICARE

## 2024-05-08 DIAGNOSIS — C34.92 NSCLC OF LEFT LUNG: ICD-10-CM

## 2024-05-08 PROCEDURE — 70553 MRI BRAIN STEM W/O & W/DYE: CPT | Mod: TC

## 2024-05-08 PROCEDURE — 70553 MRI BRAIN STEM W/O & W/DYE: CPT | Mod: 26,,, | Performed by: RADIOLOGY

## 2024-05-08 PROCEDURE — 25500020 PHARM REV CODE 255: Performed by: INTERNAL MEDICINE

## 2024-05-08 PROCEDURE — A9585 GADOBUTROL INJECTION: HCPCS | Performed by: INTERNAL MEDICINE

## 2024-05-08 RX ORDER — GADOBUTROL 604.72 MG/ML
8 INJECTION INTRAVENOUS
Status: COMPLETED | OUTPATIENT
Start: 2024-05-08 | End: 2024-05-08

## 2024-05-08 RX ADMIN — GADOBUTROL 8 ML: 604.72 INJECTION INTRAVENOUS at 12:05

## 2024-05-08 NOTE — PRE-PROCEDURE INSTRUCTIONS
PREOP INSTRUCTIONS:  No food,milk or milk products for 8 hours before surgery.  Clear liquids like water,gatorade,apple juice are allowed up until 2 hours before surgery.  Instructed to follow the surgeon's instructions if they differ from these.  Shower instructions as well as directions to the Surgery Center were given.  Encouraged to wear loose fitting,comfortable clothing.  Medication instructions for pm prior to and am of procedure reviewed.  Instructed to avoid taking vitamins,supplements,aspirin and ibuprofen the morning of surgery.    Patient denies any side effects or issues with anesthesia or sedation.     Patient does not know arrival time.Explained that this information comes from the surgeon's office and if they haven't heard from them by 2 or 3 pm to call the office.Patient stated an understanding.

## 2024-05-08 NOTE — ANESTHESIA PREPROCEDURE EVALUATION
[facility]  Anesthesia Pre-Operative Evaluation        Patient Name: Cara Sharp  YOB: 1955  MRN: [id number]    SUBJECTIVE:     Pre-operative Evaluation for Procedure(s) (LRB):  XI ROBOTIC RATS LEFT LOWER LOBECTOMY,LUNG (Left)  LYMPHADENECTOMY (N/A)     05/08/2024    Cara Sharp is a 68 y.o. female with a PMHx significant for hypothyroidism and left breast cancer (s/p bilateral mastectomy in Dec 2021, on letrozole) with a left lower lobe pulmonary nodule with biopsy-proven adenocarcinoma.     She now presents for the above procedure(s).    Previous Airway: None documented.    Patient Active Problem List   Diagnosis    Hyperlipidemia    Disorder of thyroid    Thickened endometrium    PMB (postmenopausal bleeding)    Malignant neoplasm of upper-outer quadrant of left breast in female, estrogen receptor positive    Decreased ROM of left shoulder    Decreased ROM of right shoulder    Impaired functional mobility and activity tolerance    Poor posture    Decreased strength    Pain in shoulder region, left    Pulmonary nodule    Decreased range of motion of spine    Weakness of trunk musculature    Dysfunctional movements    NSCLC of left lung       Review of patient's allergies indicates:   Allergen Reactions    Ibuprofen Hives and Shortness Of Breath       Current Outpatient Medications   Medication Instructions    atorvastatin (LIPITOR) 40 MG tablet 1 tablet, Oral, Nightly    ciclopirox (PENLAC) 8 % Soln Topical (Top), Nightly, Remove once a day with nail polish remover    ergocalciferol (ERGOCALCIFEROL) 50,000 unit Cap Oral, Every 7 days, WEDNESDAYS    letrozole (FEMARA) 2.5 mg Tab TAKE 1 TABLET EVERY DAY    levothyroxine (SYNTHROID) 88 MCG tablet Oral, Before breakfast    paroxetine (PAXIL) 30 mg, Oral, Every morning       Past Surgical History:   Procedure Laterality Date    CARPAL TUNNEL RELEASE Left 1997    COLONOSCOPY  10/07/2020    Diverticulitis ( Rtn 5 yrs)   Ashish Leal    DILATION AND CURETTAGE OF UTERUS  1985    LAPAROSCOPY FOR ENDOMETRIOSIS    INJECTION FOR SENTINEL NODE IDENTIFICATION Left 12/03/2021    Procedure: INJECTION, FOR SENTINEL NODE IDENTIFICATION-Left;  Surgeon: ERWIN Chi MD;  Location: [facility] OR;  Service: General;  Laterality: Left;    MASTECTOMY, PARTIAL Left 12/03/2021    bilateral mastectomy - Procedure: MASTECTOMY, PARTIAL-Left with radiological marker;  Surgeon: ERWIN Chi MD;  Location: [facility] OR;  Service: General;  Laterality: Left;    SENTINEL LYMPH NODE BIOPSY Left 12/03/2021    Procedure: BIOPSY, LYMPH NODE, SENTINEL-Left;  Surgeon: ERWIN Chi MD;  Location: [facility] OR;  Service: General;  Laterality: Left;    THYROIDECTOMY, PARTIAL  2011    Nodules, Dr Blackwell ([facility])    TONSILLECTOMY  1977       Social History     Substance and Sexual Activity   Drug Use No     Alcohol Use: Unknown (3/4/2024)    AUDIT-C     Frequency of Alcohol Consumption: Monthly or less     Average Number of Drinks: Patient declined     Frequency of Binge Drinking: Never     Tobacco Use: Low Risk  (5/1/2024)    Patient History     Smoking Tobacco Use: Never     Smokeless Tobacco Use: Never     Passive Exposure: Not on file       OBJECTIVE:     Vital Signs Range (Last 24H):         Significant Labs    Heme Profile  Lab Results   Component Value Date    WBC 6.69 05/01/2024    HGB 14.2 05/01/2024    HCT 41.0 05/01/2024     05/01/2024       Coagulation Studies  Lab Results   Component Value Date    LABPROT 11.5 05/01/2024    INR 1.1 05/01/2024    APTT 29.3 05/01/2024       BMP  Lab Results   Component Value Date     05/01/2024    K 4.1 05/01/2024     05/01/2024    CO2 28 05/01/2024    BUN 15 05/01/2024    CREATININE 0.7 05/01/2024       Liver Function Tests  Lab Results   Component Value Date    AST 27 05/01/2024    ALT 41 05/01/2024    ALKPHOS 89 05/01/2024    BILITOT 0.6 05/01/2024    PROT 7.6 05/01/2024    ALBUMIN 4.1 05/01/2024        Diagnostic Studies    CT Chest without Contrast (05/01/2024)  1. Stable size of solid nodule in left lower lobe and several pulmonary micronodules as above.  2. Cholelithiasis.  3. Unchanged sclerotic focus in the sternum.  4. Additional findings as above.    Cardiac Studies    EKG:   Results for orders placed or performed during the hospital encounter of 11/12/21   SCHEDULED EKG 12-LEAD (to Muse)    Collection Time: 11/12/21  3:12 PM    Narrative    Test Reason : Z01.818,    Vent. Rate : 099 BPM     Atrial Rate : 099 BPM     P-R Int : 138 ms          QRS Dur : 084 ms      QT Int : 362 ms       P-R-T Axes : 059 -61 053 degrees     QTc Int : 464 ms    Sinus rhythm with sinus arrhythmia with occasional Premature ventricular  complexes  Left anterior fascicular block  Abnormal ECG  No previous ECGs available  Confirmed by TONJA PRYOR MD ([id number]) on 11/12/2021 4:02:17 PM    Referred By: ERWIN MORRIS           Confirmed By:TONJA PRYOR MD         Nuclear Stress Echo (05/02/2024)  Interpretation Summary    Normal myocardial perfusion scan. There is no evidence of myocardial ischemia or infarction.    The gated perfusion images showed an ejection fraction of 56% at rest. The gated perfusion images showed an ejection fraction of 73% post stress. Normal ejection fraction is greater than 47%.    There is normal wall motion at rest.    LV cavity size is normal at rest.    The ECG portion of the study is negative for ischemia.    The patient reported no chest pain during the stress test.    There were no arrhythmias during stress.    There are no prior studies for comparison.      ASSESSMENT/PLAN:     Cara Sharp is a 68 y.o. female with LLL noduel presenting for rATS.        Pre-op Assessment    I have reviewed the Patient Summary Reports.     I have reviewed the Nursing Notes.    I have reviewed the Medications.     Review of Systems  Anesthesia Hx:  No problems with previous Anesthesia                 Hematology/Oncology:  Hematology Normal                     Current/Recent Cancer.  --  Cancer in past history:       Breast    left          EENT/Dental:  EENT/Dental Normal           Cardiovascular:  Cardiovascular Normal                                            Pulmonary:  Pulmonary Normal                       Renal/:  Renal/ Normal                 Hepatic/GI:  Hepatic/GI Normal                 Musculoskeletal:  Musculoskeletal Normal                Neurological:  Neurology Normal                                      Endocrine:  Endocrine Normal            Dermatological:  Skin Normal    Psych:  Psychiatric Normal                    Physical Exam  General: Well nourished    Airway:  Mallampati: II   Mouth Opening: Normal  TM Distance: Normal  Tongue: Normal  Neck ROM: Normal ROM    Dental:  Intact    Chest/Lungs:  Clear to auscultation, Normal Respiratory Rate    Heart:  Rate: Normal  Rhythm: Regular Rhythm  Sounds: Normal        Anesthesia Plan  Type of Anesthesia, risks & benefits discussed:    Anesthesia Type: Gen ETT  Intra-op Monitoring Plan: Standard ASA Monitors and Art Line  Post Op Pain Control Plan: multimodal analgesia, peripheral nerve block and IV/PO Opioids PRN  Induction:  IV  Airway Plan: Direct, Post-Induction  Informed Consent: Informed consent signed with the Patient and all parties understand the risks and agree with anesthesia plan.  All questions answered.   ASA Score: 3  Day of Surgery Review of History & Physical: H&P Update referred to the surgeon/provider.  Anesthesia Plan Notes: Discussed anesthetic plan including one lung ventilation with double lumen ETT or bronchial blocker, intraoperative planning, and possibility of post operative ventilation    Ready For Surgery From Anesthesia Perspective.     .

## 2024-05-09 ENCOUNTER — HOSPITAL ENCOUNTER (INPATIENT)
Facility: HOSPITAL | Age: 69
LOS: 2 days | Discharge: HOME | DRG: 164 | End: 2024-05-11
Attending: THORACIC SURGERY (CARDIOTHORACIC VASCULAR SURGERY) | Admitting: THORACIC SURGERY (CARDIOTHORACIC VASCULAR SURGERY)
Payer: MEDICARE

## 2024-05-09 ENCOUNTER — ANESTHESIA (OUTPATIENT)
Dept: SURGERY | Facility: HOSPITAL | Age: 69
DRG: 164 | End: 2024-05-09
Payer: MEDICARE

## 2024-05-09 DIAGNOSIS — C34.92 NSCLC OF LEFT LUNG: ICD-10-CM

## 2024-05-09 LAB
ABO + RH BLD: NORMAL
BASOPHILS # BLD AUTO: 0.03 K/UL (ref 0–0.2)
BASOPHILS NFR BLD: 0.3 % (ref 0–1.9)
BLD GP AB SCN CELLS X3 SERPL QL: NORMAL
CREAT SERPL-MCNC: 0.7 MG/DL (ref 0.5–1.4)
DIFFERENTIAL METHOD BLD: ABNORMAL
EOSINOPHIL # BLD AUTO: 0 K/UL (ref 0–0.5)
EOSINOPHIL NFR BLD: 0.1 % (ref 0–8)
ERYTHROCYTE [DISTWIDTH] IN BLOOD BY AUTOMATED COUNT: 12.5 % (ref 11.5–14.5)
EST. GFR  (NO RACE VARIABLE): >60 ML/MIN/1.73 M^2
HCT VFR BLD AUTO: 38.5 % (ref 37–48.5)
HGB BLD-MCNC: 13.2 G/DL (ref 12–16)
IMM GRANULOCYTES # BLD AUTO: 0.06 K/UL (ref 0–0.04)
IMM GRANULOCYTES NFR BLD AUTO: 0.5 % (ref 0–0.5)
LYMPHOCYTES # BLD AUTO: 0.6 K/UL (ref 1–4.8)
LYMPHOCYTES NFR BLD: 4.9 % (ref 18–48)
MCH RBC QN AUTO: 31.7 PG (ref 27–31)
MCHC RBC AUTO-ENTMCNC: 34.3 G/DL (ref 32–36)
MCV RBC AUTO: 93 FL (ref 82–98)
MONOCYTES # BLD AUTO: 0.2 K/UL (ref 0.3–1)
MONOCYTES NFR BLD: 1.5 % (ref 4–15)
NEUTROPHILS # BLD AUTO: 10.9 K/UL (ref 1.8–7.7)
NEUTROPHILS NFR BLD: 92.7 % (ref 38–73)
NRBC BLD-RTO: 0 /100 WBC
PLATELET # BLD AUTO: 223 K/UL (ref 150–450)
PMV BLD AUTO: 10.2 FL (ref 9.2–12.9)
RBC # BLD AUTO: 4.16 M/UL (ref 4–5.4)
SPECIMEN OUTDATE: NORMAL
WBC # BLD AUTO: 11.77 K/UL (ref 3.9–12.7)

## 2024-05-09 PROCEDURE — 88342 IMHCHEM/IMCYTCHM 1ST ANTB: CPT | Performed by: PATHOLOGY

## 2024-05-09 PROCEDURE — C9290 INJ, BUPIVACAINE LIPOSOME: HCPCS | Performed by: THORACIC SURGERY (CARDIOTHORACIC VASCULAR SURGERY)

## 2024-05-09 PROCEDURE — 63600175 PHARM REV CODE 636 W HCPCS: Performed by: STUDENT IN AN ORGANIZED HEALTH CARE EDUCATION/TRAINING PROGRAM

## 2024-05-09 PROCEDURE — 20600001 HC STEP DOWN PRIVATE ROOM

## 2024-05-09 PROCEDURE — D9220A PRA ANESTHESIA: Mod: AA,P3,GC, | Performed by: ANESTHESIOLOGY

## 2024-05-09 PROCEDURE — 71000015 HC POSTOP RECOV 1ST HR: Performed by: THORACIC SURGERY (CARDIOTHORACIC VASCULAR SURGERY)

## 2024-05-09 PROCEDURE — 88342 IMHCHEM/IMCYTCHM 1ST ANTB: CPT | Mod: 26,,, | Performed by: PATHOLOGY

## 2024-05-09 PROCEDURE — 27201037 HC PRESSURE MONITORING SET UP

## 2024-05-09 PROCEDURE — 25000003 PHARM REV CODE 250: Performed by: STUDENT IN AN ORGANIZED HEALTH CARE EDUCATION/TRAINING PROGRAM

## 2024-05-09 PROCEDURE — 25000242 PHARM REV CODE 250 ALT 637 W/ HCPCS

## 2024-05-09 PROCEDURE — 63600175 PHARM REV CODE 636 W HCPCS

## 2024-05-09 PROCEDURE — A4216 STERILE WATER/SALINE, 10 ML: HCPCS | Performed by: THORACIC SURGERY (CARDIOTHORACIC VASCULAR SURGERY)

## 2024-05-09 PROCEDURE — 32663 THORACOSCOPY W/LOBECTOMY: CPT | Mod: LT,,, | Performed by: THORACIC SURGERY (CARDIOTHORACIC VASCULAR SURGERY)

## 2024-05-09 PROCEDURE — 25000003 PHARM REV CODE 250

## 2024-05-09 PROCEDURE — 32674 THORACOSCOPY LYMPH NODE EXC: CPT | Mod: AS,,,

## 2024-05-09 PROCEDURE — 37000008 HC ANESTHESIA 1ST 15 MINUTES: Performed by: THORACIC SURGERY (CARDIOTHORACIC VASCULAR SURGERY)

## 2024-05-09 PROCEDURE — 88309 TISSUE EXAM BY PATHOLOGIST: CPT | Performed by: PATHOLOGY

## 2024-05-09 PROCEDURE — 71000016 HC POSTOP RECOV ADDL HR: Performed by: THORACIC SURGERY (CARDIOTHORACIC VASCULAR SURGERY)

## 2024-05-09 PROCEDURE — 25000003 PHARM REV CODE 250: Performed by: THORACIC SURGERY (CARDIOTHORACIC VASCULAR SURGERY)

## 2024-05-09 PROCEDURE — C1729 CATH, DRAINAGE: HCPCS | Performed by: THORACIC SURGERY (CARDIOTHORACIC VASCULAR SURGERY)

## 2024-05-09 PROCEDURE — 36415 COLL VENOUS BLD VENIPUNCTURE: CPT

## 2024-05-09 PROCEDURE — 86901 BLOOD TYPING SEROLOGIC RH(D): CPT

## 2024-05-09 PROCEDURE — 63600175 PHARM REV CODE 636 W HCPCS: Mod: JZ,JG | Performed by: THORACIC SURGERY (CARDIOTHORACIC VASCULAR SURGERY)

## 2024-05-09 PROCEDURE — 32674 THORACOSCOPY LYMPH NODE EXC: CPT | Mod: ,,, | Performed by: THORACIC SURGERY (CARDIOTHORACIC VASCULAR SURGERY)

## 2024-05-09 PROCEDURE — 88313 SPECIAL STAINS GROUP 2: CPT | Performed by: PATHOLOGY

## 2024-05-09 PROCEDURE — 8E0W4CZ ROBOTIC ASSISTED PROCEDURE OF TRUNK REGION, PERCUTANEOUS ENDOSCOPIC APPROACH: ICD-10-PCS | Performed by: THORACIC SURGERY (CARDIOTHORACIC VASCULAR SURGERY)

## 2024-05-09 PROCEDURE — 85025 COMPLETE CBC W/AUTO DIFF WBC: CPT | Performed by: THORACIC SURGERY (CARDIOTHORACIC VASCULAR SURGERY)

## 2024-05-09 PROCEDURE — 27201423 OPTIME MED/SURG SUP & DEVICES STERILE SUPPLY: Performed by: THORACIC SURGERY (CARDIOTHORACIC VASCULAR SURGERY)

## 2024-05-09 PROCEDURE — 88309 TISSUE EXAM BY PATHOLOGIST: CPT | Mod: 26,,, | Performed by: PATHOLOGY

## 2024-05-09 PROCEDURE — 36000712 HC OR TIME LEV V 1ST 15 MIN: Performed by: THORACIC SURGERY (CARDIOTHORACIC VASCULAR SURGERY)

## 2024-05-09 PROCEDURE — 86900 BLOOD TYPING SEROLOGIC ABO: CPT

## 2024-05-09 PROCEDURE — 94761 N-INVAS EAR/PLS OXIMETRY MLT: CPT

## 2024-05-09 PROCEDURE — 36000713 HC OR TIME LEV V EA ADD 15 MIN: Performed by: THORACIC SURGERY (CARDIOTHORACIC VASCULAR SURGERY)

## 2024-05-09 PROCEDURE — 71000033 HC RECOVERY, INTIAL HOUR: Performed by: THORACIC SURGERY (CARDIOTHORACIC VASCULAR SURGERY)

## 2024-05-09 PROCEDURE — 07B74ZX EXCISION OF THORAX LYMPHATIC, PERCUTANEOUS ENDOSCOPIC APPROACH, DIAGNOSTIC: ICD-10-PCS | Performed by: THORACIC SURGERY (CARDIOTHORACIC VASCULAR SURGERY)

## 2024-05-09 PROCEDURE — 88305 TISSUE EXAM BY PATHOLOGIST: CPT | Performed by: PATHOLOGY

## 2024-05-09 PROCEDURE — 88305 TISSUE EXAM BY PATHOLOGIST: CPT | Mod: 26,,, | Performed by: PATHOLOGY

## 2024-05-09 PROCEDURE — 82565 ASSAY OF CREATININE: CPT | Performed by: THORACIC SURGERY (CARDIOTHORACIC VASCULAR SURGERY)

## 2024-05-09 PROCEDURE — 37000009 HC ANESTHESIA EA ADD 15 MINS: Performed by: THORACIC SURGERY (CARDIOTHORACIC VASCULAR SURGERY)

## 2024-05-09 PROCEDURE — 94640 AIRWAY INHALATION TREATMENT: CPT

## 2024-05-09 PROCEDURE — 0BTJ4ZZ RESECTION OF LEFT LOWER LUNG LOBE, PERCUTANEOUS ENDOSCOPIC APPROACH: ICD-10-PCS | Performed by: THORACIC SURGERY (CARDIOTHORACIC VASCULAR SURGERY)

## 2024-05-09 PROCEDURE — 88341 IMHCHEM/IMCYTCHM EA ADD ANTB: CPT | Mod: 26,,, | Performed by: PATHOLOGY

## 2024-05-09 PROCEDURE — 32663 THORACOSCOPY W/LOBECTOMY: CPT | Mod: AS,LT,,

## 2024-05-09 PROCEDURE — 71000039 HC RECOVERY, EACH ADD'L HOUR: Performed by: THORACIC SURGERY (CARDIOTHORACIC VASCULAR SURGERY)

## 2024-05-09 PROCEDURE — 36620 INSERTION CATHETER ARTERY: CPT | Mod: 59,,, | Performed by: ANESTHESIOLOGY

## 2024-05-09 PROCEDURE — 88341 IMHCHEM/IMCYTCHM EA ADD ANTB: CPT | Performed by: PATHOLOGY

## 2024-05-09 PROCEDURE — 88313 SPECIAL STAINS GROUP 2: CPT | Mod: 26,,, | Performed by: PATHOLOGY

## 2024-05-09 RX ORDER — OXYCODONE HYDROCHLORIDE 10 MG/1
10 TABLET ORAL EVERY 4 HOURS PRN
Status: DISCONTINUED | OUTPATIENT
Start: 2024-05-09 | End: 2024-05-11 | Stop reason: HOSPADM

## 2024-05-09 RX ORDER — ENOXAPARIN SODIUM 100 MG/ML
40 INJECTION SUBCUTANEOUS EVERY 24 HOURS
Status: DISCONTINUED | OUTPATIENT
Start: 2024-05-09 | End: 2024-05-11 | Stop reason: HOSPADM

## 2024-05-09 RX ORDER — PHENYLEPHRINE HCL IN 0.9% NACL 1 MG/10 ML
SYRINGE (ML) INTRAVENOUS
Status: DISCONTINUED | OUTPATIENT
Start: 2024-05-09 | End: 2024-05-09

## 2024-05-09 RX ORDER — LIDOCAINE HYDROCHLORIDE 10 MG/ML
1 INJECTION, SOLUTION EPIDURAL; INFILTRATION; INTRACAUDAL; PERINEURAL ONCE
Status: DISCONTINUED | OUTPATIENT
Start: 2024-05-09 | End: 2024-05-09 | Stop reason: HOSPADM

## 2024-05-09 RX ORDER — FAMOTIDINE 20 MG/1
20 TABLET, FILM COATED ORAL 2 TIMES DAILY
Status: DISCONTINUED | OUTPATIENT
Start: 2024-05-09 | End: 2024-05-11 | Stop reason: HOSPADM

## 2024-05-09 RX ORDER — CEFAZOLIN 2 G/1
INJECTION, POWDER, FOR SOLUTION INTRAMUSCULAR; INTRAVENOUS
Status: DISCONTINUED | OUTPATIENT
Start: 2024-05-09 | End: 2024-05-09

## 2024-05-09 RX ORDER — LIDOCAINE HYDROCHLORIDE 20 MG/ML
INJECTION, SOLUTION EPIDURAL; INFILTRATION; INTRACAUDAL; PERINEURAL
Status: DISCONTINUED | OUTPATIENT
Start: 2024-05-09 | End: 2024-05-09

## 2024-05-09 RX ORDER — DEXAMETHASONE SODIUM PHOSPHATE 4 MG/ML
INJECTION, SOLUTION INTRA-ARTICULAR; INTRALESIONAL; INTRAMUSCULAR; INTRAVENOUS; SOFT TISSUE
Status: DISCONTINUED | OUTPATIENT
Start: 2024-05-09 | End: 2024-05-09

## 2024-05-09 RX ORDER — IPRATROPIUM BROMIDE AND ALBUTEROL SULFATE 2.5; .5 MG/3ML; MG/3ML
3 SOLUTION RESPIRATORY (INHALATION)
Status: DISCONTINUED | OUTPATIENT
Start: 2024-05-09 | End: 2024-05-11 | Stop reason: HOSPADM

## 2024-05-09 RX ORDER — HALOPERIDOL 5 MG/ML
0.5 INJECTION INTRAMUSCULAR EVERY 10 MIN PRN
Status: DISCONTINUED | OUTPATIENT
Start: 2024-05-09 | End: 2024-05-09 | Stop reason: HOSPADM

## 2024-05-09 RX ORDER — ACETAMINOPHEN 500 MG
1000 TABLET ORAL
Status: COMPLETED | OUTPATIENT
Start: 2024-05-09 | End: 2024-05-09

## 2024-05-09 RX ORDER — OXYCODONE HYDROCHLORIDE 5 MG/1
5 TABLET ORAL EVERY 4 HOURS PRN
Status: DISCONTINUED | OUTPATIENT
Start: 2024-05-09 | End: 2024-05-11 | Stop reason: HOSPADM

## 2024-05-09 RX ORDER — AMOXICILLIN 250 MG
1 CAPSULE ORAL DAILY
Status: DISCONTINUED | OUTPATIENT
Start: 2024-05-09 | End: 2024-05-11 | Stop reason: HOSPADM

## 2024-05-09 RX ORDER — ONDANSETRON HYDROCHLORIDE 2 MG/ML
INJECTION, SOLUTION INTRAVENOUS
Status: DISCONTINUED | OUTPATIENT
Start: 2024-05-09 | End: 2024-05-09

## 2024-05-09 RX ORDER — PROPOFOL 10 MG/ML
VIAL (ML) INTRAVENOUS
Status: DISCONTINUED | OUTPATIENT
Start: 2024-05-09 | End: 2024-05-09

## 2024-05-09 RX ORDER — BUPIVACAINE HYDROCHLORIDE 5 MG/ML
INJECTION, SOLUTION EPIDURAL; INTRACAUDAL
Status: DISCONTINUED | OUTPATIENT
Start: 2024-05-09 | End: 2024-05-09 | Stop reason: HOSPADM

## 2024-05-09 RX ORDER — METHOCARBAMOL 500 MG/1
500 TABLET, FILM COATED ORAL 4 TIMES DAILY
Status: DISCONTINUED | OUTPATIENT
Start: 2024-05-09 | End: 2024-05-11 | Stop reason: HOSPADM

## 2024-05-09 RX ORDER — ONDANSETRON 8 MG/1
8 TABLET, ORALLY DISINTEGRATING ORAL EVERY 8 HOURS PRN
Status: DISCONTINUED | OUTPATIENT
Start: 2024-05-09 | End: 2024-05-11 | Stop reason: HOSPADM

## 2024-05-09 RX ORDER — ACETAMINOPHEN 500 MG
1000 TABLET ORAL EVERY 8 HOURS
Status: DISCONTINUED | OUTPATIENT
Start: 2024-05-09 | End: 2024-05-11 | Stop reason: HOSPADM

## 2024-05-09 RX ORDER — DEXMEDETOMIDINE HYDROCHLORIDE 100 UG/ML
INJECTION, SOLUTION INTRAVENOUS
Status: DISCONTINUED | OUTPATIENT
Start: 2024-05-09 | End: 2024-05-09

## 2024-05-09 RX ORDER — METOCLOPRAMIDE HYDROCHLORIDE 5 MG/ML
5 INJECTION INTRAMUSCULAR; INTRAVENOUS EVERY 6 HOURS PRN
Status: DISCONTINUED | OUTPATIENT
Start: 2024-05-09 | End: 2024-05-11 | Stop reason: HOSPADM

## 2024-05-09 RX ORDER — METOPROLOL TARTRATE 25 MG/1
12.5 TABLET ORAL 2 TIMES DAILY
Status: DISCONTINUED | OUTPATIENT
Start: 2024-05-09 | End: 2024-05-11 | Stop reason: HOSPADM

## 2024-05-09 RX ORDER — SODIUM CHLORIDE 9 MG/ML
INJECTION, SOLUTION INTRAMUSCULAR; INTRAVENOUS; SUBCUTANEOUS
Status: DISCONTINUED | OUTPATIENT
Start: 2024-05-09 | End: 2024-05-09 | Stop reason: HOSPADM

## 2024-05-09 RX ORDER — BISACODYL 10 MG/1
10 SUPPOSITORY RECTAL DAILY PRN
Status: DISCONTINUED | OUTPATIENT
Start: 2024-05-09 | End: 2024-05-11 | Stop reason: HOSPADM

## 2024-05-09 RX ORDER — KETAMINE HCL IN 0.9 % NACL 50 MG/5 ML
SYRINGE (ML) INTRAVENOUS
Status: DISCONTINUED | OUTPATIENT
Start: 2024-05-09 | End: 2024-05-09

## 2024-05-09 RX ORDER — ROCURONIUM BROMIDE 10 MG/ML
INJECTION, SOLUTION INTRAVENOUS
Status: DISCONTINUED | OUTPATIENT
Start: 2024-05-09 | End: 2024-05-09

## 2024-05-09 RX ORDER — FENTANYL CITRATE 50 UG/ML
INJECTION, SOLUTION INTRAMUSCULAR; INTRAVENOUS
Status: DISCONTINUED | OUTPATIENT
Start: 2024-05-09 | End: 2024-05-09

## 2024-05-09 RX ORDER — GABAPENTIN 300 MG/1
300 CAPSULE ORAL NIGHTLY
Status: DISCONTINUED | OUTPATIENT
Start: 2024-05-09 | End: 2024-05-11 | Stop reason: HOSPADM

## 2024-05-09 RX ORDER — POLYETHYLENE GLYCOL 3350 17 G/17G
17 POWDER, FOR SOLUTION ORAL DAILY
Status: DISCONTINUED | OUTPATIENT
Start: 2024-05-09 | End: 2024-05-11 | Stop reason: HOSPADM

## 2024-05-09 RX ORDER — HYDROMORPHONE HYDROCHLORIDE 1 MG/ML
0.2 INJECTION, SOLUTION INTRAMUSCULAR; INTRAVENOUS; SUBCUTANEOUS EVERY 5 MIN PRN
Status: DISCONTINUED | OUTPATIENT
Start: 2024-05-09 | End: 2024-05-09 | Stop reason: HOSPADM

## 2024-05-09 RX ADMIN — SENNOSIDES AND DOCUSATE SODIUM 1 TABLET: 50; 8.6 TABLET ORAL at 12:05

## 2024-05-09 RX ADMIN — GABAPENTIN 400 MG: 300 CAPSULE ORAL at 06:05

## 2024-05-09 RX ADMIN — IPRATROPIUM BROMIDE AND ALBUTEROL SULFATE 3 ML: 2.5; .5 SOLUTION RESPIRATORY (INHALATION) at 08:05

## 2024-05-09 RX ADMIN — FAMOTIDINE 20 MG: 20 TABLET ORAL at 03:05

## 2024-05-09 RX ADMIN — OXYCODONE HYDROCHLORIDE 10 MG: 10 TABLET ORAL at 04:05

## 2024-05-09 RX ADMIN — METHOCARBAMOL 500 MG: 500 TABLET ORAL at 12:05

## 2024-05-09 RX ADMIN — ROCURONIUM BROMIDE 20 MG: 10 INJECTION, SOLUTION INTRAVENOUS at 07:05

## 2024-05-09 RX ADMIN — SODIUM CHLORIDE, SODIUM GLUCONATE, SODIUM ACETATE, POTASSIUM CHLORIDE, MAGNESIUM CHLORIDE, SODIUM PHOSPHATE, DIBASIC, AND POTASSIUM PHOSPHATE: .53; .5; .37; .037; .03; .012; .00082 INJECTION, SOLUTION INTRAVENOUS at 07:05

## 2024-05-09 RX ADMIN — Medication 50 MCG: at 07:05

## 2024-05-09 RX ADMIN — METOPROLOL TARTRATE 12.5 MG: 25 TABLET, FILM COATED ORAL at 05:05

## 2024-05-09 RX ADMIN — ROCURONIUM BROMIDE 10 MG: 10 INJECTION, SOLUTION INTRAVENOUS at 10:05

## 2024-05-09 RX ADMIN — Medication 100 MCG: at 09:05

## 2024-05-09 RX ADMIN — ROCURONIUM BROMIDE 20 MG: 10 INJECTION, SOLUTION INTRAVENOUS at 08:05

## 2024-05-09 RX ADMIN — Medication 100 MCG: at 11:05

## 2024-05-09 RX ADMIN — CEFAZOLIN 2 G: 2 INJECTION, POWDER, FOR SOLUTION INTRAMUSCULAR; INTRAVENOUS at 04:05

## 2024-05-09 RX ADMIN — METHOCARBAMOL 500 MG: 500 TABLET ORAL at 04:05

## 2024-05-09 RX ADMIN — Medication 20 MG: at 07:05

## 2024-05-09 RX ADMIN — OXYCODONE HYDROCHLORIDE 10 MG: 10 TABLET ORAL at 12:05

## 2024-05-09 RX ADMIN — ENOXAPARIN SODIUM 40 MG: 40 INJECTION SUBCUTANEOUS at 04:05

## 2024-05-09 RX ADMIN — PROPOFOL 30 MG: 10 INJECTION, EMULSION INTRAVENOUS at 08:05

## 2024-05-09 RX ADMIN — FAMOTIDINE 20 MG: 20 TABLET ORAL at 09:05

## 2024-05-09 RX ADMIN — SODIUM CHLORIDE: 0.9 INJECTION, SOLUTION INTRAVENOUS at 06:05

## 2024-05-09 RX ADMIN — Medication 150 MCG: at 11:05

## 2024-05-09 RX ADMIN — PHENYLEPHRINE HYDROCHLORIDE 0.3 MCG/KG/MIN: 10 INJECTION INTRAVENOUS at 08:05

## 2024-05-09 RX ADMIN — LIDOCAINE HYDROCHLORIDE 80 MG: 20 INJECTION, SOLUTION EPIDURAL; INFILTRATION; INTRACAUDAL at 07:05

## 2024-05-09 RX ADMIN — ACETAMINOPHEN 1000 MG: 500 TABLET ORAL at 09:05

## 2024-05-09 RX ADMIN — Medication 100 MCG: at 08:05

## 2024-05-09 RX ADMIN — POLYETHYLENE GLYCOL 3350 17 G: 17 POWDER, FOR SOLUTION ORAL at 12:05

## 2024-05-09 RX ADMIN — ACETAMINOPHEN 1000 MG: 500 TABLET ORAL at 06:05

## 2024-05-09 RX ADMIN — HYDROMORPHONE HYDROCHLORIDE 0.2 MG: 1 INJECTION, SOLUTION INTRAMUSCULAR; INTRAVENOUS; SUBCUTANEOUS at 12:05

## 2024-05-09 RX ADMIN — CEFAZOLIN 2 G: 330 INJECTION, POWDER, FOR SOLUTION INTRAMUSCULAR; INTRAVENOUS at 07:05

## 2024-05-09 RX ADMIN — ACETAMINOPHEN 1000 MG: 500 TABLET ORAL at 03:05

## 2024-05-09 RX ADMIN — FENTANYL CITRATE 100 MCG: 50 INJECTION INTRAMUSCULAR; INTRAVENOUS at 07:05

## 2024-05-09 RX ADMIN — Medication 10 MG: at 09:05

## 2024-05-09 RX ADMIN — CEFAZOLIN 2 G: 2 INJECTION, POWDER, FOR SOLUTION INTRAMUSCULAR; INTRAVENOUS at 11:05

## 2024-05-09 RX ADMIN — PROPOFOL 130 MG: 10 INJECTION, EMULSION INTRAVENOUS at 07:05

## 2024-05-09 RX ADMIN — DEXMEDETOMIDINE 8 MCG: 100 INJECTION, SOLUTION, CONCENTRATE INTRAVENOUS at 10:05

## 2024-05-09 RX ADMIN — ROCURONIUM BROMIDE 10 MG: 10 INJECTION, SOLUTION INTRAVENOUS at 09:05

## 2024-05-09 RX ADMIN — DEXAMETHASONE SODIUM PHOSPHATE 8 MG: 4 INJECTION INTRA-ARTICULAR; INTRALESIONAL; INTRAMUSCULAR; INTRAVENOUS; SOFT TISSUE at 07:05

## 2024-05-09 RX ADMIN — Medication 100 MCG: at 07:05

## 2024-05-09 RX ADMIN — ONDANSETRON 4 MG: 2 INJECTION INTRAMUSCULAR; INTRAVENOUS at 11:05

## 2024-05-09 RX ADMIN — METHOCARBAMOL 500 MG: 500 TABLET ORAL at 09:05

## 2024-05-09 RX ADMIN — GABAPENTIN 300 MG: 300 CAPSULE ORAL at 09:05

## 2024-05-09 RX ADMIN — ROCURONIUM BROMIDE 60 MG: 10 INJECTION, SOLUTION INTRAVENOUS at 07:05

## 2024-05-09 RX ADMIN — DEXMEDETOMIDINE 4 MCG: 100 INJECTION, SOLUTION, CONCENTRATE INTRAVENOUS at 11:05

## 2024-05-09 RX ADMIN — Medication 10 MG: at 07:05

## 2024-05-09 RX ADMIN — Medication 10 MG: at 10:05

## 2024-05-09 RX ADMIN — SUGAMMADEX 400 MG: 100 INJECTION, SOLUTION INTRAVENOUS at 11:05

## 2024-05-09 NOTE — ANESTHESIA POSTPROCEDURE EVALUATION
Anesthesia Post Evaluation    Patient: Cara Sharp    Procedure(s) Performed: Procedure(s) (LRB):  XI ROBOTIC RATS LEFT LOWER LOBECTOMY,LUNG (Left)  LYMPHADENECTOMY (N/A)  BLOCK, NERVE, INTERCOSTAL, 2 OR MORE    Final Anesthesia Type: general      Patient location during evaluation: PACU  Patient participation: Yes- Able to Participate  Level of consciousness: awake and alert  Post-procedure vital signs: reviewed and stable  Pain management: adequate  Airway patency: patent    PONV status at discharge: No PONV  Anesthetic complications: no      Cardiovascular status: blood pressure returned to baseline  Respiratory status: unassisted  Hydration status: euvolemic  Follow-up not needed.              Vitals Value Taken Time   /58 05/09/24 1246   Temp 36.5 °C (97.7 °F) 05/09/24 1133   Pulse 77 05/09/24 1253   Resp 25 05/09/24 1253   SpO2 96 % 05/09/24 1253   Vitals shown include unfiled device data.      No case tracking events are documented in the log.      Pain/Birgit Score: Pain Rating Prior to Med Admin: 8 (5/9/2024 12:22 PM)  Birgit Score: 6 (5/9/2024 11:45 AM)

## 2024-05-09 NOTE — ANESTHESIA PROCEDURE NOTES
Intubation    Date/Time: 5/9/2024 7:12 AM    Performed by: Timi Del Valle MD  Authorized by: Meng Shelton MD    Intubation:     Induction:  Intravenous    Intubated:  Postinduction    Mask Ventilation:  Easy with oral airway    Attempts:  1    Attempted By:  Resident anesthesiologist    Method of Intubation:  Video laryngoscopy    Blade:  Peña 3    Laryngeal View Grade: Grade I - full view of cords      Difficult Airway Encountered?: No      Complications:  None    Airway Device:  Double lumen tube left    Airway Device Size:  37F    Style/Cuff Inflation:  Cuffed    Tube secured:  28    Secured at:  The teeth    Placement Verified By:  Capnometry, Revisualization with laryngoscopy and Fiber optic visualization    Complicating Factors:  None    Findings Post-Intubation:  BS equal bilateral and atraumatic/condition of teeth unchanged

## 2024-05-09 NOTE — TRANSFER OF CARE
"Anesthesia Transfer of Care Note    Patient: Cara Sharp    Procedure(s) Performed: Procedure(s) (LRB):  XI ROBOTIC RATS LEFT LOWER LOBECTOMY,LUNG (Left)  LYMPHADENECTOMY (N/A)  BLOCK, NERVE, INTERCOSTAL, 2 OR MORE    Patient location: PACU    Anesthesia Type: general    Transport from OR: Transported from OR on 6-10 L/min O2 by face mask with adequate spontaneous ventilation    Post pain: adequate analgesia    Post assessment: no apparent anesthetic complications and tolerated procedure well    Post vital signs: stable    Level of consciousness: responds to stimulation    Nausea/Vomiting: no nausea/vomiting    Complications: none    Transfer of care protocol was followed      Last vitals: Visit Vitals  BP (!) 161/65 (BP Location: Right arm, Patient Position: Lying)   Pulse 70   Temp 36.8 °C (98.3 °F) (Oral)   Resp 18   Ht 5' 5" (1.651 m)   Wt 77.1 kg (170 lb)   LMP 04/24/2009 (Exact Date)   SpO2 96%   Breastfeeding No   BMI 28.29 kg/m²     "

## 2024-05-09 NOTE — PROGRESS NOTES
Nursing Transfer Note      Reason patient is being transferred: post op    Transfer To: 1021    Transfer via bed    Transfer with cardiac monitoring via centralized telemetry    Transported by RN and PCT    Medicines sent: none    Any special needs or follow-up needed: chest tube to water seal upon arrival to the room    Chart send with patient: Yes    Notified: family via surgical texting system     Patient reassessed at: 5/9/2024 3:00 PM

## 2024-05-09 NOTE — OP NOTE
Date of Surgery: 5/9/24  Preoperative Diagnosis:  Left lower lobe adenocarcinoma  Postoperative Diagnosis:  Same  Procedure:  Robotic assisted left lower lobectomy, mediastinal lymph node dissection, intercostal nerve block 2 or more levels  Surgeon: Aron Cintron MD  First Assistant: Aron Zaldivar DO  Bedside assistant: Meng Church PA-C  Anesthesia: GETA, 7 level intercostal Exparel/Marcaine/saline  EBL: 10mL    Surgery in Detail:    The patient has a diagnosis of localized left lower lobe adenocarcinoma.  Left lower lobectomy is indicated for definitive management.      The patient was taken to the operating room placed supine and identified.  General anesthesia was established with double-lumen tube placement.  Tube positioning was confirmed fiberoptically.  The patient was positioned in a right lateral decubitus position, all pressure points were padded and the left lung was isolated.  The left chest was prepped and draped and a time-out was performed.  A combination of 8 and 12 mm robotic ports were placed in the 8th interspace between the anterior axillary line and the paravertebral gutter.  A 12 mm assistant port was placed in the 10th interspace posterior axillary line.  Carbon dioxide was insufflated and a 7 level intercostal nerve block was performed.  The robot was docked.  There was no evidence of carcinomatosis upon thoracoscopic exploration.  The inferior pulmonary ligament was divided along with the posterior, suprahilar and AP window hilar pleura.  The subcarinal space was explored and a level 7 node harvested.  A posterior level 11 node was harvested.  A level 5 kayla packet harvested at the AP window.  The fissure was completed along the superior and inferior margins and multiple level 11 nodes were harvested near the pulmonary artery.  The lower lobe branches of the pulmonary artery were mobilized and divided separately using 2 robotic vascular loads.  An anterior level 10 lymph node was  harvested and the left inferior pulmonary vein was mobilized and divided using a robotic vascular load. The lower lobe bronchus was divided with a green load after performing a clamp test.  Hemostatic gauze was placed along the mediastinal and hilar dissection bed and hemostasis was assured.  The left lower lobe specimen was placed into a large endobag and extracted through an enlarged anterior robotic port.  A 24 German Timbo drain was inserted and secured with silk suture.  All robotic instrumentation and ports were removed and all port sites were hemostatic.  Left lung ventilation was restored with complete left upper lobe expansion.  All all incision  Sites were closed in multiple layers using absorbable suture.  A sterile dressing was applied.  The patient was extubated and transported to the PACU stable.    Bedside assistant attestation: Meng Ingram PA-C functioned as a bedside 1st assistant.  His duties included robotic docking, instrument exchange, and specimen retrieval throughout the entire surgery.  There was no qualified resident available to function as a bedside first assistant given the case complexity and extent of duties described above.     Attending attestation: I was present for and either directly assisted with or performed the critical and key portions of the procedure.    Thoracic (Pulmonary) Cancer - Synoptic Operative Report Summary    Side of surgery Left   Surgical approach Robotic   Tumor type NSCLC - Adenocarcinoma   Which lymph nodes were submitted as separate specimens? 5 - Subaortic, 7 - Subcarinal, 10L - Hilar (left), and 11L - Interlobar (left)   What pre-operative therapies did the patient receive? None

## 2024-05-09 NOTE — ANESTHESIA PROCEDURE NOTES
Arterial    Diagnosis: Pulmonary Nodule    Patient location during procedure: done in OR    Staffing  Authorizing Provider: Meng Shelton MD  Performing Provider: Timi Del Valle MD    Staffing  Performed by: Timi Del Valle MD  Authorized by: Meng Shelton MD    Anesthesiologist was present at the time of the procedure.    Preanesthetic Checklist  Completed: patient identified, IV checked, site marked, risks and benefits discussed, surgical consent, monitors and equipment checked, pre-op evaluation, timeout performed and anesthesia consent givenArterial  Skin Prep: chlorhexidine gluconate  Local Infiltration: none  Orientation: right  Location: radial    Catheter Size: 20 G  Catheter placement by Ultrasound guidance. Heme positive aspiration all ports.   Vessel Caliber: small, patent, compressibility normal  Needle advanced into vessel with real time Ultrasound guidance.Insertion Attempts: 1  Assessment  Dressing: secured with tape and tegaderm  Patient: Tolerated well

## 2024-05-10 PROCEDURE — 25000003 PHARM REV CODE 250

## 2024-05-10 PROCEDURE — 25000242 PHARM REV CODE 250 ALT 637 W/ HCPCS

## 2024-05-10 PROCEDURE — 94640 AIRWAY INHALATION TREATMENT: CPT

## 2024-05-10 PROCEDURE — 20600001 HC STEP DOWN PRIVATE ROOM

## 2024-05-10 PROCEDURE — 94799 UNLISTED PULMONARY SVC/PX: CPT | Mod: XB

## 2024-05-10 PROCEDURE — 63600175 PHARM REV CODE 636 W HCPCS

## 2024-05-10 PROCEDURE — 94761 N-INVAS EAR/PLS OXIMETRY MLT: CPT

## 2024-05-10 RX ADMIN — ENOXAPARIN SODIUM 40 MG: 40 INJECTION SUBCUTANEOUS at 05:05

## 2024-05-10 RX ADMIN — GABAPENTIN 300 MG: 300 CAPSULE ORAL at 09:05

## 2024-05-10 RX ADMIN — ACETAMINOPHEN 1000 MG: 500 TABLET ORAL at 05:05

## 2024-05-10 RX ADMIN — OXYCODONE HYDROCHLORIDE 10 MG: 10 TABLET ORAL at 06:05

## 2024-05-10 RX ADMIN — FAMOTIDINE 20 MG: 20 TABLET ORAL at 09:05

## 2024-05-10 RX ADMIN — METHOCARBAMOL 500 MG: 500 TABLET ORAL at 09:05

## 2024-05-10 RX ADMIN — SENNOSIDES AND DOCUSATE SODIUM 1 TABLET: 50; 8.6 TABLET ORAL at 09:05

## 2024-05-10 RX ADMIN — OXYCODONE HYDROCHLORIDE 10 MG: 10 TABLET ORAL at 11:05

## 2024-05-10 RX ADMIN — POLYETHYLENE GLYCOL 3350 17 G: 17 POWDER, FOR SOLUTION ORAL at 09:05

## 2024-05-10 RX ADMIN — ACETAMINOPHEN 1000 MG: 500 TABLET ORAL at 01:05

## 2024-05-10 RX ADMIN — METHOCARBAMOL 500 MG: 500 TABLET ORAL at 01:05

## 2024-05-10 RX ADMIN — METOPROLOL TARTRATE 12.5 MG: 25 TABLET, FILM COATED ORAL at 09:05

## 2024-05-10 RX ADMIN — IPRATROPIUM BROMIDE AND ALBUTEROL SULFATE 3 ML: 2.5; .5 SOLUTION RESPIRATORY (INHALATION) at 07:05

## 2024-05-10 RX ADMIN — IPRATROPIUM BROMIDE AND ALBUTEROL SULFATE 3 ML: 2.5; .5 SOLUTION RESPIRATORY (INHALATION) at 01:05

## 2024-05-10 RX ADMIN — OXYCODONE HYDROCHLORIDE 10 MG: 10 TABLET ORAL at 05:05

## 2024-05-10 RX ADMIN — ACETAMINOPHEN 1000 MG: 500 TABLET ORAL at 09:05

## 2024-05-10 RX ADMIN — METHOCARBAMOL 500 MG: 500 TABLET ORAL at 05:05

## 2024-05-10 NOTE — PROGRESS NOTES
[facility] - [facility]  General Surgery  Progress Note    Subjective:     History of Present Illness:  No notes on file    Post-Op Info:  Procedure(s) (LRB):  XI ROBOTIC RATS LEFT LOWER LOBECTOMY,LUNG (Left)  LYMPHADENECTOMY (N/A)  BLOCK, NERVE, INTERCOSTAL, 2 OR MORE   1 Day Post-Op     Interval History: Patient seen and examined. No acute events overnight. Chest tube without air leak. CXR without concern, no pneumothorax appreciated. On room air.     Medications:  Continuous Infusions:  Scheduled Meds:   acetaminophen  1,000 mg Oral Q8H    albuterol-ipratropium  3 mL Nebulization Q6H WAKE    enoxparin  40 mg Subcutaneous Daily    famotidine  20 mg Oral BID    gabapentin  300 mg Oral QHS    methocarbamoL  500 mg Oral QID    metoprolol tartrate  12.5 mg Oral BID    polyethylene glycol  17 g Oral Daily    senna-docusate 8.6-50 mg  1 tablet Oral Daily     PRN Meds:  Current Facility-Administered Medications:     bisacodyL, 10 mg, Rectal, Daily PRN    metoclopramide, 5 mg, Intravenous, Q6H PRN    ondansetron, 8 mg, Oral, Q8H PRN    oxyCODONE, 5 mg, Oral, Q4H PRN    oxyCODONE, 10 mg, Oral, Q4H PRN     Review of patient's allergies indicates:   Allergen Reactions    Aleve [naproxen sodium] Hives and Shortness Of Breath    Ibuprofen Hives and Shortness Of Breath     Objective:     Vital Signs (Most Recent):  Temp: 99.4 °F (37.4 °C) (05/10/24 0715)  Pulse: 70 (05/10/24 0733)  Resp: 20 (05/10/24 0733)  BP: 131/63 (05/10/24 0715)  SpO2: (!) 92 % (05/10/24 0733) Vital Signs (24h Range):  Temp:  [97.7 °F (36.5 °C)-99.4 °F (37.4 °C)] 99.4 °F (37.4 °C)  Pulse:  [62-90] 70  Resp:  [11-20] 20  SpO2:  [92 %-100 %] 92 %  BP: (111-144)/(53-69) 131/63     Weight: 82.6 kg (182 lb 1.6 oz)  Body mass index is 30.3 kg/m².    Intake/Output - Last 3 Shifts         05/08 0700 05/09 0659 05/09 0700  05/10 0659 05/10 0700  05/11 0659    P.O.  240     IV Piggyback  800     Total Intake(mL/kg)  1040 (12.6)     Urine (mL/kg/hr)  0 (0)     Chest Tube   220     Total Output  220     Net  +820            Urine Occurrence  2 x              Physical Exam  Vitals and nursing note reviewed.   Constitutional:       General: She is not in acute distress.     Appearance: Normal appearance.   HENT:      Nose: Nose normal.      Mouth/Throat:      Mouth: Mucous membranes are moist.   Cardiovascular:      Rate and Rhythm: Normal rate and regular rhythm.   Pulmonary:      Effort: Pulmonary effort is normal. No respiratory distress.      Comments: Chest tube in place with minimal serosanguinous output; no air leak appreciated; tidaling appropriately  Abdominal:      General: Abdomen is flat. There is no distension.      Palpations: Abdomen is soft.      Tenderness: There is no abdominal tenderness.   Musculoskeletal:         General: Normal range of motion.   Skin:     General: Skin is warm.   Neurological:      Mental Status: She is alert.          Significant Labs:  I have reviewed all pertinent lab results within the past 24 hours.  CBC:   Recent Labs   Lab 05/09/24  1210   WBC 11.77   RBC 4.16   HGB 13.2   HCT 38.5      MCV 93   MCH 31.7*   MCHC 34.3     CMP:   Recent Labs   Lab 05/09/24  1210   CREATININE 0.7       Significant Diagnostics:  I have reviewed all pertinent imaging results/findings within the past 24 hours.  Assessment/Plan:     NSCLC of left lung  Cara Sharp is a 68yoF with left lower lobectomy on 5/9/24    - patient seen and examined   - labs and imaging reviewed   - no pneumothorax, no air leak appreciated  - plan to clamp chest tube today   - repeat noon CXR for evaluation  - regular diet  - multimodal pain regimen  - out of bed, IS use        Naveen Yanez MD  General Surgery  [facility]

## 2024-05-10 NOTE — PLAN OF CARE
Problem: Adult Inpatient Plan of Care  Goal: Plan of Care Review  Outcome: Progressing     Problem: Adult Inpatient Plan of Care  Goal: Optimal Comfort and Wellbeing  Outcome: Progressing     Problem: Fall Injury Risk  Goal: Absence of Fall and Fall-Related Injury  Outcome: Progressing      Plan of care reviewed and acknowledged with patient. Had no major complaints that were not handled with PRN/scheduled medications. Bed in low, locked position. Call light within easy reach. Instructed to call for needs and assistance. Frequent checks made to maintain safety, comfort, positioning, bathroom needs, and pain.

## 2024-05-10 NOTE — SUBJECTIVE & OBJECTIVE
Interval History: Patient seen and examined. No acute events overnight. Chest tube without air leak. CXR without concern, no pneumothorax appreciated. On room air.     Medications:  Continuous Infusions:  Scheduled Meds:   acetaminophen  1,000 mg Oral Q8H    albuterol-ipratropium  3 mL Nebulization Q6H WAKE    enoxparin  40 mg Subcutaneous Daily    famotidine  20 mg Oral BID    gabapentin  300 mg Oral QHS    methocarbamoL  500 mg Oral QID    metoprolol tartrate  12.5 mg Oral BID    polyethylene glycol  17 g Oral Daily    senna-docusate 8.6-50 mg  1 tablet Oral Daily     PRN Meds:  Current Facility-Administered Medications:     bisacodyL, 10 mg, Rectal, Daily PRN    metoclopramide, 5 mg, Intravenous, Q6H PRN    ondansetron, 8 mg, Oral, Q8H PRN    oxyCODONE, 5 mg, Oral, Q4H PRN    oxyCODONE, 10 mg, Oral, Q4H PRN     Review of patient's allergies indicates:   Allergen Reactions    Aleve [naproxen sodium] Hives and Shortness Of Breath    Ibuprofen Hives and Shortness Of Breath     Objective:     Vital Signs (Most Recent):  Temp: 99.4 °F (37.4 °C) (05/10/24 0715)  Pulse: 70 (05/10/24 0733)  Resp: 20 (05/10/24 0733)  BP: 131/63 (05/10/24 0715)  SpO2: (!) 92 % (05/10/24 0733) Vital Signs (24h Range):  Temp:  [97.7 °F (36.5 °C)-99.4 °F (37.4 °C)] 99.4 °F (37.4 °C)  Pulse:  [62-90] 70  Resp:  [11-20] 20  SpO2:  [92 %-100 %] 92 %  BP: (111-144)/(53-69) 131/63     Weight: 82.6 kg (182 lb 1.6 oz)  Body mass index is 30.3 kg/m².    Intake/Output - Last 3 Shifts         05/08 0700  05/09 0659 05/09 0700  05/10 0659 05/10 0700  05/11 0659    P.O.  240     IV Piggyback  800     Total Intake(mL/kg)  1040 (12.6)     Urine (mL/kg/hr)  0 (0)     Chest Tube  220     Total Output  220     Net  +820            Urine Occurrence  2 x              Physical Exam  Vitals and nursing note reviewed.   Constitutional:       General: She is not in acute distress.     Appearance: Normal appearance.   HENT:      Nose: Nose normal.      Mouth/Throat:       Mouth: Mucous membranes are moist.   Cardiovascular:      Rate and Rhythm: Normal rate and regular rhythm.   Pulmonary:      Effort: Pulmonary effort is normal. No respiratory distress.      Comments: Chest tube in place with minimal serosanguinous output; no air leak appreciated; tidaling appropriately  Abdominal:      General: Abdomen is flat. There is no distension.      Palpations: Abdomen is soft.      Tenderness: There is no abdominal tenderness.   Musculoskeletal:         General: Normal range of motion.   Skin:     General: Skin is warm.   Neurological:      Mental Status: She is alert.          Significant Labs:  I have reviewed all pertinent lab results within the past 24 hours.  CBC:   Recent Labs   Lab 05/09/24  1210   WBC 11.77   RBC 4.16   HGB 13.2   HCT 38.5      MCV 93   MCH 31.7*   MCHC 34.3     CMP:   Recent Labs   Lab 05/09/24  1210   CREATININE 0.7       Significant Diagnostics:  I have reviewed all pertinent imaging results/findings within the past 24 hours.

## 2024-05-10 NOTE — PLAN OF CARE
[facility]  Initial Discharge Assessment       Primary Care Provider: Ashlyn Rivers MD    Admission Diagnosis: NSCLC of left lung [C34.92]    Admission Date: 5/9/2024  Expected Discharge Date: 5/15/2024    Transition of Care Barriers: None    Payor: HU[employer] MANAGED MEDICARE / Plan: HU[employer] MEDICARE HMO / Product Type: Capitation /     Extended Emergency Contact Information  Primary Emergency Contact: Ashish Sharp  Address: [address] [place] [address] [id number] [place] [address]  Mobile Phone: [phone]  Relation: Spouse  Secondary Emergency Contact: Dionna Orta  Mobile Phone: [phone]  Relation: Sister  Preferred language: English   needed? No    Discharge Plan A: Home with family  Discharge Plan B: Home Health      [facility] - [place], [place] - [address]  [address]  [place] [address]  Phone: [phone] Fax: [phone]    [facility] - [facility] [address] [facility]  [address] [facility] [address]  Phone: [phone] Fax: [phone]      Initial Assessment (most recent)       Adult Discharge Assessment - 05/10/24 1345          Discharge Assessment    Assessment Type Discharge Planning Assessment     Confirmed/corrected address, phone number and insurance Yes     Confirmed Demographics Correct on Facesheet     Source of Information patient     When was your last doctors appointment? 11/27/23     Communicated QI with patient/caregiver Yes     People in Home spouse     Do you expect to return to your current living situation? Yes     Do you have help at home or someone to help you manage your care at home? Yes     Who are your caregiver(s) and their phone number(s)?      Prior to hospitilization cognitive status: Alert/Oriented     Current cognitive status: Alert/Oriented     Home Layout Able to live on 1st floor     Do you take prescription medications? Yes     Do you have prescription  coverage? Yes     Do you have any problems affording any of your prescribed medications? No     Is the patient taking medications as prescribed? yes     Who is going to help you get home at discharge?      How do you get to doctors appointments? family or friend will provide     Are you on dialysis? No     Do you take coumadin? No     Discharge Plan A Home with family     Discharge Plan B Home Health     Discharge Plan discussed with: Patient     Transition of Care Barriers None     SDOH --   no       Physical Activity    On average, how many days per week do you engage in moderate to strenuous exercise (like a brisk walk)? 0 days     On average, how many minutes do you engage in exercise at this level? 0 min        Financial Resource Strain    How hard is it for you to pay for the very basics like food, housing, medical care, and heating? Not hard at all        Housing Stability    In the last 12 months, was there a time when you were not able to pay the mortgage or rent on time? No     At any time in the past 12 months, were you homeless or living in a shelter (including now)? No        Transportation Needs    Has the lack of transportation kept you from medical appointments, meetings, work or from getting things needed for daily living? No        Food Insecurity    Within the past 12 months, you worried that your food would run out before you got the money to buy more. Never true     Within the past 12 months, the food you bought just didn't last and you didn't have money to get more. Never true        Stress    Do you feel stress - tense, restless, nervous, or anxious, or unable to sleep at night because your mind is troubled all the time - these days? Not at all        Social Isolation    How often do you feel lonely or isolated from those around you?  Never        Alcohol Use    Q1: How often do you have a drink containing alcohol? Never     Q2: How many drinks containing alcohol do you have on a  typical day when you are drinking? Patient does not drink     Q3: How often do you have six or more drinks on one occasion? Never        Utilities    In the past 12 months has the electric, gas, oil, or water company threatened to shut off services in your home? No        Health Literacy    How often do you need to have someone help you when you read instructions, pamphlets, or other written material from your doctor or pharmacy? Never                   The CM met with the patient at bedside to complete the DPA. The CM placed name and contact information on the blackboard in the patient's room. Use preferred pharmacy / bedside delivery for any necessary  medications at the time of discharge.The patient is independent with all ADLs. The patient is not on Dialysis or Coumadin. The patient's  will provide assistance to the patient upon discharge. The patient's  will provide transportation upon discharge .  The CM will continue to follow for course of hospitalization.

## 2024-05-10 NOTE — ASSESSMENT & PLAN NOTE
Cara Sharp is a 68yoF with left lower lobectomy on 5/9/24    - patient seen and examined   - labs and imaging reviewed   - no pneumothorax, no air leak appreciated  - plan to clamp chest tube today   - repeat noon CXR for evaluation  - regular diet  - multimodal pain regimen  - out of bed, IS use

## 2024-05-11 VITALS
DIASTOLIC BLOOD PRESSURE: 59 MMHG | RESPIRATION RATE: 18 BRPM | OXYGEN SATURATION: 94 % | BODY MASS INDEX: 30.34 KG/M2 | SYSTOLIC BLOOD PRESSURE: 119 MMHG | TEMPERATURE: 100 F | HEART RATE: 79 BPM | WEIGHT: 182.13 LBS | HEIGHT: 65 IN

## 2024-05-11 PROCEDURE — 1111F DSCHRG MED/CURRENT MED MERGE: CPT | Mod: CPTII,,, | Performed by: STUDENT IN AN ORGANIZED HEALTH CARE EDUCATION/TRAINING PROGRAM

## 2024-05-11 PROCEDURE — 99900035 HC TECH TIME PER 15 MIN (STAT)

## 2024-05-11 PROCEDURE — 99024 POSTOP FOLLOW-UP VISIT: CPT | Mod: ,,, | Performed by: STUDENT IN AN ORGANIZED HEALTH CARE EDUCATION/TRAINING PROGRAM

## 2024-05-11 PROCEDURE — 25000003 PHARM REV CODE 250

## 2024-05-11 PROCEDURE — 94640 AIRWAY INHALATION TREATMENT: CPT

## 2024-05-11 PROCEDURE — 94799 UNLISTED PULMONARY SVC/PX: CPT

## 2024-05-11 PROCEDURE — 25000242 PHARM REV CODE 250 ALT 637 W/ HCPCS

## 2024-05-11 PROCEDURE — 94761 N-INVAS EAR/PLS OXIMETRY MLT: CPT

## 2024-05-11 RX ORDER — AMOXICILLIN 250 MG
1 CAPSULE ORAL DAILY
Qty: 20 TABLET | Refills: 0 | Status: SHIPPED | OUTPATIENT
Start: 2024-05-12

## 2024-05-11 RX ORDER — GABAPENTIN 300 MG/1
300 CAPSULE ORAL NIGHTLY
Qty: 30 CAPSULE | Refills: 11 | Status: SHIPPED | OUTPATIENT
Start: 2024-05-11 | End: 2025-05-11

## 2024-05-11 RX ORDER — OXYCODONE HYDROCHLORIDE 5 MG/1
5 TABLET ORAL EVERY 4 HOURS PRN
Qty: 20 TABLET | Refills: 0 | Status: SHIPPED | OUTPATIENT
Start: 2024-05-11

## 2024-05-11 RX ORDER — METHOCARBAMOL 500 MG/1
500 TABLET, FILM COATED ORAL 4 TIMES DAILY
Qty: 40 TABLET | Refills: 0 | Status: SHIPPED | OUTPATIENT
Start: 2024-05-11 | End: 2024-05-21

## 2024-05-11 RX ORDER — ACETAMINOPHEN 500 MG
1000 TABLET ORAL EVERY 8 HOURS
Qty: 60 TABLET | Refills: 0 | Status: SHIPPED | OUTPATIENT
Start: 2024-05-11

## 2024-05-11 RX ADMIN — METHOCARBAMOL 500 MG: 500 TABLET ORAL at 04:05

## 2024-05-11 RX ADMIN — ACETAMINOPHEN 1000 MG: 500 TABLET ORAL at 06:05

## 2024-05-11 RX ADMIN — IPRATROPIUM BROMIDE AND ALBUTEROL SULFATE 3 ML: 2.5; .5 SOLUTION RESPIRATORY (INHALATION) at 08:05

## 2024-05-11 RX ADMIN — METHOCARBAMOL 500 MG: 500 TABLET ORAL at 02:05

## 2024-05-11 RX ADMIN — METHOCARBAMOL 500 MG: 500 TABLET ORAL at 08:05

## 2024-05-11 RX ADMIN — FAMOTIDINE 20 MG: 20 TABLET ORAL at 08:05

## 2024-05-11 RX ADMIN — METOPROLOL TARTRATE 12.5 MG: 25 TABLET, FILM COATED ORAL at 08:05

## 2024-05-11 RX ADMIN — IPRATROPIUM BROMIDE AND ALBUTEROL SULFATE 3 ML: 2.5; .5 SOLUTION RESPIRATORY (INHALATION) at 02:05

## 2024-05-11 RX ADMIN — SENNOSIDES AND DOCUSATE SODIUM 1 TABLET: 50; 8.6 TABLET ORAL at 08:05

## 2024-05-11 RX ADMIN — POLYETHYLENE GLYCOL 3350 17 G: 17 POWDER, FOR SOLUTION ORAL at 08:05

## 2024-05-11 RX ADMIN — OXYCODONE HYDROCHLORIDE 10 MG: 10 TABLET ORAL at 08:05

## 2024-05-11 RX ADMIN — OXYCODONE HYDROCHLORIDE 10 MG: 10 TABLET ORAL at 02:05

## 2024-05-11 RX ADMIN — ACETAMINOPHEN 1000 MG: 500 TABLET ORAL at 02:05

## 2024-05-11 NOTE — SUBJECTIVE & OBJECTIVE
Interval History:  No significant changes.  Doing well on room air.  Pain is better controlled today.  Minimal cough.  Chest x-ray following clamping of chest tube yesterday showed a small apical pneumothorax.  She was placed back to water seal for the day.  Chest x-ray from this morning is pending.    Medications:  Continuous Infusions:  Scheduled Meds:   acetaminophen  1,000 mg Oral Q8H    albuterol-ipratropium  3 mL Nebulization Q6H WAKE    enoxparin  40 mg Subcutaneous Daily    famotidine  20 mg Oral BID    gabapentin  300 mg Oral QHS    methocarbamoL  500 mg Oral QID    metoprolol tartrate  12.5 mg Oral BID    polyethylene glycol  17 g Oral Daily    senna-docusate 8.6-50 mg  1 tablet Oral Daily     PRN Meds:  Current Facility-Administered Medications:     bisacodyL, 10 mg, Rectal, Daily PRN    metoclopramide, 5 mg, Intravenous, Q6H PRN    ondansetron, 8 mg, Oral, Q8H PRN    oxyCODONE, 5 mg, Oral, Q4H PRN    oxyCODONE, 10 mg, Oral, Q4H PRN     Review of patient's allergies indicates:   Allergen Reactions    Aleve [naproxen sodium] Hives and Shortness Of Breath    Ibuprofen Hives and Shortness Of Breath     Objective:     Vital Signs (Most Recent):  Temp: 99.5 °F (37.5 °C) (05/11/24 0725)  Pulse: 69 (05/11/24 0826)  Resp: 16 (05/11/24 0832)  BP: 136/78 (05/11/24 0725)  SpO2: 96 % (05/11/24 0826) Vital Signs (24h Range):  Temp:  [98 °F (36.7 °C)-99.5 °F (37.5 °C)] 99.5 °F (37.5 °C)  Pulse:  [62-95] 69  Resp:  [16-20] 16  SpO2:  [90 %-96 %] 96 %  BP: (104-145)/(51-78) 136/78     Weight: 82.6 kg (182 lb 1.6 oz)  Body mass index is 30.3 kg/m².    Intake/Output - Last 3 Shifts         05/09 0700  05/10 0659 05/10 0700  05/11 0659 05/11 0700  05/12 0659    P.O. 240      IV Piggyback 800      Total Intake(mL/kg) 1040 (12.6)      Urine (mL/kg/hr) 0 (0) 0 (0)     Chest Tube 220 300     Total Output 220 300     Net +820 -300            Urine Occurrence 2 x 1 x              Physical Exam  Vitals and nursing note reviewed.    Constitutional:       General: She is not in acute distress.     Appearance: Normal appearance.   HENT:      Nose: Nose normal.      Mouth/Throat:      Mouth: Mucous membranes are moist.   Cardiovascular:      Rate and Rhythm: Normal rate and regular rhythm.   Pulmonary:      Effort: Pulmonary effort is normal. No respiratory distress.      Comments: Chest tube in place with minimal serosanguinous output; no air leak appreciated; tidaling appropriately  Abdominal:      General: Abdomen is flat. There is no distension.      Palpations: Abdomen is soft.      Tenderness: There is no abdominal tenderness.   Musculoskeletal:         General: Normal range of motion.   Skin:     General: Skin is warm.   Neurological:      Mental Status: She is alert.          Significant Labs:  I have reviewed all pertinent lab results within the past 24 hours.  CBC:   Recent Labs   Lab 05/09/24  1210   WBC 11.77   RBC 4.16   HGB 13.2   HCT 38.5      MCV 93   MCH 31.7*   MCHC 34.3     CMP:   Recent Labs   Lab 05/09/24  1210   CREATININE 0.7       Significant Diagnostics:  I have reviewed all pertinent imaging results/findings within the past 24 hours.

## 2024-05-11 NOTE — NURSING
[facility] Plan of Care Note  Dx LL Lobectomy/ Lymphadenctomy     Shift Events  : CXR     Goals of Care: remove CT as tolerated      Neuro:  A&Ox4    Vital Signs: VSS     Respiratory: RA 92-96%     Diet: Reg  Is patient tolerating current diet? kamilla     GTTS:      Urine Output/Bowel Movement: LBM 5/8, passing gas,   Miralax and senna given      Drains/Tubes/Tube Feeds (include total output/shift):    L chest tube   Over night  shift 120  Day shift     Lines: PIV x2     Accuchecks:N/A     Skin: intact, Left chest tube     Fall Risk Score: 13     Activity level? Min  assist x1 to stand by     Any scheduled procedures? no     Any safety concerns? fall     Other: chest tube removed per MD, pt tolerated

## 2024-05-11 NOTE — ASSESSMENT & PLAN NOTE
Cara Sharp is a 68yoF with left lower lobectomy on 5/9/24    - patient seen and examined   - labs and imaging reviewed   - chest x-ray from this morning is pending  - plan to clamp chest tube today if chest x-ray shows resolution of pneumothorax.  We will likely obtain a post clamp repeat chest x-ray for assessment of possibility of pulling the tube  - regular diet  - multimodal pain regimen  - out of bed, IS use

## 2024-05-11 NOTE — PLAN OF CARE
05/11/24 1515   Post-Acute Status   Post-Acute Authorization Other   Other Status No Post-Acute Service Needs   Hospital Resources/Appts/Education Provided Provided patient/caregiver with written discharge plan information   Discharge Delays None known at this time   Discharge Plan   Discharge Plan A Home with family     Patient cleared for discharge from case management standpoint.      Chart and discharge orders reviewed.  Patient discharged home with no further case management needs.        Discharge Plan A and Plan B have been determined by review of patient's clinical status, future medical and therapeutic needs, and coverage/benefits for post-acute care in coordination with multidisciplinary team members.         Shannon Acosta, CHARLIE  PRN - [facility]

## 2024-05-11 NOTE — PROGRESS NOTES
Sae Hankins - [facility]  General Surgery  Progress Note    Subjective:     History of Present Illness:  No notes on file    Post-Op Info:  Procedure(s) (LRB):  XI ROBOTIC RATS LEFT LOWER LOBECTOMY,LUNG (Left)  LYMPHADENECTOMY (N/A)  BLOCK, NERVE, INTERCOSTAL, 2 OR MORE   2 Days Post-Op     Interval History:  No significant changes.  Doing well on room air.  Pain is better controlled today.  Minimal cough.  Chest x-ray following clamping of chest tube yesterday showed a small apical pneumothorax.  She was placed back to water seal for the day.  Chest x-ray from this morning is pending.    Medications:  Continuous Infusions:  Scheduled Meds:   acetaminophen  1,000 mg Oral Q8H    albuterol-ipratropium  3 mL Nebulization Q6H WAKE    enoxparin  40 mg Subcutaneous Daily    famotidine  20 mg Oral BID    gabapentin  300 mg Oral QHS    methocarbamoL  500 mg Oral QID    metoprolol tartrate  12.5 mg Oral BID    polyethylene glycol  17 g Oral Daily    senna-docusate 8.6-50 mg  1 tablet Oral Daily     PRN Meds:  Current Facility-Administered Medications:     bisacodyL, 10 mg, Rectal, Daily PRN    metoclopramide, 5 mg, Intravenous, Q6H PRN    ondansetron, 8 mg, Oral, Q8H PRN    oxyCODONE, 5 mg, Oral, Q4H PRN    oxyCODONE, 10 mg, Oral, Q4H PRN     Review of patient's allergies indicates:   Allergen Reactions    Aleve [naproxen sodium] Hives and Shortness Of Breath    Ibuprofen Hives and Shortness Of Breath     Objective:     Vital Signs (Most Recent):  Temp: 99.5 °F (37.5 °C) (05/11/24 0725)  Pulse: 69 (05/11/24 0826)  Resp: 16 (05/11/24 0832)  BP: 136/78 (05/11/24 0725)  SpO2: 96 % (05/11/24 0826) Vital Signs (24h Range):  Temp:  [98 °F (36.7 °C)-99.5 °F (37.5 °C)] 99.5 °F (37.5 °C)  Pulse:  [62-95] 69  Resp:  [16-20] 16  SpO2:  [90 %-96 %] 96 %  BP: (104-145)/(51-78) 136/78     Weight: 82.6 kg (182 lb 1.6 oz)  Body mass index is 30.3 kg/m².    Intake/Output - Last 3 Shifts         05/09 0700  05/10 0659 05/10 0700  05/11 0659 05/11  0700  05/12 0659    P.O. 240      IV Piggyback 800      Total Intake(mL/kg) 1040 (12.6)      Urine (mL/kg/hr) 0 (0) 0 (0)     Chest Tube 220 300     Total Output 220 300     Net +820 -300            Urine Occurrence 2 x 1 x              Physical Exam  Vitals and nursing note reviewed.   Constitutional:       General: She is not in acute distress.     Appearance: Normal appearance.   HENT:      Nose: Nose normal.      Mouth/Throat:      Mouth: Mucous membranes are moist.   Cardiovascular:      Rate and Rhythm: Normal rate and regular rhythm.   Pulmonary:      Effort: Pulmonary effort is normal. No respiratory distress.      Comments: Chest tube in place with minimal serosanguinous output; no air leak appreciated; tidaling appropriately  Abdominal:      General: Abdomen is flat. There is no distension.      Palpations: Abdomen is soft.      Tenderness: There is no abdominal tenderness.   Musculoskeletal:         General: Normal range of motion.   Skin:     General: Skin is warm.   Neurological:      Mental Status: She is alert.          Significant Labs:  I have reviewed all pertinent lab results within the past 24 hours.  CBC:   Recent Labs   Lab 05/09/24  1210   WBC 11.77   RBC 4.16   HGB 13.2   HCT 38.5      MCV 93   MCH 31.7*   MCHC 34.3     CMP:   Recent Labs   Lab 05/09/24  1210   CREATININE 0.7       Significant Diagnostics:  I have reviewed all pertinent imaging results/findings within the past 24 hours.  Assessment/Plan:     * NSCLC of left lung  Cara Sharp is a 68yoF with left lower lobectomy on 5/9/24    - patient seen and examined   - labs and imaging reviewed   - chest x-ray from this morning is pending  - plan to clamp chest tube today if chest x-ray shows resolution of pneumothorax.  We will likely obtain a post clamp repeat chest x-ray for assessment of possibility of pulling the tube  - regular diet  - multimodal pain regimen  - out of bed, IS use        Brandon Collier MD  General  Surgery  [facility]

## 2024-05-11 NOTE — PLAN OF CARE
Problem: Adult Inpatient Plan of Care  Goal: Plan of Care Review  Outcome: Progressing  Goal: Absence of Hospital-Acquired Illness or Injury  Outcome: Progressing     Problem: Wound  Goal: Optimal Pain Control and Function  Outcome: Progressing  Goal: Skin Health and Integrity  Outcome: Progressing

## 2024-05-11 NOTE — DISCHARGE INSTRUCTIONS
Post-op instructions:    - Remove dressings in 48 hours from discharge  - OK for regular diet  - No driving while taking narcotic medication  - OK to shower with warm soap and water. No soaking in any water  - Clinic visit to be scheduled in two weeks

## 2024-05-11 NOTE — NURSING
Discharge instructions given to patient and family. Both verbalized understanding. All belongings gathered and sent with patient. Patient free from falls and injury. Patient leaving via transport wheelchair to family vehicle. All questions and concerns answered at this time    Case Management and MD team all clear for patient to leave at this time.    IV x2 removed per order, pt tolerated     Telemetry removed per order and returned to nursing station    home medications from pharmacy delivered      Family at bedside, discharged at 1600

## 2024-05-11 NOTE — PLAN OF CARE
[facility]  Discharge Final Note    Primary Care Provider: Ashlyn Rivers MD    Expected Discharge Date: 5/11/2024    Final Discharge Note (most recent)       Final Note - 05/11/24 1517          Final Note    Assessment Type Final Discharge Note     Anticipated Discharge Disposition Home or Self Care     What phone number can be called within the next 1-3 days to see how you are doing after discharge? [phone]     Hospital Resources/Appts/Education Provided Provided patient/caregiver with written discharge plan information        Post-Acute Status    Post-Acute Authorization Other     Other Status No Post-Acute Service Needs     Discharge Delays None known at this time                     Important Message from Medicare             Contact Info       Aron Cintron MD   Specialty: Thoracic Diseases    [address]  [facility] [address]   Phone: [phone]       Next Steps: Follow up in 2 week(s)          Patient medically ready for discharge to home.  Family/patient aware of discharge.    Future Appointments   Date Time Provider Department Center   5/14/2024 10:15 AM [facility] OIC-US1 MASTER [facility] ULTR [facility]   6/27/2024  9:40 AM Jose Bean MD [facility] TNSYALFREDO Acosta LMSW  PRN - [facility]  [phone]

## 2024-05-11 NOTE — DISCHARGE SUMMARY
[facility]  General Surgery  Discharge Summary      Patient Name: Cara Sharp  MRN: [id number]  Admission Date: 5/9/2024  Hospital Length of Stay: 2 days  Discharge Date and Time:  05/11/2024 3:06 PM  Attending Physician: Aron Cintron MD   Discharging Provider: Brandon Collier MD  Primary Care Provider: sAhlyn Rivers MD     HPI: Patient is a 68 y.o. female never smoker with left breast DCIS s/p bilateral mastectomy (12/03/21) on letrozole, HLD, hypothyroidism here today for evaluation of left lower lobe adenocarcinoma. CT abdomen incidentally noted LLL pulmonary nodule during work-up for breast cancer. Followed with serial CTs which have shown interval growth. Percutaneous biopsy positive for adenocarcinoma. Today patient reports she is doing well. Anxious about visit; however, she is adamant about wanting surgery. Asymptomatic. Denies CP, SOB, cough, unintentional weight loss, syncope, dizziness, fatigue.      PSH: Carpal tunnel release, partial mastectomy with SLN, bilateral mastectomy, partial thyroidectomy  Meds: atorvastatin, letrozole, levothyroxine, paroxetine    Procedure(s) (LRB):  XI ROBOTIC RATS LEFT LOWER LOBECTOMY,LUNG (Left)  LYMPHADENECTOMY (N/A)  BLOCK, NERVE, INTERCOSTAL, 2 OR MORE     Hospital Course: Cara Sharp [place] [facility] [place] of 5/9/2024 for the procedure listed above. The procedure was performed without complication and she was transferred to the floor for further post op care and monitoring. Their postoperative course was uneventful and she progressed according to plan.  Her pain was controlled with a combination of IV and PO narcotic pain medications. Her diet has been advanced throughout her hospital stay.  She was monitored with daily chest x-rays and management of a left-sided chest tube which was removed prior to discharge after satisfactory fluoroscopic films. She is now ambulating without assistance, tolerating a regular diet, pain is well  controlled with PO pain medication, and she is voiding appropriately. She now meets all criteria for discharge.       Significant Diagnostic Studies: N/A    Pending Diagnostic Studies:       Procedure Component Value Units Date/Time    Specimen to Pathology, Surgery Pulmonary and Thoracic [[id number]] Collected: 05/09/24 1103    Order Status: Sent Lab Status: In process Updated: 05/09/24 1612    Specimen: Tissue     X-Ray Chest AP Portable [[id number]]     Order Status: Sent Lab Status: No result           Final Active Diagnoses:    Diagnosis Date Noted POA    PRINCIPAL PROBLEM:  NSCLC of left lung [C34.92] 04/15/2024 Yes      Problems Resolved During this Admission:      Discharged Condition: good    Disposition: Home or Self Care    Follow Up:   Follow-up Information       Aron Cintron MD Follow up in 2 week(s).    Specialty: Thoracic Diseases  Contact information:  [address]  [facility] [address]  [phone]                           Patient Instructions:      Diet Adult Regular     No driving until:   Order Comments: No Driving while taking narcotic pain medication     Notify your health care provider if you experience any of the following:  temperature >100.4     Notify your health care provider if you experience any of the following:  persistent nausea and vomiting or diarrhea     Notify your health care provider if you experience any of the following:  severe uncontrolled pain     Notify your health care provider if you experience any of the following:  redness, tenderness, or signs of infection (pain, swelling, redness, odor or green/yellow discharge around incision site)     Remove dressing in 48 hours     Activity as tolerated     Medications:  Reconciled Home Medications:      Medication List        START taking these medications      acetaminophen 500 MG tablet  Commonly known as: TYLENOL  Take 2 tablets (1,000 mg total) by mouth every 8 (eight) hours.     gabapentin 300 MG  capsule  Commonly known as: NEURONTIN  Take 1 capsule (300 mg total) by mouth every evening.     methocarbamoL 500 MG Tab  Commonly known as: ROBAXIN  Take 1 tablet (500 mg total) by mouth 4 (four) times daily. for 10 days     oxyCODONE 5 MG immediate release tablet  Commonly known as: ROXICODONE  Take 1 tablet (5 mg total) by mouth every 4 (four) hours as needed for Pain.     senna-docusate 8.6-50 mg 8.6-50 mg per tablet  Commonly known as: PERICOLACE  Take 1 tablet by mouth once daily.  Start taking on: May 12, 2024            CONTINUE taking these medications      atorvastatin 40 MG tablet  Commonly known as: LIPITOR  Take 1 tablet by mouth every evening.     ciclopirox 8 % Soln  Commonly known as: PENLAC  Apply topically nightly. Remove once a day with nail polish remover     ergocalciferol 50,000 unit Cap  Commonly known as: ERGOCALCIFEROL  Take by mouth every 7 days. WEDNESDAYS     letrozole 2.5 mg Tab  Commonly known as: FEMARA  TAKE 1 TABLET EVERY DAY     levothyroxine 88 MCG tablet  Commonly known as: SYNTHROID  Take by mouth before breakfast.     paroxetine 30 MG tablet  Commonly known as: PAXIL  Take 30 mg by mouth every morning.              Brandon Collier MD  General Surgery  [facility]

## 2024-05-13 DIAGNOSIS — C34.92 NSCLC OF LEFT LUNG: Primary | ICD-10-CM

## 2024-05-14 ENCOUNTER — HOSPITAL ENCOUNTER (OUTPATIENT)
Dept: RADIOLOGY | Facility: HOSPITAL | Age: 69
Discharge: HOME | End: 2024-05-14
Attending: SPECIALIST
Payer: MEDICARE

## 2024-05-14 DIAGNOSIS — E04.2 NONTOXIC MULTINODULAR GOITER: ICD-10-CM

## 2024-05-14 PROCEDURE — 76536 US EXAM OF HEAD AND NECK: CPT | Mod: TC

## 2024-05-14 PROCEDURE — 76536 US EXAM OF HEAD AND NECK: CPT | Mod: 26,,, | Performed by: INTERNAL MEDICINE

## 2024-05-20 NOTE — PROGRESS NOTES
"Subjective     Patient ID: Cara Sharp is a 68 y.o. female.    Chief Complaint: No chief complaint on file.    Diagnosis:  NSCLC of LLL     Pre-operative therapy: none    Procedure(s) and date(s): 05/09/24 - robotic left lower lobectomy with MLND    Pathology: 1.5 cm. Invasive, moderately differentiated adenocarcinoma. Margins negative. positive VPI. negative PARAMJIT. negative Lymphovascular invasion. LN stations 5,7,10,11 negative. yC2xP4Gp, Stage IB.      Post-operative therapy: Discuss utility of adjuvant systemic therapy in Thoracic TB    HPI  68 y.o. female who presents to clinic for 2 week follow up s/p robotic left lower lobectomy with MLND. Tolerated procedure well. Uncomplicated post-operative course. Discharged home on POD2 in good condition. Today patient reports she is doing well. Reports "stiffness" and neuropathic pain along incision sites; however, pain controlled with current regimen: gabapentin 300 mg QHS, muscle relaxer (out) and tylenol. Denies fever, SOB, cough, fatigue, diaphoresis, night sweats.     Review of Systems   Constitutional:  Negative for chills, diaphoresis, fatigue, fever and unexpected weight change.   Respiratory: Negative.  Negative for cough, shortness of breath, wheezing and stridor.    Cardiovascular: Negative.  Negative for chest pain, palpitations, leg swelling and claudication.   Neurological:  Negative for dizziness, syncope, weakness and light-headedness.   Psychiatric/Behavioral: Negative.          Objective     Physical Exam  Constitutional:       Appearance: Normal appearance.   Eyes:      Extraocular Movements: Extraocular movements intact.   Cardiovascular:      Rate and Rhythm: Normal rate and regular rhythm.      Pulses: Normal pulses.   Pulmonary:      Effort: Pulmonary effort is normal.      Comments: Decreased BS at left base  Abdominal:      General: Abdomen is flat.      Palpations: Abdomen is soft.   Skin:     General: Skin is warm and dry.      Comments: " Incisions healing well. C/D/I   Neurological:      General: No focal deficit present.      Mental Status: She is alert and oriented to person, place, and time. Mental status is at baseline.   Psychiatric:         Mood and Affect: Mood normal.         Behavior: Behavior normal.         Thought Content: Thought content normal.         Diagnostics:     CXR 05/24/24: pleural effusion present on left. Asymptomatic, denies SOB and reports gradual return to ADLs.        Assessment and Plan   68 y.o. female who presents to clinic for 2 week follow up s/p robotic left lower lobectomy with MLND.  Moderate left basilar effusion    Plan:     Discuss utility of adjuvant systemic therapy for Stage IB NSCLC (positive VPI) in Thoracic TB 05/29/24  Prescribe 2nd course of methocarbomal 500 mg Q6hrs PRN for muscle spasms  IR consult for image-guided left thoracentesis

## 2024-05-23 LAB
COMMENT: NORMAL
FINAL PATHOLOGIC DIAGNOSIS: NORMAL
GROSS: NORMAL
Lab: NORMAL
MICROSCOPIC EXAM: NORMAL

## 2024-05-24 ENCOUNTER — HOSPITAL ENCOUNTER (OUTPATIENT)
Dept: RADIOLOGY | Facility: HOSPITAL | Age: 69
Discharge: HOME | End: 2024-05-24
Payer: MEDICARE

## 2024-05-24 ENCOUNTER — OFFICE VISIT (OUTPATIENT)
Dept: CARDIOTHORACIC SURGERY | Facility: CLINIC | Age: 69
End: 2024-05-24
Payer: MEDICARE

## 2024-05-24 ENCOUNTER — TELEPHONE (OUTPATIENT)
Dept: PULMONOLOGY | Facility: CLINIC | Age: 69
End: 2024-05-24
Payer: MEDICARE

## 2024-05-24 VITALS
SYSTOLIC BLOOD PRESSURE: 167 MMHG | DIASTOLIC BLOOD PRESSURE: 79 MMHG | BODY MASS INDEX: 29.42 KG/M2 | HEART RATE: 81 BPM | WEIGHT: 176.56 LBS | OXYGEN SATURATION: 96 % | HEIGHT: 65 IN

## 2024-05-24 DIAGNOSIS — C34.92 NSCLC OF LEFT LUNG: ICD-10-CM

## 2024-05-24 DIAGNOSIS — C34.32 MALIGNANT NEOPLASM OF LOWER LOBE OF LEFT LUNG: Primary | ICD-10-CM

## 2024-05-24 DIAGNOSIS — C34.92 NSCLC OF LEFT LUNG: Primary | ICD-10-CM

## 2024-05-24 PROCEDURE — 1159F MED LIST DOCD IN RCRD: CPT | Mod: CPTII,S$GLB,, | Performed by: THORACIC SURGERY (CARDIOTHORACIC VASCULAR SURGERY)

## 2024-05-24 PROCEDURE — 99024 POSTOP FOLLOW-UP VISIT: CPT | Mod: S$GLB,,, | Performed by: THORACIC SURGERY (CARDIOTHORACIC VASCULAR SURGERY)

## 2024-05-24 PROCEDURE — 71046 X-RAY EXAM CHEST 2 VIEWS: CPT | Mod: TC

## 2024-05-24 PROCEDURE — 71046 X-RAY EXAM CHEST 2 VIEWS: CPT | Mod: 26,,, | Performed by: RADIOLOGY

## 2024-05-24 PROCEDURE — 3077F SYST BP >= 140 MM HG: CPT | Mod: CPTII,S$GLB,, | Performed by: THORACIC SURGERY (CARDIOTHORACIC VASCULAR SURGERY)

## 2024-05-24 PROCEDURE — 99999 PR PBB SHADOW E&M-EST. PATIENT-LVL III: CPT | Mod: PBBFAC,,, | Performed by: THORACIC SURGERY (CARDIOTHORACIC VASCULAR SURGERY)

## 2024-05-24 PROCEDURE — 3288F FALL RISK ASSESSMENT DOCD: CPT | Mod: CPTII,S$GLB,, | Performed by: THORACIC SURGERY (CARDIOTHORACIC VASCULAR SURGERY)

## 2024-05-24 PROCEDURE — 3078F DIAST BP <80 MM HG: CPT | Mod: CPTII,S$GLB,, | Performed by: THORACIC SURGERY (CARDIOTHORACIC VASCULAR SURGERY)

## 2024-05-24 PROCEDURE — 1126F AMNT PAIN NOTED NONE PRSNT: CPT | Mod: CPTII,S$GLB,, | Performed by: THORACIC SURGERY (CARDIOTHORACIC VASCULAR SURGERY)

## 2024-05-24 PROCEDURE — 1101F PT FALLS ASSESS-DOCD LE1/YR: CPT | Mod: CPTII,S$GLB,, | Performed by: THORACIC SURGERY (CARDIOTHORACIC VASCULAR SURGERY)

## 2024-05-24 RX ORDER — METHOCARBAMOL 500 MG/1
500 TABLET, FILM COATED ORAL 4 TIMES DAILY
Qty: 40 TABLET | Refills: 0 | Status: SHIPPED | OUTPATIENT
Start: 2024-05-24 | End: 2024-06-03

## 2024-05-24 NOTE — TELEPHONE ENCOUNTER
----- Message from Jonathan Baca MD sent at 5/23/2024  4:53 PM CDT -----  Please schedule fu appt in Mid June for patient

## 2024-05-31 DIAGNOSIS — C34.32 MALIGNANT NEOPLASM OF LOWER LOBE OF LEFT LUNG: ICD-10-CM

## 2024-05-31 DIAGNOSIS — C34.92 NSCLC OF LEFT LUNG: Primary | ICD-10-CM

## 2024-06-06 ENCOUNTER — HOSPITAL ENCOUNTER (OUTPATIENT)
Dept: INTERVENTIONAL RADIOLOGY/VASCULAR | Facility: HOSPITAL | Age: 69
Discharge: HOME | End: 2024-06-06
Payer: MEDICARE

## 2024-06-06 ENCOUNTER — HOSPITAL ENCOUNTER (OUTPATIENT)
Dept: RADIOLOGY | Facility: HOSPITAL | Age: 69
Discharge: HOME | End: 2024-06-06
Attending: PHYSICIAN ASSISTANT
Payer: MEDICARE

## 2024-06-06 VITALS
SYSTOLIC BLOOD PRESSURE: 157 MMHG | OXYGEN SATURATION: 97 % | HEART RATE: 79 BPM | DIASTOLIC BLOOD PRESSURE: 70 MMHG | RESPIRATION RATE: 17 BRPM

## 2024-06-06 DIAGNOSIS — C34.32 MALIGNANT NEOPLASM OF LOWER LOBE OF LEFT LUNG: ICD-10-CM

## 2024-06-06 PROCEDURE — 27200940 IR THORACENTESIS WITH IMAGING

## 2024-06-06 PROCEDURE — 32555 ASPIRATE PLEURA W/ IMAGING: CPT | Mod: LT,,, | Performed by: PHYSICIAN ASSISTANT

## 2024-06-06 PROCEDURE — 32555 ASPIRATE PLEURA W/ IMAGING: CPT | Performed by: STUDENT IN AN ORGANIZED HEALTH CARE EDUCATION/TRAINING PROGRAM

## 2024-06-06 PROCEDURE — 71045 X-RAY EXAM CHEST 1 VIEW: CPT | Mod: TC

## 2024-06-06 PROCEDURE — 71045 X-RAY EXAM CHEST 1 VIEW: CPT | Mod: 26,,, | Performed by: RADIOLOGY

## 2024-06-06 NOTE — PROCEDURES
Radiology Post-Procedure Note    Pre Op Diagnosis: L pleural effusion  Post Op Diagnosis: Same    Procedure: Ultrasound Guided L thoracentesis     Procedure performed by: Lisandra Rowan PA-C    Written Informed Consent Obtained: Yes  Specimen Removed: YES, 950 cc serous fluid  Estimated Blood Loss: Minimal    Findings:   Successful L thoracentesis. STAT CXR ordered per protocol.    Patient tolerated procedure well.    Lisandra Rowan PA-C  Interventional Radiology   Spectra: [phone]

## 2024-06-06 NOTE — H&P
Radiology History & Physical      SUBJECTIVE:     Chief Complaint: L pleural effusion, SOB    History of Present Illness:  Cara Sharp is a 68 y.o. female who presents for ultrasound guided L thoracentesis    Past Medical History:   Diagnosis Date    H/O mammogram 11/16/2017    Birads 2/B  @ [facility]     Hearing impaired     History of bone density study 05/09/2018    Spine T-score: 0.9   //   Rt Hip T-score:  0.0   @ [facility]     Hyperlipidemia     Menopause 2009    Occult blood positive stool     Pap smear for cervical cancer screening 08/31/2015    Negative/ Hpv Neg    Relies on partner vasectomy for contraception     Thyroid disease     nodules     Past Surgical History:   Procedure Laterality Date    CARPAL TUNNEL RELEASE Left 1997    COLONOSCOPY  10/07/2020    Diverticulitis ( Rtn 5 yrs) Dr Ashish Leal    DILATION AND CURETTAGE OF UTERUS  1985    LAPAROSCOPY FOR ENDOMETRIOSIS    INJECTION FOR SENTINEL NODE IDENTIFICATION Left 12/03/2021    Procedure: INJECTION, FOR SENTINEL NODE IDENTIFICATION-Left;  Surgeon: ERWIN Chi MD;  Location: [facility] OR;  Service: General;  Laterality: Left;    INJECTION OF ANESTHETIC AGENT AROUND MULTIPLE INTERCOSTAL NERVES  5/9/2024    Procedure: BLOCK, NERVE, INTERCOSTAL, 2 OR MORE;  Surgeon: Aron Cintron MD;  [facility];  Service: Thoracic;;    LYMPHADENECTOMY N/A 5/9/2024    Procedure: LYMPHADENECTOMY;  Surgeon: Aron Cintron MD;  Location: [facility] OR 2ND FLR;  Service: Thoracic;  Laterality: N/A;    MASTECTOMY, PARTIAL Left 12/03/2021    bilateral mastectomy - Procedure: MASTECTOMY, PARTIAL-Left with radiological marker;  Surgeon: ERWIN Chi MD;  Location: [facility] OR;  Service: General;  Laterality: Left;    SENTINEL LYMPH NODE BIOPSY Left 12/03/2021    Procedure: BIOPSY, LYMPH NODE, SENTINEL-Left;  Surgeon: ERWIN Chi MD;  Location: [facility] OR;  Service: General;  Laterality: Left;    THYROIDECTOMY, PARTIAL  2011    Nodules, Dr Blackwell ([facility])     TONSILLECTOMY  1977    XI ROBOTIC RATS,WITH LOBECTOMY,LUNG Left 5/9/2024    Procedure: XI ROBOTIC RATS LEFT LOWER LOBECTOMY,LUNG;  Surgeon: Aron Cintron MD;  Location: [facility] OR [address] [place] [address];  Service: Thoracic;  Laterality: Left;       Home Meds:   Prior to Admission medications    Medication Sig Start Date End Date Taking? Authorizing Provider   acetaminophen (TYLENOL) 500 MG tablet Take 2 tablets (1,000 mg total) by mouth every 8 (eight) hours. 5/11/24   Brandon Collier MD   atorvastatin (LIPITOR) 40 MG tablet Take 1 tablet by mouth every evening. 8/22/23   Provider, Historical   ciclopirox (PENLAC) 8 % Soln Apply topically nightly. Remove once a day with nail polish remover 9/27/23   Lizbeth Mera DPM   ergocalciferol (ERGOCALCIFEROL) 50,000 unit Cap Take by mouth every 7 days. WEDNESDAYS 2/14/23   Provider, Historical   gabapentin (NEURONTIN) 300 MG capsule Take 1 capsule (300 mg total) by mouth every evening. 5/11/24 5/11/25  Brandon Collier MD   letrozole (FEMARA) 2.5 mg Tab TAKE 1 TABLET EVERY DAY 4/18/24   Jose Bean MD   levothyroxine (SYNTHROID) 88 MCG tablet Take by mouth before breakfast. 8/29/16   Provider, Historical   oxyCODONE (ROXICODONE) 5 MG immediate release tablet Take 1 tablet (5 mg total) by mouth every 4 (four) hours as needed for Pain. 5/11/24   Brandon Collier MD   paroxetine (PAXIL) 30 MG tablet Take 30 mg by mouth every morning.    Provider, Historical   senna-docusate 8.6-50 mg (PERICOLACE) 8.6-50 mg per tablet Take 1 tablet by mouth once daily. 5/12/24   Brandon Collier MD     Anticoagulants/Antiplatelets: no anticoagulation    Allergies:   Review of patient's allergies indicates:   Allergen Reactions    Aleve [naproxen sodium] Hives and Shortness Of Breath    Ibuprofen Hives and Shortness Of Breath     Sedation History:  no adverse reactions    Review of Systems:   Hematological: no known coagulopathies  Respiratory: no shortness of breath  Cardiovascular: no  chest pain  Gastrointestinal: no abdominal pain  Genito-Urinary: no dysuria  Musculoskeletal: negative  Neurological: no TIA or stroke symptoms         OBJECTIVE:     Vital Signs (Most Recent)       Physical Exam:  ASA: 2  Mallampati: n/a    General: no acute distress  Mental Status: alert and oriented to person, place and time  HEENT: normocephalic, atraumatic  Chest: unlabored breathing  Heart: regular heart rate  Abdomen: distended  Extremity: moves all extremities    ASSESSMENT/PLAN:     Sedation Plan: local  Patient will undergo ultrasound guided L thoracentesis    Lisandra Rowan PA-C  Interventional Radiology   Spectra: [phone]

## 2024-06-06 NOTE — PLAN OF CARE
Pt ambulated independently out of department. VSS. Refused printed dc instructions.   Loss of friends due to pandemic

## 2024-06-06 NOTE — DISCHARGE INSTRUCTIONS
950 ml pleural fluid removed.      Please call with any questions or concerns.      Monday thru Friday 8:00 am - 4:30 pm    Interventional Radiology   [phone]    After Hours    Ask for the Radiology Intern on call  [phone]       Hypernatremia

## 2024-06-07 ENCOUNTER — PATIENT MESSAGE (OUTPATIENT)
Dept: CARDIOTHORACIC SURGERY | Facility: CLINIC | Age: 69
End: 2024-06-07
Payer: MEDICARE

## 2024-06-13 NOTE — PHYSICIAN QUERY
"Please clarify the    Pneumothorax  as it relates to the          Lobectomy Procedure                       To respond, click "New Note"  Please document in your progress notes daily for the duration of treatment until resolved and include in your discharge summary.  "

## 2024-06-14 NOTE — PHYSICIAN QUERY
"Please clarify the    Pneumothorax  as it relates to the          Lobectomy Procedure             Present, but not a complication of the procedure          To respond, click "New Note"  Please document in your progress notes daily for the duration of treatment until resolved and include in your discharge summary.  "

## 2024-06-17 ENCOUNTER — TELEPHONE (OUTPATIENT)
Dept: PULMONOLOGY | Facility: CLINIC | Age: 69
End: 2024-06-17
Payer: MEDICARE

## 2024-06-17 NOTE — TELEPHONE ENCOUNTER
Patient was contacted about cancelling her appointment with Dr Baca, being out of the office today.  I explained patient that someone will be contacting her to make a new appointment.  Patient verbalized understanding on instruction given.

## 2024-06-20 ENCOUNTER — OFFICE VISIT (OUTPATIENT)
Dept: PULMONOLOGY | Facility: CLINIC | Age: 69
End: 2024-06-20
Payer: MEDICARE

## 2024-06-20 VITALS
SYSTOLIC BLOOD PRESSURE: 142 MMHG | HEART RATE: 82 BPM | DIASTOLIC BLOOD PRESSURE: 82 MMHG | HEIGHT: 65 IN | OXYGEN SATURATION: 93 % | WEIGHT: 175.25 LBS | BODY MASS INDEX: 29.2 KG/M2

## 2024-06-20 DIAGNOSIS — J90 PLEURAL EFFUSION: ICD-10-CM

## 2024-06-20 DIAGNOSIS — Z17.0 MALIGNANT NEOPLASM OF UPPER-OUTER QUADRANT OF LEFT BREAST IN FEMALE, ESTROGEN RECEPTOR POSITIVE: ICD-10-CM

## 2024-06-20 DIAGNOSIS — C34.92 NSCLC OF LEFT LUNG: Primary | ICD-10-CM

## 2024-06-20 DIAGNOSIS — C50.412 MALIGNANT NEOPLASM OF UPPER-OUTER QUADRANT OF LEFT BREAST IN FEMALE, ESTROGEN RECEPTOR POSITIVE: ICD-10-CM

## 2024-06-20 PROCEDURE — 99999 PR PBB SHADOW E&M-EST. PATIENT-LVL II: CPT | Mod: PBBFAC,,, | Performed by: INTERNAL MEDICINE

## 2024-06-21 PROBLEM — J90 PLEURAL EFFUSION: Status: ACTIVE | Noted: 2024-06-21

## 2024-06-21 NOTE — PROGRESS NOTES
Subjective:       Patient ID: Cara Sharp is a 68 y.o. female.  The patient's last visit with me was on 4/15/2024.     Tolerated lobectomy well. Had pleural effusion on surgical f/u s/p thoracentesis. Recently had unrelated thyroid biopsy.    Pain and muscle spasms are slowly improving and she is increasing her exercise and getting back to normalcy. She feels overall great and very appreciatetive of care.    No SOB, GILLIS, chest pain, chest tightness  Review of Systems    Objective:      Vitals:    06/20/24 1522   BP: (!) 142/82   Pulse: 82     Wt Readings from Last 3 Encounters:   06/20/24 79.5 kg (175 lb 4.3 oz)   05/24/24 80.1 kg (176 lb 9.4 oz)   05/09/24 82.6 kg (182 lb 1.6 oz)     Temp Readings from Last 3 Encounters:   05/11/24 100 °F (37.8 °C) (Oral)   04/09/24 98.1 °F (36.7 °C) (Temporal)   11/28/23 98 °F (36.7 °C) (Oral)     BP Readings from Last 3 Encounters:   06/20/24 (!) 142/82   06/06/24 (!) 157/70   05/24/24 (!) 167/79     Pulse Readings from Last 3 Encounters:   06/20/24 82   06/06/24 79   05/24/24 81       Physical Exam   Constitutional: She is oriented to person, place, and time. She appears well-developed and well-nourished.   HENT:   Head: Normocephalic.   Mouth/Throat: Oropharynx is clear and moist.   Cardiovascular: Normal rate, regular rhythm and normal heart sounds.   Pulmonary/Chest: Normal expansion, symmetric chest wall expansion, effort normal and breath sounds normal.   Abdominal: Soft. She exhibits no distension.   Musculoskeletal:         General: No edema. Normal range of motion.      Cervical back: Neck supple.   Lymphadenopathy:     She has no cervical adenopathy.   Neurological: She is alert and oriented to person, place, and time. Gait normal.   Skin: Skin is warm and dry. No rash noted.   Psychiatric: She has a normal mood and affect. Her behavior is normal. Judgment and thought content normal.   Vitals reviewed.    CBC  Lab Results   Component Value Date    WBC 11.77  "05/09/2024    HGB 13.2 05/09/2024    HCT 38.5 05/09/2024    MCV 93 05/09/2024     05/09/2024         CMP  Sodium   Date Value Ref Range Status   05/01/2024 141 136 - 145 mmol/L Final     Potassium   Date Value Ref Range Status   05/01/2024 4.1 3.5 - 5.1 mmol/L Final     Chloride   Date Value Ref Range Status   05/01/2024 106 95 - 110 mmol/L Final     CO2   Date Value Ref Range Status   05/01/2024 28 23 - 29 mmol/L Final     Glucose   Date Value Ref Range Status   05/01/2024 107 70 - 110 mg/dL Final     BUN   Date Value Ref Range Status   05/01/2024 15 8 - 23 mg/dL Final     Creatinine   Date Value Ref Range Status   05/09/2024 0.7 0.5 - 1.4 mg/dL Final     Calcium   Date Value Ref Range Status   05/01/2024 9.6 8.7 - 10.5 mg/dL Final     Total Protein   Date Value Ref Range Status   05/01/2024 7.6 6.0 - 8.4 g/dL Final     Albumin   Date Value Ref Range Status   05/01/2024 4.1 3.5 - 5.2 g/dL Final     Total Bilirubin   Date Value Ref Range Status   05/01/2024 0.6 0.1 - 1.0 mg/dL Final     Comment:     For infants and newborns, interpretation of results should be based  on gestational age, weight and in agreement with clinical  observations.    Premature Infant recommended reference ranges:  Up to 24 hours.............<8.0 mg/dL  Up to 48 hours............<12.0 mg/dL  3-5 days..................<15.0 mg/dL  6-29 days.................<15.0 mg/dL       Alkaline Phosphatase   Date Value Ref Range Status   05/01/2024 89 55 - 135 U/L Final     AST   Date Value Ref Range Status   05/01/2024 27 10 - 40 U/L Final     ALT   Date Value Ref Range Status   05/01/2024 41 10 - 44 U/L Final     Anion Gap   Date Value Ref Range Status   05/01/2024 7 (L) 8 - 16 mmol/L Final     eGFR   Date Value Ref Range Status   05/09/2024 >60.0 >60 mL/min/1.73 m^2 Final       ABG  No results found for: "PH", "PO2", "PCO2"        Personal Diagnostic Review  I have personally reviewed the following data and added my own interpretation as " "below:  Pathology reviewed and 1.5cm adenocarcinoma with visceral pleural involvement. LN all negative. PDL-1 + at low level. Stage 1B  CXR 6/6/24 images personally reviewed and shows resolution of left pleural effusion with good expansion on lung on left.  Thoracic Sx notes reviewed      6/20/2024     3:22 PM 6/6/2024    10:25 AM 6/6/2024     9:05 AM 5/24/2024     8:56 AM 5/11/2024     3:49 PM 5/11/2024     2:31 PM 5/11/2024    11:56 AM   Pulmonary Function Tests   SpO2 93 % 97 % 96 % 96 % 94 % 95 % 93 %   Height 5' 5" (1.651 m)   5' 5" (1.651 m)      Weight 79.5 kg (175 lb 4.3 oz)   80.1 kg (176 lb 9.4 oz)      BMI (Calculated) 29.2   29.4            Assessment:       1. NSCLC of left lung    2. Malignant neoplasm of upper-outer quadrant of left breast in female, estrogen receptor positive    3. Pleural effusion        Outpatient Encounter Medications as of 6/20/2024   Medication Sig Dispense Refill    acetaminophen (TYLENOL) 500 MG tablet Take 2 tablets (1,000 mg total) by mouth every 8 (eight) hours. 60 tablet 0    atorvastatin (LIPITOR) 40 MG tablet Take 1 tablet by mouth every evening.      ciclopirox (PENLAC) 8 % Soln Apply topically nightly. Remove once a day with nail polish remover 6.6 mL 1    ergocalciferol (ERGOCALCIFEROL) 50,000 unit Cap Take by mouth every 7 days. WEDNESDAYS      gabapentin (NEURONTIN) 300 MG capsule Take 1 capsule (300 mg total) by mouth every evening. 30 capsule 11    letrozole (FEMARA) 2.5 mg Tab TAKE 1 TABLET EVERY DAY 90 tablet 3    levothyroxine (SYNTHROID) 88 MCG tablet Take by mouth before breakfast.      oxyCODONE (ROXICODONE) 5 MG immediate release tablet Take 1 tablet (5 mg total) by mouth every 4 (four) hours as needed for Pain. 20 tablet 0    paroxetine (PAXIL) 30 MG tablet Take 30 mg by mouth every morning.      senna-docusate 8.6-50 mg (PERICOLACE) 8.6-50 mg per tablet Take 1 tablet by mouth once daily. 20 tablet 0     Facility-Administered Encounter " Medications as of 6/20/2024   Medication Dose Route Frequency Provider Last Rate Last Admin    0.9%  NaCl infusion   Intravenous Continuous Shelbie Horwoitz MD 70 mL/hr at 12/03/21 0630 New Bag at 12/03/21 0630    celecoxib capsule 400 mg  400 mg Oral Once Yonas Werner MD        fentaNYL 50 mcg/mL injection  mcg   mcg Intravenous PRN Yonas Werner MD   100 mcg at 12/03/21 0650    LIDOcaine (PF) 10 mg/ml (1%) injection 10 mg  1 mL Intradermal Once Yonas Werner MD        midazolam (VERSED) 1 mg/mL injection 0.5-4 mg  0.5-4 mg Intravenous PRN Yonas Werner MD   2 mg at 12/03/21 0650     1. NSCLC of left lung  - CT Chest Without Contrast; Future    2. Malignant neoplasm of upper-outer quadrant of left breast in female, estrogen receptor positive    3. Pleural effusion    Plan:     Problem List Items Addressed This Visit          Pulmonary    Pleural effusion    Current Assessment & Plan     Likely 2/2 post procedure inflammation. If recurrs will repeat thoracentesis in office and start course of anti-inflammatories. No significant effusion on exam.            Oncology    Malignant neoplasm of upper-outer quadrant of left breast in female, estrogen receptor positive    Current Assessment & Plan     Will try to coordinate scans to avoid excess radiation.         NSCLC of left lung - Primary    Current Assessment & Plan     Significant chronic condition to be followed longitudinally with following long term treatment plan.     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 PDL-1 low level activity.  Unclear benefit of adjuvant therapy in Stage 1B and she has no high risk features.   Discussed risks of adjuvant therapy outweigh benefits at this time.    CT Chest at 6 months and repeat q6 months for 1st 2 years, then yearly until 5 years out.         Relevant Orders    CT Chest Without Contrast       Please note Overview Notes are historic documentation. Please review A/P for  current updates.  No follow-ups on file.    Future Appointments   Date Time Provider Department Center   6/27/2024  9:40 AM Jose Bean MD [facility]         Jonathan Baca MD

## 2024-06-27 ENCOUNTER — OFFICE VISIT (OUTPATIENT)
Dept: HEMATOLOGY/ONCOLOGY | Facility: CLINIC | Age: 69
End: 2024-06-27
Payer: MEDICARE

## 2024-06-27 VITALS
BODY MASS INDEX: 28.98 KG/M2 | OXYGEN SATURATION: 99 % | DIASTOLIC BLOOD PRESSURE: 70 MMHG | WEIGHT: 173.94 LBS | HEART RATE: 67 BPM | SYSTOLIC BLOOD PRESSURE: 147 MMHG | RESPIRATION RATE: 20 BRPM | HEIGHT: 65 IN

## 2024-06-27 DIAGNOSIS — C50.412 MALIGNANT NEOPLASM OF UPPER-OUTER QUADRANT OF LEFT BREAST IN FEMALE, ESTROGEN RECEPTOR POSITIVE: Primary | ICD-10-CM

## 2024-06-27 DIAGNOSIS — Z17.0 MALIGNANT NEOPLASM OF UPPER-OUTER QUADRANT OF LEFT BREAST IN FEMALE, ESTROGEN RECEPTOR POSITIVE: Primary | ICD-10-CM

## 2024-06-27 PROCEDURE — 99999 PR PBB SHADOW E&M-EST. PATIENT-LVL IV: CPT | Mod: PBBFAC,,, | Performed by: INTERNAL MEDICINE

## 2024-06-27 NOTE — PROGRESS NOTES
Subjective:       Patient ID: Cara Sharp is a 68 y.o. female.    Chief Complaint: No chief complaint on file.      HPI 68-year-old female seen in Oncology Clinic for follow-up of a diagnosis of left breast cancer.She is on adjuvant letrozole.  She is a patient of .       She had a slowly enlarging LLL nodule. Needle BX 4/9/24 - lung adenocarcinoma.   On 5/9/24 she had LLL lobectomy. Path showed a 1.5 cm moderately differentiated adenocarcinoma with negative hilar and mediastinal nodes.  She she has been recovering well from that surgery.She is very happy with her care !    She has no residual shortness of breath.  She denies any significant pain.      F/U CT 8/15/23 - Left lower lobe solid nodule measures 13 x 12 mm, previously 13 x 9 mm.  Stable pulmonary micro nodules bilaterally.  No new or enlarging nodules.    Upper Abdomen: Stable appearance of subcentimeter hepatic and splenic hypodensities, too small to characterize but favored to represent cysts.     Bones: Similar size and appearance of sclerotic lesion within the sternum, measuring 1.5 cm.  Stable T4 sclerotic focus, likely bone island.      Breast cancer history:  Mammogram on October 12, 2020 demonstrated an irregular mass in the upper outer quadrant left breast measuring 1.4 cm. Ultrasound showed a 1.2 cm hypoechoic mass.    On October 25, 2021 biopsy was performed which showed infiltrating ductal carcinoma with lobular features with some associated intermediate grade DCIS (histologic grade 3, nuclear grade 2, mitotic index 1).  Tumor cells were 96% ER positive, 95% KS positive, HER2 was 1+ and Ki-67 was 9%.  Largest focus was 10 mm.      On 2/7/22 she underwent bilateral nipple sparing mastectomy with autologous reconstruction - KOKO flaps  ([facility] - Dr. Butcher)  The pathology from that surgery showed 2 mm of residual invasive cancer in the left breast with 3 negative sentinel lymph nodes.T1bNo.  The right breast showed no  significant abnormality.    Oncotype score was 6.    Letrozole started March 2022.    BMD normal in March 2022.        Pre surgical CT of the abdomen and pelvis on 2/3/22  showed a 9 mm left hepatic density and a 1 cm LLL pulmonary nodule.    CT 2/10/23 - Lungs/Pleura: Prominent bandlike opacities within the bilateral lower lobes suggestive of scarring or atelectasis.  Along the periphery of the left lower lobe and there is a 1.3 x 0.9 cm nodule.  No other concerning nodules within the lungs.     Hilum/Mediastinum: no hilar or mediastinal lymph node enlargement.   Liver: The liver is normal in size and attenuation.  Subcentimeter hypodensity in the left hepatic lobe likely cyst.  No evidence of concerning hepatic lesions.   Lymph Nodes: Borderline size aortocaval lymph node up to 10 mm in the short axis (3-84)..     Bones: 1.4 cm mixed lucent and sclerotic lesion within the sternum.  Additional punctate focus of sclerosis within the spinous process of T4, likely bone island.  No evidence of other suspicious lesions throughout the bones.      Reviewed CTs - Feb 2022 and Feb 2023.  LLL nodule - unchanged.  The sternal changes were not seen on CT(did not cover that area) but may have been there on MRI from December 2021.    CT chest 2/28/24 -   Lungs/Pleura: Dense bands of atelectasis versus scarring in the bilateral lung bases.  Left lower lobe solid pulmonary nodule measuring 1.4 x 1.7 cm (4-357), previously measuring 1.3 x 1.2 cm.  New nodular opacity along the right lower lobe posterior pleura (4-305), likely related to atelectasis.  No new suspicious pulmonary nodules.  No pleural effusion or pneumothorax.      Review of Systems   Constitutional:  Negative for activity change, appetite change, fever and unexpected weight change.   HENT:  Positive for hearing loss. Negative for mouth sores.    Eyes:  Negative for visual disturbance.   Respiratory:  Negative for cough and shortness of breath.    Cardiovascular:   Negative for chest pain.   Gastrointestinal:  Negative for abdominal pain and diarrhea.   Genitourinary: Negative.  Negative for frequency.   Musculoskeletal:  Negative for back pain.   Integumentary:  Negative for rash.   Neurological:  Negative for headaches.   Hematological:  Negative for adenopathy.   Psychiatric/Behavioral:  The patient is not nervous/anxious.          Objective:      Physical Exam  Vitals reviewed.   Constitutional:       General: She is not in acute distress.  Eyes:      General: No scleral icterus.  Cardiovascular:      Rate and Rhythm: Normal rate and regular rhythm.   Pulmonary:      Effort: Pulmonary effort is normal. No respiratory distress.      Breath sounds: No wheezing or rales.      Comments: Some decreased BS left base  Chest:       Abdominal:      Palpations: Abdomen is soft. There is no mass.      Tenderness: There is no abdominal tenderness.   Lymphadenopathy:      Cervical: No cervical adenopathy.      Upper Body:      Right upper body: No supraclavicular or axillary adenopathy.      Left upper body: No supraclavicular or axillary adenopathy.   Neurological:      Mental Status: She is alert and oriented to person, place, and time.   Psychiatric:         Mood and Affect: Mood normal.         Behavior: Behavior normal.         Thought Content: Thought content normal.         Judgment: Judgment normal.         Assessment:       Problem List Items Addressed This Visit       Malignant neoplasm of upper-outer quadrant of left breast in female, estrogen receptor positive - Primary       Plan:     Continue letrozole.  RTC 4 M     Refer to pulmonary       Route Chart for Scheduling    Med Onc Chart Routing      Follow up with physician 4 months.   Follow up with [facility]    Infusion scheduling note    Injection scheduling note    Labs None   Scheduling:  Preferred lab:  Lab interval:     Imaging None      Pharmacy appointment No pharmacy appointment needed      Other referrals no referral to  Oncology Primary Care needed -  no Massage appointment needed    No additional referrals needed

## 2024-10-24 ENCOUNTER — OFFICE VISIT (OUTPATIENT)
Dept: HEMATOLOGY/ONCOLOGY | Facility: CLINIC | Age: 69
End: 2024-10-24
Payer: MEDICARE

## 2024-10-24 VITALS
BODY MASS INDEX: 28.25 KG/M2 | HEART RATE: 78 BPM | TEMPERATURE: 98 F | HEIGHT: 65 IN | DIASTOLIC BLOOD PRESSURE: 75 MMHG | RESPIRATION RATE: 18 BRPM | SYSTOLIC BLOOD PRESSURE: 133 MMHG | OXYGEN SATURATION: 97 % | WEIGHT: 169.56 LBS

## 2024-10-24 DIAGNOSIS — C50.412 MALIGNANT NEOPLASM OF UPPER-OUTER QUADRANT OF LEFT BREAST IN FEMALE, ESTROGEN RECEPTOR POSITIVE: Primary | ICD-10-CM

## 2024-10-24 DIAGNOSIS — Z17.0 MALIGNANT NEOPLASM OF UPPER-OUTER QUADRANT OF LEFT BREAST IN FEMALE, ESTROGEN RECEPTOR POSITIVE: Primary | ICD-10-CM

## 2024-10-24 DIAGNOSIS — Z79.811 AROMATASE INHIBITOR USE: ICD-10-CM

## 2024-10-24 PROCEDURE — 99999 PR PBB SHADOW E&M-EST. PATIENT-LVL III: CPT | Mod: PBBFAC,,, | Performed by: INTERNAL MEDICINE

## 2024-10-24 NOTE — PROGRESS NOTES
Subjective:       Patient ID: Cara Sharp is a 69 y.o. female.    Chief Complaint: No chief complaint on file.      HPI 69-year-old female seen in Oncology Clinic for follow-up of a diagnosis of left breast cancer.She is on adjuvant letrozole.     She has been walking watching her diet in his lost some weight.    Her breathing has been good she has no unusual pain.  She has had pneumonia and flu shot.  She has a follow-up chest CT and pulmonary visit coming up.    She did increase her depression and anxiety medication.        Breast cancer history:  Mammogram on October 12, 2020 demonstrated an irregular mass in the upper outer quadrant left breast measuring 1.4 cm. Ultrasound showed a 1.2 cm hypoechoic mass.    On October 25, 2021 biopsy was performed which showed infiltrating ductal carcinoma with lobular features with some associated intermediate grade DCIS (histologic grade 3, nuclear grade 2, mitotic index 1).  Tumor cells were 96% ER positive, 95% UT positive, HER2 was 1+ and Ki-67 was 9%.  Largest focus was 10 mm.      On 2/7/22 she underwent bilateral nipple sparing mastectomy with autologous reconstruction - KOKO flaps  ([facility] - Bettina Rosario)  The pathology from that surgery showed 2 mm of residual invasive cancer in the left breast with 3 negative sentinel lymph nodes.T1bNo.  The right breast showed no significant abnormality.    Oncotype score was 6.    Letrozole started March 2022.    BMD normal in March 2022.    Pre surgical CT of the abdomen and pelvis on 2/3/22  showed a 9 mm left hepatic density and a 1 cm LLL pulmonary nodule.    CT 2/10/23 - Lungs/Pleura: Prominent bandlike opacities within the bilateral lower lobes suggestive of scarring or atelectasis.  Along the periphery of the left lower lobe and there is a 1.3 x 0.9 cm nodule.  No other concerning nodules within the lungs.     Hilum/Mediastinum: no hilar or mediastinal lymph node enlargement.   Liver: The liver is normal  in size and attenuation.  Subcentimeter hypodensity in the left hepatic lobe likely cyst.  No evidence of concerning hepatic lesions.   Lymph Nodes: Borderline size aortocaval lymph node up to 10 mm in the short axis (3-84)..     Bones: 1.4 cm mixed lucent and sclerotic lesion within the sternum.  Additional punctate focus of sclerosis within the spinous process of T4, likely bone island.  No evidence of other suspicious lesions throughout the bones.      Reviewed CTs - Feb 2022 and Feb 2023.  LLL nodule - unchanged.  The sternal changes were not seen on CT(did not cover that area) but may have been there on MRI from December 2021.      F/U CT 8/15/23 - Left lower lobe solid nodule measures 13 x 12 mm, previously 13 x 9 mm.  Stable pulmonary micro nodules bilaterally.  No new or enlarging nodules.    Upper Abdomen: Stable appearance of subcentimeter hepatic and splenic hypodensities, too small to characterize but favored to represent cysts.     Bones: Similar size and appearance of sclerotic lesion within the sternum, measuring 1.5 cm.  Stable T4 sclerotic focus, likely bone island.    CT chest 2/28/24 -   Lungs/Pleura: Dense bands of atelectasis versus scarring in the bilateral lung bases.  Left lower lobe solid pulmonary nodule measuring 1.4 x 1.7 cm (4-357), previously measuring 1.3 x 1.2 cm.  New nodular opacity along the right lower lobe posterior pleura (4-305), likely related to atelectasis.  No new suspicious pulmonary nodules.  No pleural effusion or pneumothorax.       She had a slowly enlarging LLL nodule. Needle BX 4/9/24 - lung adenocarcinoma.   On 5/9/24 she had LLL lobectomy. Path showed a 1.5 cm moderately differentiated adenocarcinoma with negative hilar and mediastinal nodes.     Review of Systems   Constitutional:  Negative for activity change, appetite change, fever and unexpected weight change.   HENT:  Positive for hearing loss. Negative for mouth sores.    Eyes:  Negative for visual  disturbance.   Respiratory:  Negative for cough and shortness of breath.    Cardiovascular:  Negative for chest pain (Occasional left chest soreness).   Gastrointestinal:  Negative for abdominal pain and diarrhea.   Genitourinary: Negative.  Negative for frequency.   Musculoskeletal:  Negative for back pain.   Integumentary:  Negative for rash.   Neurological:  Negative for headaches.   Hematological:  Negative for adenopathy.   Psychiatric/Behavioral:  Positive for sleep disturbance. The patient is nervous/anxious.          Objective:      Physical Exam  Vitals reviewed.   Constitutional:       General: She is not in acute distress.  Eyes:      General: No scleral icterus.  Cardiovascular:      Rate and Rhythm: Normal rate and regular rhythm.   Pulmonary:      Effort: Pulmonary effort is normal. No respiratory distress.      Breath sounds: No wheezing or rales.      Comments: Some decreased BS left base  Chest:       Abdominal:      Palpations: Abdomen is soft. There is no mass.      Tenderness: There is no abdominal tenderness.   Lymphadenopathy:      Cervical: No cervical adenopathy.      Upper Body:      Right upper body: No supraclavicular or axillary adenopathy.      Left upper body: No supraclavicular or axillary adenopathy.   Neurological:      Mental Status: She is alert and oriented to person, place, and time.   Psychiatric:         Mood and Affect: Mood normal.         Behavior: Behavior normal.         Thought Content: Thought content normal.         Judgment: Judgment normal.       Assessment:       Problem List Items Addressed This Visit       Malignant neoplasm of upper-outer quadrant of left breast in female, estrogen receptor positive - Primary       Plan:     Continue letrozole.  Due for DEXA  RTC 4 M     Refer to pulmonary       Route Chart for Scheduling    Med Onc Chart Routing      Follow up with physician 4 months.   Follow up with [facility]    Infusion scheduling note    Injection scheduling note     Labs None   Scheduling:  Preferred lab:  Lab interval:     Imaging DXA scan   before next visit   Pharmacy appointment No pharmacy appointment needed      Other referrals no referral to Oncology Primary Care needed -  no Massage appointment needed    No additional referrals needed

## 2024-11-11 ENCOUNTER — HOSPITAL ENCOUNTER (OUTPATIENT)
Dept: RADIOLOGY | Facility: HOSPITAL | Age: 69
Discharge: HOME | End: 2024-11-11
Attending: INTERNAL MEDICINE
Payer: MEDICARE

## 2024-11-11 ENCOUNTER — OFFICE VISIT (OUTPATIENT)
Dept: PULMONOLOGY | Facility: CLINIC | Age: 69
End: 2024-11-11
Payer: MEDICARE

## 2024-11-11 VITALS
BODY MASS INDEX: 28.36 KG/M2 | OXYGEN SATURATION: 95 % | SYSTOLIC BLOOD PRESSURE: 149 MMHG | HEART RATE: 76 BPM | DIASTOLIC BLOOD PRESSURE: 83 MMHG | WEIGHT: 170.44 LBS

## 2024-11-11 DIAGNOSIS — C34.92 NSCLC OF LEFT LUNG: Primary | ICD-10-CM

## 2024-11-11 DIAGNOSIS — J90 PLEURAL EFFUSION: ICD-10-CM

## 2024-11-11 DIAGNOSIS — C34.92 NSCLC OF LEFT LUNG: ICD-10-CM

## 2024-11-11 PROCEDURE — 1159F MED LIST DOCD IN RCRD: CPT | Mod: CPTII,S$GLB,, | Performed by: INTERNAL MEDICINE

## 2024-11-11 PROCEDURE — 3008F BODY MASS INDEX DOCD: CPT | Mod: CPTII,S$GLB,, | Performed by: INTERNAL MEDICINE

## 2024-11-11 PROCEDURE — 3077F SYST BP >= 140 MM HG: CPT | Mod: CPTII,S$GLB,, | Performed by: INTERNAL MEDICINE

## 2024-11-11 PROCEDURE — 71250 CT THORAX DX C-: CPT | Mod: TC

## 2024-11-11 PROCEDURE — 99214 OFFICE O/P EST MOD 30 MIN: CPT | Mod: S$GLB,,, | Performed by: INTERNAL MEDICINE

## 2024-11-11 PROCEDURE — G2211 COMPLEX E/M VISIT ADD ON: HCPCS | Mod: S$GLB,,, | Performed by: INTERNAL MEDICINE

## 2024-11-11 PROCEDURE — 1126F AMNT PAIN NOTED NONE PRSNT: CPT | Mod: CPTII,S$GLB,, | Performed by: INTERNAL MEDICINE

## 2024-11-11 PROCEDURE — 3079F DIAST BP 80-89 MM HG: CPT | Mod: CPTII,S$GLB,, | Performed by: INTERNAL MEDICINE

## 2024-11-11 PROCEDURE — 1101F PT FALLS ASSESS-DOCD LE1/YR: CPT | Mod: CPTII,S$GLB,, | Performed by: INTERNAL MEDICINE

## 2024-11-11 PROCEDURE — 99999 PR PBB SHADOW E&M-EST. PATIENT-LVL III: CPT | Mod: PBBFAC,,, | Performed by: INTERNAL MEDICINE

## 2024-11-11 PROCEDURE — 71250 CT THORAX DX C-: CPT | Mod: 26,,, | Performed by: STUDENT IN AN ORGANIZED HEALTH CARE EDUCATION/TRAINING PROGRAM

## 2024-11-11 PROCEDURE — 3288F FALL RISK ASSESSMENT DOCD: CPT | Mod: CPTII,S$GLB,, | Performed by: INTERNAL MEDICINE

## 2024-11-11 RX ORDER — VIT C/E/ZN/COPPR/LUTEIN/ZEAXAN 250MG-90MG
500 CAPSULE ORAL DAILY
COMMUNITY

## 2024-11-11 NOTE — ASSESSMENT & PLAN NOTE
Likely 2/2 post procedure inflammation. If recurrs will repeat thoracentesis in office and start course of anti-inflammatories. No significant effusion on today's exam

## 2024-11-11 NOTE — ASSESSMENT & PLAN NOTE
Significant chronic condition to be followed longitudinally with following long term treatment plan.     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 PDL-1 low level activity.  Unclear benefit of adjuvant therapy in Stage 1B and she has no high risk features.   Discussed risks of adjuvant therapy outweigh benefits at this time.    CT Chest at 6 months and repeat q6 months for 1st 2 years, then yearly until 5 years out.

## 2024-11-11 NOTE — PROGRESS NOTES
Subjective:       Patient ID: Cara Sharp is a 69 y.o. female.  The patient's last visit with me was on 6/20/2024.     No new complaints. Feels great. No recurrent pleural symptoms. No recurrent effusion since prior drainage.     Discussed RSV vaccine.    Follow-up  Review of Systems    Objective:      Vitals:    11/11/24 1526   BP: (!) 149/83   Pulse: 76     Wt Readings from Last 3 Encounters:   11/11/24 77.3 kg (170 lb 6.7 oz)   10/24/24 76.9 kg (169 lb 8.5 oz)   06/27/24 78.9 kg (173 lb 15.1 oz)     Temp Readings from Last 3 Encounters:   10/24/24 97.9 °F (36.6 °C) (Oral)   05/11/24 100 °F (37.8 °C) (Oral)   04/09/24 98.1 °F (36.7 °C) (Temporal)     BP Readings from Last 3 Encounters:   11/11/24 (!) 149/83   10/24/24 133/75   06/27/24 (!) 147/70     Pulse Readings from Last 3 Encounters:   11/11/24 76   10/24/24 78   06/27/24 67       Physical Exam   Constitutional: She appears well-developed and well-nourished.   Pulmonary/Chest: Effort normal.   Vitals reviewed.    CBC  Lab Results   Component Value Date    WBC 11.77 05/09/2024    HGB 13.2 05/09/2024    HCT 38.5 05/09/2024    MCV 93 05/09/2024     05/09/2024         CMP  Sodium   Date Value Ref Range Status   08/19/2024 143 135 - 146 mmol/L Final   05/01/2024 141 136 - 145 mmol/L Final     Potassium   Date Value Ref Range Status   08/19/2024 3.7 3.5 - 5.3 mmol/L Final   05/01/2024 4.1 3.5 - 5.1 mmol/L Final     Chloride   Date Value Ref Range Status   05/01/2024 106 95 - 110 mmol/L Final     CO2   Date Value Ref Range Status   05/01/2024 28 23 - 29 mmol/L Final     Carbon Dioxide   Date Value Ref Range Status   08/19/2024 30 20 - 32 mmol/L Final     Glucose   Date Value Ref Range Status   08/19/2024 106 (H) 65 - 99 mg/dL Final     Comment:     For someone without known diabetes, a glucose value  between 100 and 125 mg/dL is consistent with  prediabetes and should be confirmed with a  follow-up test.                 Fasting reference interval  "  05/01/2024 107 70 - 110 mg/dL Final     BUN   Date Value Ref Range Status   05/01/2024 15 8 - 23 mg/dL Final     Blood Urea Nitrogen   Date Value Ref Range Status   08/19/2024 10 7 - 25 mg/dL Final     Creatinine   Date Value Ref Range Status   08/19/2024 0.66 0.50 - 1.05 mg/dL Final   05/09/2024 0.7 0.5 - 1.4 mg/dL Final     Calcium   Date Value Ref Range Status   08/19/2024 9.0 8.6 - 10.4 mg/dL Final   05/01/2024 9.6 8.7 - 10.5 mg/dL Final     Total Protein   Date Value Ref Range Status   05/01/2024 7.6 6.0 - 8.4 g/dL Final     Albumin   Date Value Ref Range Status   05/01/2024 4.1 3.5 - 5.2 g/dL Final     Albumin Level   Date Value Ref Range Status   08/19/2024 4.1 3.6 - 5.1 g/dL Final     Total Bilirubin   Date Value Ref Range Status   08/19/2024 0.4 0.2 - 1.2 mg/dL Final   05/01/2024 0.6 0.1 - 1.0 mg/dL Final     Comment:     For infants and newborns, interpretation of results should be based  on gestational age, weight and in agreement with clinical  observations.    Premature Infant recommended reference ranges:  Up to 24 hours.............<8.0 mg/dL  Up to 48 hours............<12.0 mg/dL  3-5 days..................<15.0 mg/dL  6-29 days.................<15.0 mg/dL       Alkaline Phosphatase   Date Value Ref Range Status   08/19/2024 94 37 - 153 U/L Final   05/01/2024 89 55 - 135 U/L Final     AST   Date Value Ref Range Status   08/19/2024 22 10 - 35 U/L Final   05/01/2024 27 10 - 40 U/L Final     ALT   Date Value Ref Range Status   08/19/2024 29 6 - 29 U/L Final   05/01/2024 41 10 - 44 U/L Final     Anion Gap   Date Value Ref Range Status   05/01/2024 7 (L) 8 - 16 mmol/L Final     eGFR   Date Value Ref Range Status   08/19/2024 96 > OR = 60 mL/min/1.73m2 Final   05/09/2024 >60.0 >60 mL/min/1.73 m^2 Final       ABG  No results found for: "PH", "PO2", "PCO2"        Personal Diagnostic Review  I have personally reviewed the following data and added my own interpretation as below:  CT Chest 11/11/24 images " "personally reviewed and s/p LLLectomy, no mediastinal LAD, lung masses appreciated; awaiting final radiology read      11/11/2024     3:26 PM 10/24/2024     9:35 AM 6/27/2024     9:48 AM 6/20/2024     3:22 PM 6/6/2024    10:25 AM 6/6/2024     9:05 AM 5/24/2024     8:56 AM   Pulmonary Function Tests   SpO2 95 % 97 % 99 % 93 % 97 % 96 % 96 %   Height  5' 5" (1.651 m) 5' 5" (1.651 m) 5' 5" (1.651 m)   5' 5" (1.651 m)   Weight 77.3 kg (170 lb 6.7 oz) 76.9 kg (169 lb 8.5 oz) 78.9 kg (173 lb 15.1 oz) 79.5 kg (175 lb 4.3 oz)   80.1 kg (176 lb 9.4 oz)   BMI (Calculated)  28.2 28.9 29.2   29.4         Assessment:       1. NSCLC of left lung    2. Pleural effusion        Outpatient Encounter Medications as of 11/11/2024   Medication Sig Dispense Refill    atorvastatin (LIPITOR) 40 MG tablet Take 1 tablet by mouth every evening.      ergocalciferol (ERGOCALCIFEROL) 50,000 unit Cap Take by mouth every 7 days. WEDNESDAYS      levothyroxine (SYNTHROID) 88 MCG tablet Take by mouth before breakfast.      paroxetine (PAXIL) 40 MG tablet Take 40 mg by mouth every morning.      ciclopirox (PENLAC) 8 % Soln Apply topically nightly. Remove once a day with nail polish remover (Patient not taking: Reported on 11/11/2024) 6.6 mL 1    cyanocobalamin (VITAMIN B-12) 500 MCG tablet Take 500 mcg by mouth once daily.      letrozole (FEMARA) 2.5 mg Tab TAKE 1 TABLET EVERY DAY (Patient not taking: Reported on 11/11/2024.) 90 tablet 3    [DISCONTINUED] acetaminophen (TYLENOL) 500 MG tablet Take 2 tablets (1,000 mg total) by mouth every 8 (eight) hours. 60 tablet 0    [DISCONTINUED] gabapentin (NEURONTIN) 300 MG capsule Take 1 capsule (300 mg total) by mouth every evening. 30 capsule 11    [DISCONTINUED] oxyCODONE (ROXICODONE) 5 MG immediate release tablet Take 1 tablet (5 mg total) by mouth every 4 (four) hours as needed for Pain. 20 tablet 0    [DISCONTINUED] senna-docusate 8.6-50 mg (PERICOLACE) 8.6-50 mg per tablet Take 1 tablet by " mouth once daily. 20 tablet 0     Facility-Administered Encounter Medications as of 11/11/2024   Medication Dose Route Frequency Provider Last Rate Last Admin    0.9%  NaCl infusion   Intravenous Continuous Alongi, Shelbie MARINO MD 70 mL/hr at 12/03/21 0630 New Bag at 12/03/21 0630    celecoxib capsule 400 mg  400 mg Oral Once Yonas Werner MD        fentaNYL 50 mcg/mL injection  mcg   mcg Intravenous PRN Yonas Werner MD   100 mcg at 12/03/21 0650    LIDOcaine (PF) 10 mg/ml (1%) injection 10 mg  1 mL Intradermal Once Yonas Werner MD        midazolam (VERSED) 1 mg/mL injection 0.5-4 mg  0.5-4 mg Intravenous PRN Yonas Werner MD   2 mg at 12/03/21 0650     1. NSCLC of left lung  - CT Chest Without Contrast; Future    2. Pleural effusion    Plan:     Problem List Items Addressed This Visit          Pulmonary    Pleural effusion    Current Assessment & Plan     Likely 2/2 post procedure inflammation. If recurrs will repeat thoracentesis in office and start course of anti-inflammatories. No significant effusion on today's exam            Oncology    NSCLC of left lung - Primary    Current Assessment & Plan     Significant chronic condition to be followed longitudinally with following long term treatment plan.     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 PDL-1 low level activity.  Unclear benefit of adjuvant therapy in Stage 1B and she has no high risk features.   Discussed risks of adjuvant therapy outweigh benefits at this time.    CT Chest at 6 months and repeat q6 months for 1st 2 years, then yearly until 5 years out.         Relevant Orders    CT Chest Without Contrast       Please note Overview Notes are historic documentation. Please review A/P for current updates.  No follow-ups on file.    Future Appointments   Date Time Provider Department Center   2/20/2025  9:00 AM [facility], [id number] [facility] [address]   2/24/2025  8:40 AM Jose Bean MD [facility] [id number] Dany Blackman          Jonathan Baca MD

## 2025-02-20 ENCOUNTER — HOSPITAL ENCOUNTER (OUTPATIENT)
Dept: RADIOLOGY | Facility: CLINIC | Age: 70
Discharge: HOME | End: 2025-02-20
Attending: INTERNAL MEDICINE
Payer: MEDICARE

## 2025-02-20 DIAGNOSIS — Z79.811 AROMATASE INHIBITOR USE: ICD-10-CM

## 2025-02-20 PROCEDURE — 77080 DXA BONE DENSITY AXIAL: CPT | Mod: TC

## 2025-02-24 ENCOUNTER — OFFICE VISIT (OUTPATIENT)
Dept: HEMATOLOGY/ONCOLOGY | Facility: CLINIC | Age: 70
End: 2025-02-24
Payer: MEDICARE

## 2025-02-24 VITALS
RESPIRATION RATE: 20 BRPM | BODY MASS INDEX: 28.43 KG/M2 | HEIGHT: 65 IN | HEART RATE: 85 BPM | OXYGEN SATURATION: 95 % | DIASTOLIC BLOOD PRESSURE: 72 MMHG | TEMPERATURE: 98 F | WEIGHT: 170.63 LBS | SYSTOLIC BLOOD PRESSURE: 165 MMHG

## 2025-02-24 DIAGNOSIS — C50.412 MALIGNANT NEOPLASM OF UPPER-OUTER QUADRANT OF LEFT BREAST IN FEMALE, ESTROGEN RECEPTOR POSITIVE: Primary | ICD-10-CM

## 2025-02-24 DIAGNOSIS — M85.89 OSTEOPENIA OF MULTIPLE SITES: ICD-10-CM

## 2025-02-24 DIAGNOSIS — Z17.0 MALIGNANT NEOPLASM OF UPPER-OUTER QUADRANT OF LEFT BREAST IN FEMALE, ESTROGEN RECEPTOR POSITIVE: Primary | ICD-10-CM

## 2025-02-24 PROCEDURE — 3288F FALL RISK ASSESSMENT DOCD: CPT | Mod: CPTII,S$GLB,, | Performed by: INTERNAL MEDICINE

## 2025-02-24 PROCEDURE — 99213 OFFICE O/P EST LOW 20 MIN: CPT | Mod: S$GLB,,, | Performed by: INTERNAL MEDICINE

## 2025-02-24 PROCEDURE — 3008F BODY MASS INDEX DOCD: CPT | Mod: CPTII,S$GLB,, | Performed by: INTERNAL MEDICINE

## 2025-02-24 PROCEDURE — 1126F AMNT PAIN NOTED NONE PRSNT: CPT | Mod: CPTII,S$GLB,, | Performed by: INTERNAL MEDICINE

## 2025-02-24 PROCEDURE — 1101F PT FALLS ASSESS-DOCD LE1/YR: CPT | Mod: CPTII,S$GLB,, | Performed by: INTERNAL MEDICINE

## 2025-02-24 PROCEDURE — 1159F MED LIST DOCD IN RCRD: CPT | Mod: CPTII,S$GLB,, | Performed by: INTERNAL MEDICINE

## 2025-02-24 PROCEDURE — 3044F HG A1C LEVEL LT 7.0%: CPT | Mod: CPTII,S$GLB,, | Performed by: INTERNAL MEDICINE

## 2025-02-24 PROCEDURE — 99999 PR PBB SHADOW E&M-EST. PATIENT-LVL III: CPT | Mod: PBBFAC,,, | Performed by: INTERNAL MEDICINE

## 2025-02-24 PROCEDURE — 3078F DIAST BP <80 MM HG: CPT | Mod: CPTII,S$GLB,, | Performed by: INTERNAL MEDICINE

## 2025-02-24 PROCEDURE — 3077F SYST BP >= 140 MM HG: CPT | Mod: CPTII,S$GLB,, | Performed by: INTERNAL MEDICINE

## 2025-02-24 RX ORDER — IBANDRONATE SODIUM 150 MG/1
150 TABLET, FILM COATED ORAL
Qty: 4 TABLET | Refills: 3 | Status: SHIPPED | OUTPATIENT
Start: 2025-02-24 | End: 2026-02-24

## 2025-02-24 NOTE — PROGRESS NOTES
Subjective:       Patient ID: Cara Sharp is a 69 y.o. female.    Chief Complaint: No chief complaint on file.      HPI 69-year-old female seen in Oncology Clinic for follow-up of a diagnosis of left breast cancer.She is on adjuvant letrozole.     Overall, she is doing well without pain or respiratory complaints. Going to the Gym weekly.      Breast cancer history:  Mammogram on October 12, 2020 demonstrated an irregular mass in the upper outer quadrant left breast measuring 1.4 cm. Ultrasound showed a 1.2 cm hypoechoic mass.    On October 25, 2021 biopsy was performed which showed infiltrating ductal carcinoma with lobular features with some associated intermediate grade DCIS (histologic grade 3, nuclear grade 2, mitotic index 1).  Tumor cells were 96% ER positive, 95% DC positive, HER2 was 1+ and Ki-67 was 9%.  Largest focus was 10 mm.      On 2/7/22 she underwent bilateral nipple sparing mastectomy with autologous reconstruction - KOKO flaps  ([facility])  The pathology from that surgery showed 2 mm of residual invasive cancer in the left breast with 3 negative sentinel lymph nodes.T1bNo.  The right breast showed no significant abnormality.    Oncotype score was 6.    Letrozole started March 2022.    BMD normal in March 2022.    Pre surgical CT of the abdomen and pelvis on 2/3/22  showed a 9 mm left hepatic density and a 1 cm LLL pulmonary nodule.    CT 2/10/23 - Lungs/Pleura: Prominent bandlike opacities within the bilateral lower lobes suggestive of scarring or atelectasis.  Along the periphery of the left lower lobe and there is a 1.3 x 0.9 cm nodule.  No other concerning nodules within the lungs.     Hilum/Mediastinum: no hilar or mediastinal lymph node enlargement.   Liver: The liver is normal in size and attenuation.  Subcentimeter hypodensity in the left hepatic lobe likely cyst.  No evidence of concerning hepatic lesions.   Lymph Nodes: Borderline size aortocaval lymph node  up to 10 mm in the short axis (3-84)..     Bones: 1.4 cm mixed lucent and sclerotic lesion within the sternum.  Additional punctate focus of sclerosis within the spinous process of T4, likely bone island.  No evidence of other suspicious lesions throughout the bones.      Reviewed CTs - Feb 2022 and Feb 2023.  LLL nodule - unchanged.  The sternal changes were not seen on CT(did not cover that area) but may have been there on MRI from December 2021.      F/U CT 8/15/23 - Left lower lobe solid nodule measures 13 x 12 mm, previously 13 x 9 mm.  Stable pulmonary micro nodules bilaterally.  No new or enlarging nodules.    Upper Abdomen: Stable appearance of subcentimeter hepatic and splenic hypodensities, too small to characterize but favored to represent cysts.     Bones: Similar size and appearance of sclerotic lesion within the sternum, measuring 1.5 cm.  Stable T4 sclerotic focus, likely bone island.    CT chest 2/28/24 -   Lungs/Pleura: Dense bands of atelectasis versus scarring in the bilateral lung bases.  Left lower lobe solid pulmonary nodule measuring 1.4 x 1.7 cm (4-357), previously measuring 1.3 x 1.2 cm.  New nodular opacity along the right lower lobe posterior pleura (4-305), likely related to atelectasis.  No new suspicious pulmonary nodules.  No pleural effusion or pneumothorax.       She had a slowly enlarging LLL nodule. Needle BX 4/9/24 - lung adenocarcinoma.   On 5/9/24 she had LLL lobectomy. Path showed a 1.5 cm moderately differentiated adenocarcinoma with negative hilar and mediastinal nodes.    Negative CT chest in November 2024.     Review of Systems   Constitutional:  Negative for activity change, appetite change, fever and unexpected weight change.   HENT:  Positive for hearing loss. Negative for mouth sores.    Eyes:  Negative for visual disturbance.   Respiratory:  Negative for cough and shortness of breath.    Cardiovascular:  Negative for chest pain (Occasional left chest soreness).    Gastrointestinal:  Negative for abdominal pain and diarrhea.   Genitourinary: Negative.  Negative for frequency.   Musculoskeletal:  Negative for back pain.   Integumentary:  Negative for rash.   Neurological:  Negative for headaches.   Hematological:  Negative for adenopathy.   Psychiatric/Behavioral:  The patient is not nervous/anxious.          Objective:      Physical Exam  Vitals reviewed.   Constitutional:       General: She is not in acute distress.  Eyes:      General: No scleral icterus.  Cardiovascular:      Rate and Rhythm: Normal rate and regular rhythm.   Pulmonary:      Effort: Pulmonary effort is normal. No respiratory distress.      Breath sounds: No wheezing or rales.      Comments: Some decreased BS left base  Chest:       Abdominal:      Palpations: Abdomen is soft. There is no mass.      Tenderness: There is no abdominal tenderness.   Lymphadenopathy:      Cervical: No cervical adenopathy.      Upper Body:      Right upper body: No supraclavicular or axillary adenopathy.      Left upper body: No supraclavicular or axillary adenopathy.   Neurological:      Mental Status: She is alert and oriented to person, place, and time.   Psychiatric:         Mood and Affect: Mood normal.         Behavior: Behavior normal.         Thought Content: Thought content normal.         Judgment: Judgment normal.       Assessment:     DEXA 2/20/25 - osteopenia (previously normal)  Problem List Items Addressed This Visit       Malignant neoplasm of upper-outer quadrant of left breast in female, estrogen receptor positive - Primary       Plan:     Continue letrozole.  I discussed Boniva therapy  RTC 6 M          Route Chart for Scheduling    Med Onc Chart Routing      Follow up with physician 6 months.   Follow up with [facility]    Infusion scheduling note    Injection scheduling note    Labs None   Scheduling:  Preferred lab:  Lab interval:     Imaging None      Pharmacy appointment No pharmacy appointment needed       Other referrals no referral to Oncology Primary Care needed -  no Massage appointment needed    No additional referrals needed

## 2025-03-26 DIAGNOSIS — C50.412 MALIGNANT NEOPLASM OF UPPER-OUTER QUADRANT OF LEFT BREAST IN FEMALE, ESTROGEN RECEPTOR POSITIVE: ICD-10-CM

## 2025-03-26 DIAGNOSIS — Z17.0 MALIGNANT NEOPLASM OF UPPER-OUTER QUADRANT OF LEFT BREAST IN FEMALE, ESTROGEN RECEPTOR POSITIVE: ICD-10-CM

## 2025-03-27 RX ORDER — LETROZOLE 2.5 MG/1
2.5 TABLET, FILM COATED ORAL
Qty: 90 TABLET | Refills: 3 | Status: SHIPPED | OUTPATIENT
Start: 2025-03-27

## 2025-04-05 ENCOUNTER — OFFICE VISIT (OUTPATIENT)
Dept: URGENT CARE | Facility: CLINIC | Age: 70
End: 2025-04-05
Payer: MEDICARE

## 2025-04-05 VITALS
RESPIRATION RATE: 16 BRPM | BODY MASS INDEX: 28.32 KG/M2 | TEMPERATURE: 98 F | WEIGHT: 170 LBS | OXYGEN SATURATION: 98 % | HEART RATE: 77 BPM | DIASTOLIC BLOOD PRESSURE: 81 MMHG | HEIGHT: 65 IN | SYSTOLIC BLOOD PRESSURE: 142 MMHG

## 2025-04-05 DIAGNOSIS — H11.31 SUBCONJUNCTIVAL HEMORRHAGE, RIGHT: Primary | ICD-10-CM

## 2025-04-05 PROCEDURE — 99203 OFFICE O/P NEW LOW 30 MIN: CPT | Mod: S$GLB,,, | Performed by: NURSE PRACTITIONER

## 2025-04-05 NOTE — PROGRESS NOTES
"Subjective:      Patient ID: Cara Sharp is a 69 y.o. female.    Vitals:  height is 5' 5" (1.651 m) and weight is 77.1 kg (170 lb). Her oral temperature is 98.4 °F (36.9 °C). Her blood pressure is 142/81 (abnormal) and her pulse is 77. Her respiration is 16 and oxygen saturation is 98%.     Chief Complaint: Eye Problem (Right eye)    Patient reports that right eye started last night.Patient used OTC eye drops .    Provider note begins below    Patient reports missing some redness behind her right eye.  Denies any pain or loss of vision.  Feels the pressure in her eye.  Tried some allergy drops.    Eye Problem   The right eye is affected. This is a new problem. The current episode started yesterday. The problem occurs constantly. The problem has been gradually worsening. There was no injury mechanism. The pain is at a severity of 4/10. There is No known exposure to pink eye. She Does not wear contacts. Associated symptoms include eye redness, a foreign body sensation and itching. Pertinent negatives include no blurred vision, eye discharge, double vision or photophobia. She has tried commercial eye wash and eye drops for the symptoms. The treatment provided no relief.       HENT:          Right Eye lid feels heavy   Eyes:  Positive for eye itching and eye redness. Negative for eye trauma, foreign body in eye, eye discharge, eye pain, photophobia, vision loss, double vision, blurred vision and eyelid swelling.        Watery right eye      Objective:     Physical Exam   Constitutional: She is oriented to person, place, and time.   HENT:   Head: Normocephalic and atraumatic.   Eyes: Right eye exhibits no discharge and no hordeolum. No foreign body present in the right eye. Right conjunctiva is injected.       Cardiovascular: Normal rate.   Pulmonary/Chest: Effort normal. No respiratory distress.   Abdominal: Normal appearance.   Neurological: She is alert and oriented to person, place, and time.   Skin: Skin is warm " and dry.   Psychiatric: Her behavior is normal. Mood normal.       Assessment:     1. Subconjunctival hemorrhage, right        Plan:   Lubricating drops like Systane  Cold compresses   Follow up if not improving  ED precautions      Subconjunctival hemorrhage, right

## 2025-05-22 ENCOUNTER — HOSPITAL ENCOUNTER (OUTPATIENT)
Dept: RADIOLOGY | Facility: HOSPITAL | Age: 70
Discharge: HOME | End: 2025-05-22
Attending: INTERNAL MEDICINE
Payer: MEDICARE

## 2025-05-22 DIAGNOSIS — C34.92 NSCLC OF LEFT LUNG: ICD-10-CM

## 2025-05-22 PROCEDURE — 71250 CT THORAX DX C-: CPT | Mod: TC

## 2025-05-27 ENCOUNTER — OFFICE VISIT (OUTPATIENT)
Dept: PULMONOLOGY | Facility: CLINIC | Age: 70
End: 2025-05-27
Payer: MEDICARE

## 2025-05-27 VITALS
OXYGEN SATURATION: 95 % | HEART RATE: 80 BPM | WEIGHT: 169.06 LBS | DIASTOLIC BLOOD PRESSURE: 83 MMHG | BODY MASS INDEX: 28.17 KG/M2 | SYSTOLIC BLOOD PRESSURE: 144 MMHG | HEIGHT: 65 IN

## 2025-05-27 DIAGNOSIS — C34.92 NSCLC OF LEFT LUNG: Primary | ICD-10-CM

## 2025-05-27 DIAGNOSIS — J90 PLEURAL EFFUSION: ICD-10-CM

## 2025-05-27 PROCEDURE — 3044F HG A1C LEVEL LT 7.0%: CPT | Mod: CPTII,S$GLB,, | Performed by: INTERNAL MEDICINE

## 2025-05-27 PROCEDURE — 3008F BODY MASS INDEX DOCD: CPT | Mod: CPTII,S$GLB,, | Performed by: INTERNAL MEDICINE

## 2025-05-27 PROCEDURE — 1126F AMNT PAIN NOTED NONE PRSNT: CPT | Mod: CPTII,S$GLB,, | Performed by: INTERNAL MEDICINE

## 2025-05-27 PROCEDURE — 99999 PR PBB SHADOW E&M-EST. PATIENT-LVL III: CPT | Mod: PBBFAC,,, | Performed by: INTERNAL MEDICINE

## 2025-05-27 PROCEDURE — G2211 COMPLEX E/M VISIT ADD ON: HCPCS | Mod: S$GLB,,, | Performed by: INTERNAL MEDICINE

## 2025-05-27 PROCEDURE — 3288F FALL RISK ASSESSMENT DOCD: CPT | Mod: CPTII,S$GLB,, | Performed by: INTERNAL MEDICINE

## 2025-05-27 PROCEDURE — 3077F SYST BP >= 140 MM HG: CPT | Mod: CPTII,S$GLB,, | Performed by: INTERNAL MEDICINE

## 2025-05-27 PROCEDURE — 1159F MED LIST DOCD IN RCRD: CPT | Mod: CPTII,S$GLB,, | Performed by: INTERNAL MEDICINE

## 2025-05-27 PROCEDURE — 1101F PT FALLS ASSESS-DOCD LE1/YR: CPT | Mod: CPTII,S$GLB,, | Performed by: INTERNAL MEDICINE

## 2025-05-27 PROCEDURE — 99214 OFFICE O/P EST MOD 30 MIN: CPT | Mod: S$GLB,,, | Performed by: INTERNAL MEDICINE

## 2025-05-27 PROCEDURE — 3079F DIAST BP 80-89 MM HG: CPT | Mod: CPTII,S$GLB,, | Performed by: INTERNAL MEDICINE

## 2025-05-27 NOTE — PROGRESS NOTES
Subjective:       Patient ID: Cara Sharp is a 69 y.o. female.  The patient's last visit with me was on 11/11/2024.     No new complaints. No GILLIS. No cough. No pain.    Follow-up  History of Present Illness             Review of Systems    Objective:      Vitals:    05/27/25 1334   BP: (!) 144/83   Pulse: 80     Wt Readings from Last 3 Encounters:   05/27/25 76.7 kg (169 lb 1.5 oz)   04/05/25 77.1 kg (170 lb)   02/24/25 77.4 kg (170 lb 10.2 oz)     Temp Readings from Last 3 Encounters:   04/05/25 98.4 °F (36.9 °C) (Oral)   02/24/25 98.2 °F (36.8 °C) (Oral)   10/24/24 97.9 °F (36.6 °C) (Oral)     BP Readings from Last 3 Encounters:   05/27/25 (!) 144/83   04/05/25 (!) 142/81   02/24/25 (!) 165/72     Pulse Readings from Last 3 Encounters:   05/27/25 80   04/05/25 77   02/24/25 85       Physical Exam   Constitutional: She appears well-developed and well-nourished.   Pulmonary/Chest: Effort normal and breath sounds normal.   Vitals reviewed.    CBC  Lab Results   Component Value Date    WBC 11.77 05/09/2024    HGB 13.2 05/09/2024    HCT 38.5 05/09/2024    MCV 93 05/09/2024     05/09/2024         CMP  Sodium   Date Value Ref Range Status   02/18/2025 141 135 - 146 mmol/L Final   05/01/2024 141 136 - 145 mmol/L Final     Potassium   Date Value Ref Range Status   02/18/2025 4.1 3.5 - 5.3 mmol/L Final   05/01/2024 4.1 3.5 - 5.1 mmol/L Final     Chloride   Date Value Ref Range Status   05/01/2024 106 95 - 110 mmol/L Final     CO2   Date Value Ref Range Status   05/01/2024 28 23 - 29 mmol/L Final     Carbon Dioxide   Date Value Ref Range Status   02/18/2025 30 20 - 32 mmol/L Final     Glucose   Date Value Ref Range Status   02/18/2025 98 65 - 99 mg/dL Final     Comment:                  Fasting reference interval   05/01/2024 107 70 - 110 mg/dL Final     BUN   Date Value Ref Range Status   06/20/2024 12.0 7.0 - 25.0 mg/dL Final   05/01/2024 15 8 - 23 mg/dL Final     Blood Urea Nitrogen   Date Value Ref Range Status  "  02/18/2025 15 7 - 25 mg/dL Final     Creatinine   Date Value Ref Range Status   02/18/2025 0.68 0.50 - 1.05 mg/dL Final   05/09/2024 0.7 0.5 - 1.4 mg/dL Final     Calcium   Date Value Ref Range Status   02/18/2025 9.5 8.6 - 10.4 mg/dL Final   05/01/2024 9.6 8.7 - 10.5 mg/dL Final     Total Protein   Date Value Ref Range Status   05/01/2024 7.6 6.0 - 8.4 g/dL Final     Albumin   Date Value Ref Range Status   05/01/2024 4.1 3.5 - 5.2 g/dL Final     Albumin Level   Date Value Ref Range Status   02/18/2025 4.6 3.6 - 5.1 g/dL Final     Total Bilirubin   Date Value Ref Range Status   02/18/2025 1.0 0.2 - 1.2 mg/dL Final   05/01/2024 0.6 0.1 - 1.0 mg/dL Final     Comment:     For infants and newborns, interpretation of results should be based  on gestational age, weight and in agreement with clinical  observations.    Premature Infant recommended reference ranges:  Up to 24 hours.............<8.0 mg/dL  Up to 48 hours............<12.0 mg/dL  3-5 days..................<15.0 mg/dL  6-29 days.................<15.0 mg/dL       Alkaline Phosphatase   Date Value Ref Range Status   02/18/2025 98 37 - 153 U/L Final   05/01/2024 89 55 - 135 U/L Final     AST   Date Value Ref Range Status   02/18/2025 21 10 - 35 U/L Final   05/01/2024 27 10 - 40 U/L Final     ALT   Date Value Ref Range Status   02/18/2025 26 6 - 29 U/L Final   06/20/2024 24 <46 U/L Final   05/01/2024 41 10 - 44 U/L Final     Anion Gap   Date Value Ref Range Status   06/20/2024 10 8 - 16 Final     Comment:     Calculation does not include K+   05/01/2024 7 (L) 8 - 16 mmol/L Final     eGFR   Date Value Ref Range Status   02/18/2025 95 > OR = 60 mL/min/1.73m2 Final   06/20/2024 98 >=90 mL/min/1.73m2 Final     Comment:     Calculation based on the Chronic Kidney Disease Epidemiology Collaboration (CKD-EPI) equation refit without adjustment for race.   05/09/2024 >60.0 >60 mL/min/1.73 m^2 Final       ABG  No results found for: "PH", "PO2", "PCO2"        Personal " "Diagnostic Review  I have personally reviewed the following data and added my own interpretation as below:  CT Chest 5/22/25 images personally reviewed and shows s/p LLLectomy, no pleural effusions, no LAD or nodules. Awaiting radiology read      5/27/2025     1:34 PM 4/5/2025    11:58 AM 2/24/2025     8:32 AM 11/11/2024     3:26 PM 10/24/2024     9:35 AM 6/27/2024     9:48 AM 6/20/2024     3:22 PM   Pulmonary Function Tests   SpO2 95 % 98 % 95 % 95 % 97 % 99 % 93 %   Height 5' 5" (1.651 m) 5' 5" (1.651 m) 5' 5" (1.651 m)  5' 5" (1.651 m) 5' 5" (1.651 m) 5' 5" (1.651 m)   Weight 76.7 kg (169 lb 1.5 oz) 77.1 kg (170 lb) 77.4 kg (170 lb 10.2 oz) 77.3 kg (170 lb 6.7 oz) 76.9 kg (169 lb 8.5 oz) 78.9 kg (173 lb 15.1 oz) 79.5 kg (175 lb 4.3 oz)   BMI (Calculated) 28.1 28.3 28.4  28.2 28.9 29.2         Assessment:       1. NSCLC of left lung    2. Pleural effusion        Encounter Medications[1]  1. NSCLC of left lung  - CT Chest Without Contrast; Future    2. Pleural effusion    Plan:     Problem List Items Addressed This Visit          Pulmonary    Pleural effusion    Current Assessment & Plan   Likely 2/2 post procedure inflammation. If recurrs will repeat thoracentesis in office and start course of anti-inflammatories. No significant effusion on today's exam or recent CT            Oncology    NSCLC of left lung - Primary    Current Assessment & Plan   Significant chronic condition to be followed longitudinally with following long term treatment plan.     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 PDL-1 low level activity.  Unclear benefit of adjuvant therapy in Stage 1B and she has no high risk features.   Discussed risks of adjuvant therapy outweigh benefits at this time.    CT Chest repeat q6 months for 1st 2 years, then yearly until 5 years out.  -no active disease 1 year out         Relevant Orders    CT Chest Without Contrast       Assessment & Plan               Please note Overview Notes are historic " documentation. Please review A/P for current updates.  No follow-ups on file.    No future appointments.          Jonathan Baca MD             [1]  Outpatient Encounter Medications as of 5/27/2025   Medication Sig Dispense Refill    atorvastatin (LIPITOR) 40 MG tablet Take 1 tablet by mouth every evening.      cyanocobalamin (VITAMIN B-12) 500 MCG tablet Take 500 mcg by mouth once daily.      ergocalciferol (ERGOCALCIFEROL) 50,000 unit Cap Take by mouth every 7 days. WEDNESDAYS      ibandronate (BONIVA) 150 mg tablet Take 1 tablet (150 mg total) by mouth every 30 days. 4 tablet 3    letrozole (FEMARA) 2.5 mg Tab TAKE 1 TABLET EVERY DAY 90 tablet 3    levothyroxine (SYNTHROID) 88 MCG tablet Take by mouth before breakfast.      paroxetine (PAXIL) 40 MG tablet Take 50 mg by mouth every morning.       Facility-Administered Encounter Medications as of 5/27/2025   Medication Dose Route Frequency Provider Last Rate Last Admin    0.9%  NaCl infusion   Intravenous Continuous Alongi, Shelbie MARINO MD 70 mL/hr at 12/03/21 0630 New Bag at 12/03/21 0630    celecoxib capsule 400 mg  400 mg Oral Once Yonas Werner MD        fentaNYL 50 mcg/mL injection  mcg   mcg Intravenous PRN Yonas Werner MD   100 mcg at 12/03/21 0650    LIDOcaine (PF) 10 mg/ml (1%) injection 10 mg  1 mL Intradermal Once Yonas Werner MD        midazolam (VERSED) 1 mg/mL injection 0.5-4 mg  0.5-4 mg Intravenous PRN Yonas Werner MD   2 mg at 12/03/21 0650

## 2025-05-27 NOTE — ASSESSMENT & PLAN NOTE
Significant chronic condition to be followed longitudinally with following long term treatment plan.     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 PDL-1 low level activity.  Unclear benefit of adjuvant therapy in Stage 1B and she has no high risk features.   Discussed risks of adjuvant therapy outweigh benefits at this time.    CT Chest repeat q6 months for 1st 2 years, then yearly until 5 years out.  -no active disease 1 year out

## 2025-05-27 NOTE — ASSESSMENT & PLAN NOTE
Likely 2/2 post procedure inflammation. If recurrs will repeat thoracentesis in office and start course of anti-inflammatories. No significant effusion on today's exam or recent CT

## 2025-07-17 ENCOUNTER — TELEPHONE (OUTPATIENT)
Dept: HEMATOLOGY/ONCOLOGY | Facility: CLINIC | Age: 70
End: 2025-07-17
Payer: MEDICARE

## 2025-07-17 NOTE — TELEPHONE ENCOUNTER
Copied from CRM #[id number]. Topic: Appointments - Appointment Rescheduling  >> Jul 17, 2025 11:11 AM Angelia wrote:          Scheduling Request           Appt Type:  6 month F/U     Date/Time Preference:08/25-59/2025 around mid morning/ afternoon      Treating Provider:Vikas     Caller Name:Cara MILADY Aron       Contact Preference:[phone] (home)       Comments/notes: Would like to be seen at [facility]

## 2025-07-17 NOTE — TELEPHONE ENCOUNTER
Patient contacted to schedule appointment with provider Dr. Vikas MD. Appointment has been scheduled , appointment details confirmed , appointment reminder printed and mailed. No further questions or concerns noted during call.

## 2025-09-02 ENCOUNTER — HOSPITAL ENCOUNTER (OUTPATIENT)
Dept: RADIOLOGY | Facility: HOSPITAL | Age: 70
Discharge: HOME | End: 2025-09-02
Attending: INTERNAL MEDICINE
Payer: MEDICARE

## 2025-09-02 ENCOUNTER — OFFICE VISIT (OUTPATIENT)
Dept: HEMATOLOGY/ONCOLOGY | Facility: CLINIC | Age: 70
End: 2025-09-02
Payer: MEDICARE

## 2025-09-02 VITALS
HEART RATE: 78 BPM | HEIGHT: 65 IN | SYSTOLIC BLOOD PRESSURE: 163 MMHG | RESPIRATION RATE: 18 BRPM | DIASTOLIC BLOOD PRESSURE: 77 MMHG | BODY MASS INDEX: 28.36 KG/M2 | WEIGHT: 170.19 LBS | TEMPERATURE: 98 F | OXYGEN SATURATION: 95 %

## 2025-09-02 DIAGNOSIS — M54.6 PAIN IN THORACIC SPINE: ICD-10-CM

## 2025-09-02 DIAGNOSIS — Z17.0 MALIGNANT NEOPLASM OF UPPER-OUTER QUADRANT OF LEFT BREAST IN FEMALE, ESTROGEN RECEPTOR POSITIVE: Primary | ICD-10-CM

## 2025-09-02 DIAGNOSIS — C50.412 MALIGNANT NEOPLASM OF UPPER-OUTER QUADRANT OF LEFT BREAST IN FEMALE, ESTROGEN RECEPTOR POSITIVE: Primary | ICD-10-CM

## 2025-09-02 DIAGNOSIS — M25.551 RIGHT HIP PAIN: ICD-10-CM

## 2025-09-02 DIAGNOSIS — M54.9 DORSALGIA, UNSPECIFIED: ICD-10-CM

## 2025-09-02 PROCEDURE — 73502 X-RAY EXAM HIP UNI 2-3 VIEWS: CPT | Mod: 26,RT,, | Performed by: RADIOLOGY

## 2025-09-02 PROCEDURE — 99214 OFFICE O/P EST MOD 30 MIN: CPT | Mod: PBBFAC | Performed by: INTERNAL MEDICINE

## 2025-09-02 PROCEDURE — 73502 X-RAY EXAM HIP UNI 2-3 VIEWS: CPT | Mod: TC,PN,RT

## (undated) DEVICE — PORT AIRSEAL 12/120MM LPI

## (undated) DEVICE — ELECTRODE BLADE INSULATED 1 IN

## (undated) DEVICE — DRESSING TRANS 4X4 TEGADERM

## (undated) DEVICE — STAPLER SUREFORM CRVD TIP 45

## (undated) DEVICE — TRAY MINOR GEN SURG OMC

## (undated) DEVICE — RELOAD SUREFORM 45 3.5 BLU 6R

## (undated) DEVICE — DRAPE SCOPE PILLOW WARMER

## (undated) DEVICE — SUT VICRYL 3-0 27 SH

## (undated) DEVICE — NDL HYPO REG 25G X 1 1/2

## (undated) DEVICE — GOWN SMARTGOWN LVL4 X-LONG XL

## (undated) DEVICE — ELECTRODE REM PLYHSV RETURN 9

## (undated) DEVICE — SHEATH GUIDE SCOUT SURG RADAR

## (undated) DEVICE — SYR 10CC LUER LOCK

## (undated) DEVICE — MARGIN MARKER STANDARD 6 COLOR

## (undated) DEVICE — DRAPE ABDOMINAL TIBURON 14X11

## (undated) DEVICE — MARKER SKIN STND TIP BLUE BARR

## (undated) DEVICE — ADHESIVE DERMABOND ADVANCED

## (undated) DEVICE — SUT SILK 0 STRANDS 30IN BLK

## (undated) DEVICE — TUBING SUC UNIV W/CONN 12FT

## (undated) DEVICE — DRAPE INCISE IOBAN 2 23X17IN

## (undated) DEVICE — DRAPE COLUMN DAVINCI XI

## (undated) DEVICE — SEE MEDLINE ITEM 157131

## (undated) DEVICE — KIT ANTIFOG W/SPONG & FLUID

## (undated) DEVICE — SOL WATER STRL IRR 1000ML

## (undated) DEVICE — UNDERGLOVE BIOGEL PI SZ 6.5 LF

## (undated) DEVICE — CANNULA REDUCER 12-8MM

## (undated) DEVICE — SYR ONLY LUER LOCK 20CC

## (undated) DEVICE — DRAPE ARM DAVINCI XI

## (undated) DEVICE — DRAPE STERI INSTRUMENT 1018

## (undated) DEVICE — GLOVE BIOGEL SKINSENSE PI 7.5

## (undated) DEVICE — CONTAINER SPECIMEN OR STER 4OZ

## (undated) DEVICE — SUT MCRYL PLUS 4-0 PS2 27IN

## (undated) DEVICE — SUT 0 VICRYL / CT-1

## (undated) DEVICE — YANKAUER OPEN TIP W/O VENT

## (undated) DEVICE — APPLICATOR CHLORAPREP ORN 26ML

## (undated) DEVICE — SUT 2-0 SILK 30IN BLK BRAID

## (undated) DEVICE — DRAIN CHEST DRY SUCTION

## (undated) DEVICE — SUT 2/0 30IN SILK BLK BRAI

## (undated) DEVICE — GOWN SMART IMP BREATHABLE XXLG

## (undated) DEVICE — CANNULA SEAL 12MM

## (undated) DEVICE — BRA POST SURGICAL WHT 40-42IN

## (undated) DEVICE — COVER PROBE SHEATH SURG 6X3IN

## (undated) DEVICE — COVER LIGHT HANDLE

## (undated) DEVICE — SUT VICRYL CTD 2-0 GI 27 SH

## (undated) DEVICE — SET TRI-LUMEN FILTERED TUBE

## (undated) DEVICE — TRAY MINOR GEN SURG

## (undated) DEVICE — SYR SLIP TIP 20CC

## (undated) DEVICE — Device

## (undated) DEVICE — SPONGE LAP 18X18 PREWASHED

## (undated) DEVICE — PACK UNIVERSAL SPLIT II

## (undated) DEVICE — SUT SILK 0 BLK BR FSL 18 IN

## (undated) DEVICE — SEAL UNIVERSAL 5MM-8MM XI

## (undated) DEVICE — CONTAINER SPECIMEN STRL 4OZ

## (undated) DEVICE — RELOAD SUREFORM 45 2.5 WHT 6R

## (undated) DEVICE — NDL SPINAL 18GX3.5 SPINOCAN

## (undated) DEVICE — TAPE SILK 3IN

## (undated) DEVICE — DRESSING SURGICAL 1X3

## (undated) DEVICE — GLOVE BIOGEL SKINSENSE PI 6.5

## (undated) DEVICE — DRESSING TRANS 2X2 TEGADERM

## (undated) DEVICE — SPONGE SUPER KERLIX 6X6.75IN

## (undated) DEVICE — APPLIER CLIP LIAGCLIP 9.375IN

## (undated) DEVICE — TOWEL OR DISP STRL BLUE 4/PK

## (undated) DEVICE — DRAIN CHAN RND HUBLS 8MM 24FR

## (undated) DEVICE — SUT 2-0 VICRYL / SH (J417)

## (undated) DEVICE — SUT 2-0 VICRYL / CT-1